# Patient Record
Sex: MALE | HISPANIC OR LATINO | Employment: FULL TIME | URBAN - METROPOLITAN AREA
[De-identification: names, ages, dates, MRNs, and addresses within clinical notes are randomized per-mention and may not be internally consistent; named-entity substitution may affect disease eponyms.]

---

## 2018-09-29 ENCOUNTER — APPOINTMENT (OUTPATIENT)
Dept: RADIOLOGY | Facility: MEDICAL CENTER | Age: 63
DRG: 854 | End: 2018-09-29
Attending: EMERGENCY MEDICINE
Payer: MEDICAID

## 2018-09-29 ENCOUNTER — HOSPITAL ENCOUNTER (INPATIENT)
Facility: MEDICAL CENTER | Age: 63
LOS: 17 days | DRG: 854 | End: 2018-10-16
Attending: EMERGENCY MEDICINE | Admitting: HOSPITALIST
Payer: MEDICAID

## 2018-09-29 DIAGNOSIS — L08.9 DIABETIC FOOT INFECTION (HCC): ICD-10-CM

## 2018-09-29 DIAGNOSIS — S98.132A AMPUTATED TOE OF LEFT FOOT (HCC): ICD-10-CM

## 2018-09-29 DIAGNOSIS — L03.90 CELLULITIS, UNSPECIFIED CELLULITIS SITE: ICD-10-CM

## 2018-09-29 DIAGNOSIS — E11.621 DIABETIC ULCER OF TOE OF LEFT FOOT ASSOCIATED WITH TYPE 2 DIABETES MELLITUS, WITH OTHER ULCER SEVERITY (HCC): ICD-10-CM

## 2018-09-29 DIAGNOSIS — E11.628 DIABETIC FOOT INFECTION (HCC): ICD-10-CM

## 2018-09-29 DIAGNOSIS — L97.528 DIABETIC ULCER OF TOE OF LEFT FOOT ASSOCIATED WITH TYPE 2 DIABETES MELLITUS, WITH OTHER ULCER SEVERITY (HCC): ICD-10-CM

## 2018-09-29 PROBLEM — E78.5 DYSLIPIDEMIA: Status: ACTIVE | Noted: 2018-09-29

## 2018-09-29 PROBLEM — I10 ESSENTIAL HYPERTENSION: Status: ACTIVE | Noted: 2018-09-29

## 2018-09-29 PROBLEM — A41.9 SEVERE SEPSIS (HCC): Status: RESOLVED | Noted: 2018-09-29 | Resolved: 2018-09-29

## 2018-09-29 PROBLEM — N17.9 AKI (ACUTE KIDNEY INJURY) (HCC): Status: ACTIVE | Noted: 2018-09-29

## 2018-09-29 PROBLEM — R65.20 SEVERE SEPSIS (HCC): Status: ACTIVE | Noted: 2018-09-29

## 2018-09-29 PROBLEM — R65.20 SEVERE SEPSIS (HCC): Status: RESOLVED | Noted: 2018-09-29 | Resolved: 2018-09-29

## 2018-09-29 PROBLEM — E11.8 TYPE 2 DIABETES MELLITUS WITH COMPLICATION, WITHOUT LONG-TERM CURRENT USE OF INSULIN (HCC): Status: ACTIVE | Noted: 2018-09-29

## 2018-09-29 PROBLEM — A41.9 SEVERE SEPSIS (HCC): Status: ACTIVE | Noted: 2018-09-29

## 2018-09-29 PROBLEM — A41.9 SEPSIS (HCC): Status: ACTIVE | Noted: 2018-09-29

## 2018-09-29 LAB
ALBUMIN SERPL BCP-MCNC: 3.6 G/DL (ref 3.2–4.9)
ALBUMIN/GLOB SERPL: 0.9 G/DL
ALP SERPL-CCNC: 74 U/L (ref 30–99)
ALT SERPL-CCNC: 10 U/L (ref 2–50)
ANION GAP SERPL CALC-SCNC: 12 MMOL/L (ref 0–11.9)
AST SERPL-CCNC: 11 U/L (ref 12–45)
BASOPHILS # BLD AUTO: 0.3 % (ref 0–1.8)
BASOPHILS # BLD: 0.07 K/UL (ref 0–0.12)
BILIRUB SERPL-MCNC: 1 MG/DL (ref 0.1–1.5)
BUN SERPL-MCNC: 42 MG/DL (ref 8–22)
CALCIUM SERPL-MCNC: 9.2 MG/DL (ref 8.5–10.5)
CHLORIDE SERPL-SCNC: 97 MMOL/L (ref 96–112)
CO2 SERPL-SCNC: 22 MMOL/L (ref 20–33)
CREAT SERPL-MCNC: 1.75 MG/DL (ref 0.5–1.4)
CRP SERPL HS-MCNC: 9.06 MG/DL (ref 0–0.75)
EOSINOPHIL # BLD AUTO: 0.11 K/UL (ref 0–0.51)
EOSINOPHIL NFR BLD: 0.5 % (ref 0–6.9)
ERYTHROCYTE [DISTWIDTH] IN BLOOD BY AUTOMATED COUNT: 39.8 FL (ref 35.9–50)
ERYTHROCYTE [SEDIMENTATION RATE] IN BLOOD BY WESTERGREN METHOD: 86 MM/HOUR (ref 0–20)
GLOBULIN SER CALC-MCNC: 4.2 G/DL (ref 1.9–3.5)
GLUCOSE BLD-MCNC: 229 MG/DL (ref 65–99)
GLUCOSE BLD-MCNC: 238 MG/DL (ref 65–99)
GLUCOSE SERPL-MCNC: 282 MG/DL (ref 65–99)
HCT VFR BLD AUTO: 43.1 % (ref 42–52)
HGB BLD-MCNC: 15.4 G/DL (ref 14–18)
IMM GRANULOCYTES # BLD AUTO: 0.13 K/UL (ref 0–0.11)
IMM GRANULOCYTES NFR BLD AUTO: 0.6 % (ref 0–0.9)
LACTATE BLD-SCNC: 1.6 MMOL/L (ref 0.5–2)
LYMPHOCYTES # BLD AUTO: 1.47 K/UL (ref 1–4.8)
LYMPHOCYTES NFR BLD: 6.8 % (ref 22–41)
MCH RBC QN AUTO: 31.3 PG (ref 27–33)
MCHC RBC AUTO-ENTMCNC: 35.7 G/DL (ref 33.7–35.3)
MCV RBC AUTO: 87.6 FL (ref 81.4–97.8)
MONOCYTES # BLD AUTO: 1.66 K/UL (ref 0–0.85)
MONOCYTES NFR BLD AUTO: 7.7 % (ref 0–13.4)
NEUTROPHILS # BLD AUTO: 18.25 K/UL (ref 1.82–7.42)
NEUTROPHILS NFR BLD: 84.1 % (ref 44–72)
NRBC # BLD AUTO: 0 K/UL
NRBC BLD-RTO: 0 /100 WBC
PLATELET # BLD AUTO: 248 K/UL (ref 164–446)
PMV BLD AUTO: 10.5 FL (ref 9–12.9)
POTASSIUM SERPL-SCNC: 3.7 MMOL/L (ref 3.6–5.5)
PROT SERPL-MCNC: 7.8 G/DL (ref 6–8.2)
RBC # BLD AUTO: 4.92 M/UL (ref 4.7–6.1)
SODIUM SERPL-SCNC: 131 MMOL/L (ref 135–145)
WBC # BLD AUTO: 21.7 K/UL (ref 4.8–10.8)

## 2018-09-29 PROCEDURE — 86140 C-REACTIVE PROTEIN: CPT

## 2018-09-29 PROCEDURE — 96367 TX/PROPH/DG ADDL SEQ IV INF: CPT

## 2018-09-29 PROCEDURE — 160035 HCHG PACU - 1ST 60 MINS PHASE I: Performed by: ORTHOPAEDIC SURGERY

## 2018-09-29 PROCEDURE — 501838 HCHG SUTURE GENERAL: Performed by: ORTHOPAEDIC SURGERY

## 2018-09-29 PROCEDURE — A6222 GAUZE <=16 IN NO W/SAL W/O B: HCPCS | Performed by: ORTHOPAEDIC SURGERY

## 2018-09-29 PROCEDURE — 700111 HCHG RX REV CODE 636 W/ 250 OVERRIDE (IP): Performed by: HOSPITALIST

## 2018-09-29 PROCEDURE — 88311 DECALCIFY TISSUE: CPT

## 2018-09-29 PROCEDURE — 87015 SPECIMEN INFECT AGNT CONCNTJ: CPT

## 2018-09-29 PROCEDURE — 500891 HCHG PACK, ORTHO MAJOR: Performed by: ORTHOPAEDIC SURGERY

## 2018-09-29 PROCEDURE — 87040 BLOOD CULTURE FOR BACTERIA: CPT

## 2018-09-29 PROCEDURE — 88304 TISSUE EXAM BY PATHOLOGIST: CPT

## 2018-09-29 PROCEDURE — 80053 COMPREHEN METABOLIC PANEL: CPT

## 2018-09-29 PROCEDURE — 96365 THER/PROPH/DIAG IV INF INIT: CPT

## 2018-09-29 PROCEDURE — 83605 ASSAY OF LACTIC ACID: CPT

## 2018-09-29 PROCEDURE — 160002 HCHG RECOVERY MINUTES (STAT): Performed by: ORTHOPAEDIC SURGERY

## 2018-09-29 PROCEDURE — 36415 COLL VENOUS BLD VENIPUNCTURE: CPT

## 2018-09-29 PROCEDURE — 700102 HCHG RX REV CODE 250 W/ 637 OVERRIDE(OP): Performed by: HOSPITALIST

## 2018-09-29 PROCEDURE — 85025 COMPLETE CBC W/AUTO DIFF WBC: CPT

## 2018-09-29 PROCEDURE — 87205 SMEAR GRAM STAIN: CPT

## 2018-09-29 PROCEDURE — 85652 RBC SED RATE AUTOMATED: CPT

## 2018-09-29 PROCEDURE — 73630 X-RAY EXAM OF FOOT: CPT | Mod: LT

## 2018-09-29 PROCEDURE — 87186 SC STD MICRODIL/AGAR DIL: CPT

## 2018-09-29 PROCEDURE — 87077 CULTURE AEROBIC IDENTIFY: CPT | Mod: 91

## 2018-09-29 PROCEDURE — 87206 SMEAR FLUORESCENT/ACID STAI: CPT

## 2018-09-29 PROCEDURE — 700105 HCHG RX REV CODE 258: Performed by: PHYSICIAN ASSISTANT

## 2018-09-29 PROCEDURE — 88305 TISSUE EXAM BY PATHOLOGIST: CPT

## 2018-09-29 PROCEDURE — 87076 CULTURE ANAEROBE IDENT EACH: CPT

## 2018-09-29 PROCEDURE — 87075 CULTR BACTERIA EXCEPT BLOOD: CPT

## 2018-09-29 PROCEDURE — 700105 HCHG RX REV CODE 258: Performed by: EMERGENCY MEDICINE

## 2018-09-29 PROCEDURE — 87102 FUNGUS ISOLATION CULTURE: CPT

## 2018-09-29 PROCEDURE — 0KBW0ZZ EXCISION OF LEFT FOOT MUSCLE, OPEN APPROACH: ICD-10-PCS | Performed by: ORTHOPAEDIC SURGERY

## 2018-09-29 PROCEDURE — 700111 HCHG RX REV CODE 636 W/ 250 OVERRIDE (IP): Performed by: EMERGENCY MEDICINE

## 2018-09-29 PROCEDURE — 82962 GLUCOSE BLOOD TEST: CPT

## 2018-09-29 PROCEDURE — 160027 HCHG SURGERY MINUTES - 1ST 30 MINS LEVEL 2: Performed by: ORTHOPAEDIC SURGERY

## 2018-09-29 PROCEDURE — 770006 HCHG ROOM/CARE - MED/SURG/GYN SEMI*

## 2018-09-29 PROCEDURE — 0Y6W0Z0 DETACHMENT AT LEFT 4TH TOE, COMPLETE, OPEN APPROACH: ICD-10-PCS | Performed by: ORTHOPAEDIC SURGERY

## 2018-09-29 PROCEDURE — A9270 NON-COVERED ITEM OR SERVICE: HCPCS | Performed by: HOSPITALIST

## 2018-09-29 PROCEDURE — 99285 EMERGENCY DEPT VISIT HI MDM: CPT

## 2018-09-29 PROCEDURE — 160009 HCHG ANES TIME/MIN: Performed by: ORTHOPAEDIC SURGERY

## 2018-09-29 PROCEDURE — 87070 CULTURE OTHR SPECIMN AEROBIC: CPT

## 2018-09-29 PROCEDURE — 700111 HCHG RX REV CODE 636 W/ 250 OVERRIDE (IP)

## 2018-09-29 PROCEDURE — 71045 X-RAY EXAM CHEST 1 VIEW: CPT

## 2018-09-29 PROCEDURE — 700101 HCHG RX REV CODE 250: Performed by: HOSPITALIST

## 2018-09-29 PROCEDURE — 160048 HCHG OR STATISTICAL LEVEL 1-5: Performed by: ORTHOPAEDIC SURGERY

## 2018-09-29 PROCEDURE — 87116 MYCOBACTERIA CULTURE: CPT

## 2018-09-29 PROCEDURE — 700111 HCHG RX REV CODE 636 W/ 250 OVERRIDE (IP): Performed by: PHYSICIAN ASSISTANT

## 2018-09-29 PROCEDURE — 99223 1ST HOSP IP/OBS HIGH 75: CPT | Performed by: HOSPITALIST

## 2018-09-29 RX ORDER — ACETAMINOPHEN 325 MG/1
650 TABLET ORAL EVERY 6 HOURS PRN
Status: DISCONTINUED | OUTPATIENT
Start: 2018-09-29 | End: 2018-10-16 | Stop reason: HOSPADM

## 2018-09-29 RX ORDER — MORPHINE SULFATE 4 MG/ML
1-4 INJECTION, SOLUTION INTRAMUSCULAR; INTRAVENOUS
Status: DISCONTINUED | OUTPATIENT
Start: 2018-09-29 | End: 2018-10-02

## 2018-09-29 RX ORDER — OXYCODONE HCL 5 MG/5 ML
5 SOLUTION, ORAL ORAL
Status: DISCONTINUED | OUTPATIENT
Start: 2018-09-29 | End: 2018-09-29 | Stop reason: HOSPADM

## 2018-09-29 RX ORDER — OXYCODONE HYDROCHLORIDE 5 MG/1
5-10 TABLET ORAL EVERY 4 HOURS PRN
Status: DISCONTINUED | OUTPATIENT
Start: 2018-09-29 | End: 2018-10-10

## 2018-09-29 RX ORDER — LOVASTATIN 40 MG/1
40 TABLET ORAL EVERY MORNING
COMMUNITY

## 2018-09-29 RX ORDER — LISINOPRIL AND HYDROCHLOROTHIAZIDE 20; 12.5 MG/1; MG/1
1 TABLET ORAL EVERY MORNING
COMMUNITY

## 2018-09-29 RX ORDER — PROMETHAZINE HYDROCHLORIDE 25 MG/1
12.5-25 SUPPOSITORY RECTAL EVERY 4 HOURS PRN
Status: DISCONTINUED | OUTPATIENT
Start: 2018-09-29 | End: 2018-10-16

## 2018-09-29 RX ORDER — DEXTROSE MONOHYDRATE 25 G/50ML
25 INJECTION, SOLUTION INTRAVENOUS
Status: DISCONTINUED | OUTPATIENT
Start: 2018-09-29 | End: 2018-10-01

## 2018-09-29 RX ORDER — PROMETHAZINE HYDROCHLORIDE 25 MG/1
12.5-25 TABLET ORAL EVERY 4 HOURS PRN
Status: DISCONTINUED | OUTPATIENT
Start: 2018-09-29 | End: 2018-10-16

## 2018-09-29 RX ORDER — ONDANSETRON 2 MG/ML
4 INJECTION INTRAMUSCULAR; INTRAVENOUS EVERY 4 HOURS PRN
Status: DISCONTINUED | OUTPATIENT
Start: 2018-09-29 | End: 2018-10-10

## 2018-09-29 RX ORDER — HALOPERIDOL 5 MG/ML
1 INJECTION INTRAMUSCULAR
Status: DISCONTINUED | OUTPATIENT
Start: 2018-09-29 | End: 2018-09-29 | Stop reason: HOSPADM

## 2018-09-29 RX ORDER — OXYCODONE HCL 5 MG/5 ML
10 SOLUTION, ORAL ORAL
Status: DISCONTINUED | OUTPATIENT
Start: 2018-09-29 | End: 2018-09-29 | Stop reason: HOSPADM

## 2018-09-29 RX ORDER — BISACODYL 10 MG
10 SUPPOSITORY, RECTAL RECTAL
Status: DISCONTINUED | OUTPATIENT
Start: 2018-09-29 | End: 2018-10-16

## 2018-09-29 RX ORDER — ONDANSETRON 4 MG/1
4 TABLET, ORALLY DISINTEGRATING ORAL EVERY 4 HOURS PRN
Status: DISCONTINUED | OUTPATIENT
Start: 2018-09-29 | End: 2018-10-16 | Stop reason: HOSPADM

## 2018-09-29 RX ORDER — MEPERIDINE HYDROCHLORIDE 25 MG/ML
12.5 INJECTION INTRAMUSCULAR; INTRAVENOUS; SUBCUTANEOUS
Status: DISCONTINUED | OUTPATIENT
Start: 2018-09-29 | End: 2018-09-29 | Stop reason: HOSPADM

## 2018-09-29 RX ORDER — POLYETHYLENE GLYCOL 3350 17 G/17G
1 POWDER, FOR SOLUTION ORAL
Status: DISCONTINUED | OUTPATIENT
Start: 2018-09-29 | End: 2018-10-16

## 2018-09-29 RX ORDER — DIPHENHYDRAMINE HYDROCHLORIDE 50 MG/ML
12.5 INJECTION INTRAMUSCULAR; INTRAVENOUS
Status: DISCONTINUED | OUTPATIENT
Start: 2018-09-29 | End: 2018-09-29 | Stop reason: HOSPADM

## 2018-09-29 RX ORDER — SODIUM CHLORIDE 9 MG/ML
1000 INJECTION, SOLUTION INTRAVENOUS
Status: COMPLETED | OUTPATIENT
Start: 2018-09-29 | End: 2018-09-29

## 2018-09-29 RX ORDER — SODIUM CHLORIDE, SODIUM LACTATE, POTASSIUM CHLORIDE, CALCIUM CHLORIDE 600; 310; 30; 20 MG/100ML; MG/100ML; MG/100ML; MG/100ML
INJECTION, SOLUTION INTRAVENOUS CONTINUOUS
Status: DISCONTINUED | OUTPATIENT
Start: 2018-09-29 | End: 2018-09-29 | Stop reason: HOSPADM

## 2018-09-29 RX ORDER — SODIUM CHLORIDE AND POTASSIUM CHLORIDE 150; 900 MG/100ML; MG/100ML
INJECTION, SOLUTION INTRAVENOUS CONTINUOUS
Status: DISCONTINUED | OUTPATIENT
Start: 2018-09-29 | End: 2018-10-01

## 2018-09-29 RX ORDER — HEPARIN SODIUM 5000 [USP'U]/ML
5000 INJECTION, SOLUTION INTRAVENOUS; SUBCUTANEOUS EVERY 8 HOURS
Status: DISCONTINUED | OUTPATIENT
Start: 2018-09-29 | End: 2018-10-16 | Stop reason: HOSPADM

## 2018-09-29 RX ORDER — AMOXICILLIN 250 MG
2 CAPSULE ORAL 2 TIMES DAILY
Status: DISCONTINUED | OUTPATIENT
Start: 2018-09-29 | End: 2018-10-16

## 2018-09-29 RX ORDER — ONDANSETRON 2 MG/ML
4 INJECTION INTRAMUSCULAR; INTRAVENOUS
Status: DISCONTINUED | OUTPATIENT
Start: 2018-09-29 | End: 2018-09-29 | Stop reason: HOSPADM

## 2018-09-29 RX ORDER — GLIMEPIRIDE 4 MG/1
4 TABLET ORAL EVERY MORNING
COMMUNITY

## 2018-09-29 RX ADMIN — VANCOMYCIN HYDROCHLORIDE 2000 MG: 100 INJECTION, POWDER, LYOPHILIZED, FOR SOLUTION INTRAVENOUS at 13:34

## 2018-09-29 RX ADMIN — SODIUM CHLORIDE 1000 ML: 9 INJECTION, SOLUTION INTRAVENOUS at 12:49

## 2018-09-29 RX ADMIN — POTASSIUM CHLORIDE AND SODIUM CHLORIDE: 900; 150 INJECTION, SOLUTION INTRAVENOUS at 18:58

## 2018-09-29 RX ADMIN — AMPICILLIN AND SULBACTAM 3 G: 2; 1 INJECTION, POWDER, FOR SOLUTION INTRAVENOUS at 23:19

## 2018-09-29 RX ADMIN — HEPARIN SODIUM 5000 UNITS: 5000 INJECTION, SOLUTION INTRAVENOUS; SUBCUTANEOUS at 23:19

## 2018-09-29 RX ADMIN — AMPICILLIN AND SULBACTAM 3 G: 2; 1 INJECTION, POWDER, FOR SOLUTION INTRAVENOUS at 18:55

## 2018-09-29 RX ADMIN — INSULIN HUMAN 4 UNITS: 100 INJECTION, SOLUTION PARENTERAL at 23:14

## 2018-09-29 RX ADMIN — AMPICILLIN AND SULBACTAM 3 G: 2; 1 INJECTION, POWDER, FOR SOLUTION INTRAVENOUS at 12:49

## 2018-09-29 ASSESSMENT — COPD QUESTIONNAIRES
DO YOU EVER COUGH UP ANY MUCUS OR PHLEGM?: NO/ONLY WITH OCCASIONAL COLDS OR INFECTIONS
COPD SCREENING SCORE: 2
IN THE PAST 12 MONTHS DO YOU DO LESS THAN YOU USED TO BECAUSE OF YOUR BREATHING PROBLEMS: DISAGREE/UNSURE
HAVE YOU SMOKED AT LEAST 100 CIGARETTES IN YOUR ENTIRE LIFE: NO/DON'T KNOW
DURING THE PAST 4 WEEKS HOW MUCH DID YOU FEEL SHORT OF BREATH: NONE/LITTLE OF THE TIME

## 2018-09-29 ASSESSMENT — PATIENT HEALTH QUESTIONNAIRE - PHQ9
2. FEELING DOWN, DEPRESSED, IRRITABLE, OR HOPELESS: NOT AT ALL
1. LITTLE INTEREST OR PLEASURE IN DOING THINGS: NOT AT ALL
SUM OF ALL RESPONSES TO PHQ9 QUESTIONS 1 AND 2: 0
1. LITTLE INTEREST OR PLEASURE IN DOING THINGS: NOT AT ALL
2. FEELING DOWN, DEPRESSED, IRRITABLE, OR HOPELESS: NOT AT ALL
SUM OF ALL RESPONSES TO PHQ9 QUESTIONS 1 AND 2: 0

## 2018-09-29 ASSESSMENT — LIFESTYLE VARIABLES
AVERAGE NUMBER OF DAYS PER WEEK YOU HAVE A DRINK CONTAINING ALCOHOL: 1
TOTAL SCORE: 0
ON A TYPICAL DAY WHEN YOU DRINK ALCOHOL HOW MANY DRINKS DO YOU HAVE: 2
HOW MANY TIMES IN THE PAST YEAR HAVE YOU HAD 5 OR MORE DRINKS IN A DAY: 0
EVER HAD A DRINK FIRST THING IN THE MORNING TO STEADY YOUR NERVES TO GET RID OF A HANGOVER: NO
TOTAL SCORE: 0
EVER FELT BAD OR GUILTY ABOUT YOUR DRINKING: NO
CONSUMPTION TOTAL: NEGATIVE
ALCOHOL_USE: YES
HAVE PEOPLE ANNOYED YOU BY CRITICIZING YOUR DRINKING: NO
HAVE YOU EVER FELT YOU SHOULD CUT DOWN ON YOUR DRINKING: NO
TOTAL SCORE: 0
EVER_SMOKED: NEVER

## 2018-09-29 ASSESSMENT — COGNITIVE AND FUNCTIONAL STATUS - GENERAL
MOBILITY SCORE: 24
SUGGESTED CMS G CODE MODIFIER DAILY ACTIVITY: CH
SUGGESTED CMS G CODE MODIFIER MOBILITY: CH
DAILY ACTIVITIY SCORE: 24

## 2018-09-29 ASSESSMENT — ENCOUNTER SYMPTOMS
SHORTNESS OF BREATH: 0
CHILLS: 0
FEVER: 0

## 2018-09-29 ASSESSMENT — PAIN SCALES - GENERAL
PAINLEVEL_OUTOF10: 0
PAINLEVEL_OUTOF10: 0
PAINLEVEL_OUTOF10: 3
PAINLEVEL_OUTOF10: 0
PAINLEVEL_OUTOF10: 0
PAINLEVEL_OUTOF10: 2
PAINLEVEL_OUTOF10: 0

## 2018-09-29 NOTE — CONSULTS
"9/29/2018    Sumit Estrada is a 62 y.o. male who presents after with a necrotic foot infection and is here for operative management. Patient denies numbness, parasthesias, loss of concousness or other trauma    Past Medical History:   Diagnosis Date   • Diabetes mellitus (HCC)    • HTN (hypertension)        History reviewed. No pertinent surgical history.    Medications  No current facility-administered medications on file prior to encounter.      No current outpatient prescriptions on file prior to encounter.       Allergies  Patient has no known allergies.    ROS  Left 4th toe nocrosis. All other systems were reviewed and found to be negative    History reviewed. No pertinent family history.    Social History     Social History   • Marital status: Single     Spouse name: N/A   • Number of children: N/A   • Years of education: N/A     Social History Main Topics   • Smoking status: Never Smoker   • Smokeless tobacco: Never Used   • Alcohol use Yes      Comment: occ   • Drug use: No   • Sexual activity: Not on file     Other Topics Concern   • Not on file     Social History Narrative   • No narrative on file       Physical Exam  Vitals  Blood pressure (!) 163/79, pulse (!) 103, temperature (!) 38.2 °C (100.8 °F), resp. rate 18, height 1.74 m (5' 8.5\"), weight 79.4 kg (175 lb), SpO2 96 %.  General: Well Developed, Well Nourished, no acute distress  HEENT: Normocephalic, atraumatic  Eyes: Anicteric, PERRLA, EOMI  Neck: Supple, nontender, no masses  Lungs: CTA, no wheezes or crackles  Heart: RRR, no murmurs, rubs or gallops  Abdomen: Soft, NT, ND  Pelvis: Stable to AP and Lateral Compression  Skin: Intact, no open wounds  Extremities: necrotic left 4th toe  Neuro: NVI  Vascular: 2+DP/PT, Capillary refill <2 seconds    Radiographs:  DX-FOOT-COMPLETE 3+ LEFT   Final Result      1.  No radiographic evidence for osteomyelitis      2.  Gas associated with the lateral forefoot      DX-CHEST-PORTABLE (1 VIEW)   Final " Result      Negative single view of the chest.          Laboratory Values  Recent Labs      09/29/18   1228   WBC  21.7*   RBC  4.92   HEMOGLOBIN  15.4   HEMATOCRIT  43.1   MCV  87.6   MCH  31.3   MCHC  35.7*   RDW  39.8   PLATELETCT  248   MPV  10.5     Recent Labs      09/29/18   1228   SODIUM  131*   POTASSIUM  3.7   CHLORIDE  97   CO2  22   GLUCOSE  282*   BUN  42*             Impression:Necrotic leftt 4th toe and foot absces    Plan:Operative intervention. Risks and benefits of surgery were discussed which include but are not limited to bleeding, infection, neurovascular damage, malunion, nonunion, instability, limb length discrepancy, DVT, PE, MI, Stroke and death. They understand these risks and wish to proceed.

## 2018-09-29 NOTE — ED NOTES
Med rec complete per pt and SO at bedside   Allergies reviewed - NKDA  No ABX in last month  Pt reports he has been out of lisinopril/hctz and glimepiride for around 2 weeks, but has still been taking his metformin and lovastatin

## 2018-09-29 NOTE — PROGRESS NOTES
Report received fromED RN. Pt arrived on unit by transport on RA.     Assessed patient left fourth toe. There are no signs of cap refill on toe. Toe has eschar on anterior side of toe. Will complete skin assessment.

## 2018-09-29 NOTE — ED TRIAGE NOTES
61 y/o male bib wheelchair for evaluation of a wound on the left foot. Pt was sent in by his pcp who believes the wound on the foot may be infected. Pt states that over one week ago he dropped a piece of furniture on the foot causing the wound.

## 2018-09-29 NOTE — ED PROVIDER NOTES
ED Provider Note    Scribed for Lavinia Cobb M.D. by Quinton Jessica. 9/29/2018, 12:18 PM.    Primary care provider: Pcp Pt States None  Means of arrival: Wheel chair  History obtained from: Patient  History limited by: None    CHIEF COMPLAINT  Chief Complaint   Patient presents with   • Wound Check       HPI  Sumit Estrada is a 62 y.o. male who presents to the Emergency Department for a wound check on the fourth toe.  This occurred after he dropped an object on the toe a week ago.  The patient [facility] today by Dr. Pierre.  The patient is positive for diabetes type 2, is not on insulin, and has diabetes this for 7 years. The patient denies any fever but had chills last night.    REVIEW OF SYSTEMS  Pertinent positives include left fourth toe pain, chills. Pertinent negatives include no fevers. All other systems reviewed and negative.     PAST MEDICAL HISTORY   has a past medical history of Diabetes mellitus (HCC) and HTN (hypertension).    SURGICAL HISTORY  patient denies any surgical history    SOCIAL HISTORY  Social History   Substance Use Topics   • Smoking status: Never Smoker   • Smokeless tobacco: Never Used   • Alcohol use Yes      Comment: occ      History   Drug Use No       FAMILY HISTORY  History reviewed. No pertinent family history.    CURRENT MEDICATIONS    Current Facility-Administered Medications:   •  ampicillin/sulbactam (UNASYN) 3 g in  mL IVPB, 3 g, Intravenous, Once, Lavinia Cobb M.D., Last Rate: 200 mL/hr at 09/29/18 1249, 3 g at 09/29/18 1249  •  vancomycin 2,000 mg in  mL IVPB, 25 mg/kg, Intravenous, Once, Lavinia Cobb M.D.    Current Outpatient Prescriptions:   •  metFORMIN (GLUCOPHAGE) 850 MG Tab, Take 850 mg by mouth 3 times a day., Disp: , Rfl:   •  lisinopril-hydrochlorothiazide (PRINZIDE, ZESTORETIC) 20-12.5 MG per tablet, Take 1 Tab by mouth every morning., Disp: , Rfl:   •  glimepiride (AMARYL) 4 MG Tab, Take 4  mg by mouth every morning., Disp: , Rfl:   •  lovastatin (MEVACOR) 40 MG tablet, Take 40 mg by mouth every morning., Disp: , Rfl:     ALLERGIES  No Known Allergies    PHYSICAL EXAM  VITAL SIGNS: /68   Pulse 91   Temp 36 °C (96.8 °F)   Resp 14   Wt 79.4 kg (175 lb)   SpO2 98%   BMI 26.22 kg/m²     Constitutional:  Laying in bed with his feet fully visible , able to answer questions  HENT: Nose is normal in appearance without rhinorrhea, external ears are normal,  moist mucous membranes  Eyes: Anicteric,  pupils are equal round and reactive, there is no conjunctival drainage or pallor   Neck: The trachea is midline, there is no obvious mass or meningeal signs  Cardiovascular: Equal radial pulsation, regular rate and rhythm without murmurs gallops or rubs  Thorax & Lungs: Respiratory rate and effort are normal. There is normal chest excursion with respiration.  No wheezes rhonchi or rales noted.  Abdomen: Abdomen is normal in appearance,  no pain with cough, nonpulsatile  :  No CVA tenderness to palpation  Musculoskeletal: Necrosis of the left fourth with ulceration and black circumferentially  on the surface and erythema and swelling of the entire left foot up to the ankle.  Skin: Visualized skin is warm, no erythema, no rash.  Neurologic:  Cranial nerves II through XII are intact there is no focal abnormality noted.  Psychiatric: Normal mood and mentation    DIAGNOSTIC STUDIES / PROCEDURES    LABS  DX-CHEST-PORTABLE (1 VIEW)    (Results Pending)     Results for orders placed or performed during the hospital encounter of 09/29/18   LACTIC ACID   Result Value Ref Range    Lactic Acid 1.6 0.5 - 2.0 mmol/L   CBC WITH DIFFERENTIAL   Result Value Ref Range    WBC 21.7 (H) 4.8 - 10.8 K/uL    RBC 4.92 4.70 - 6.10 M/uL    Hemoglobin 15.4 14.0 - 18.0 g/dL    Hematocrit 43.1 42.0 - 52.0 %    MCV 87.6 81.4 - 97.8 fL    MCH 31.3 27.0 - 33.0 pg    MCHC 35.7 (H) 33.7 - 35.3 g/dL    RDW 39.8 35.9 - 50.0 fL    Platelet  Count 248 164 - 446 K/uL    MPV 10.5 9.0 - 12.9 fL    Neutrophils-Polys 84.10 (H) 44.00 - 72.00 %    Lymphocytes 6.80 (L) 22.00 - 41.00 %    Monocytes 7.70 0.00 - 13.40 %    Eosinophils 0.50 0.00 - 6.90 %    Basophils 0.30 0.00 - 1.80 %    Immature Granulocytes 0.60 0.00 - 0.90 %    Nucleated RBC 0.00 /100 WBC    Neutrophils (Absolute) 18.25 (H) 1.82 - 7.42 K/uL    Lymphs (Absolute) 1.47 1.00 - 4.80 K/uL    Monos (Absolute) 1.66 (H) 0.00 - 0.85 K/uL    Eos (Absolute) 0.11 0.00 - 0.51 K/uL    Baso (Absolute) 0.07 0.00 - 0.12 K/uL    Immature Granulocytes (abs) 0.13 (H) 0.00 - 0.11 K/uL    NRBC (Absolute) 0.00 K/uL       All labs reviewed by me.    EKG    RADIOLOGY  DX-CHEST-PORTABLE (1 VIEW)    (Results Pending)     The radiologist's interpretation of all radiological studies have been reviewed by me.    COURSE & MEDICAL DECISION MAKING  Nursing notes and vital signs were reviewed. (See chart for details)  The patient's records were reviewed, history was obtained from the patient and from Dr. Pierre's student outreach clinic note that was carried with the patient;     The patient presents with left fourth toe infection, and the differential diagnosis includes but is not limited to cellulitis, diabetic foot ulceration, necrotic toe.       12:18 PM Initial orders in the Emergency Department included Chest Xray, Lactic acid, Westergren SED rate, CRP, CBC, CMP, UA, Urine culture, foot culture. Blood culture x2 and initial treatment in the Emergency Department included ampicillin/sulbactam 3g, Vancomycin and the patient received IV  Fluids for IV antibiotic use.      ED testing reveals elevated WBC, imaging pending      Additional work-up planned includes admission for IV antibitoics  For cellultiisand to rule out osteomyelitis, toe necrosis..  This was discussed with Dr. Mercado     FINAL IMPRESSION  1. Diabetic ulcer of toe of left foot associated with type 2 diabetes mellitus, with other ulcer severity (HCC)    2.  Cellulitis, unspecified cellulitis site          I, Quinton Jessica (Scribe), am scribing for, and in the presence of, Lavinia Cobb M.D..    Electronically signed by: Quinton Jessica (Scribe), 9/29/2018    ILavinia M.D. personally performed the services described in this documentation, as scribed by Quinton Jessica in my presence, and it is both accurate and complete. C.     The note accurately reflects work and decisions made by me.  Lavinia Cobb  9/29/2018  1:08 PM

## 2018-09-29 NOTE — PROGRESS NOTES
Pharmacy Kinetics 62 y.o. male on vancomycin day # 1  2018    Received Vancomycin 2,000 mg IV loading dose at 13:34 PM    Indication for Treatment: cellulitis of left toe / diabetic foot.    Pertinent history per medical record: Admitted on 2018 for sepsis / cellulitis of left toe / diabetic foot.    Sumit Estrada is a 62 y.o. diabetic male who presented to the ER for a wound check of his left fourth toe. The patient reportedly dropped an object on his toe a week ago and reports having chills last night. He presents with a leukocytosis, and an elevated c-reactive protein. Surgery has been consulted.     Other antibiotics: ampicllin-sulbactam 3g IV Q6h     Allergies: Patient has no known allergies.     List concerns for renal function: age, BUN/SCr ratio > 20:1    Pertinent cultures to date:   2018: Blood culture - in process  2018: Blood culture - in process  2018: Wound culture w/ gram stain - in process      Recent Labs      18   1228   WBC  21.7*   NEUTSPOLYS  84.10*     Recent Labs      18   1228   BUN  42*   CREATININE  1.75*   ALBUMIN  3.6      2018 12:28   Stat C-Reactive Protein 9.06 (H)     Blood pressure 118/68, pulse 94, temperature 36 °C (96.8 °F), resp. rate 14, weight 79.4 kg (175 lb), SpO2 94 %. Temp (24hrs), Av °C (96.8 °F), Min:36 °C (96.8 °F), Max:36 °C (96.8 °F)    A/P   1. Vancomycin dose change: No further doses ordered at this time due to aforementioned concerns for renal accumulation of vancomycin.   2. Next vancomycin level: Vancomycin random level ordered for 07:30 AM tomorrow (~18 hours post LD)  3. Goal trough: 12-16 mcg/mL  4. Comments: Concerns for accumulation of vancomycin listed above. No previous renal fcn labs to compare current labs to. Will obtain vancomycin random level ~18h post loading dose administration. Pharmacy will continue to follow and recommend de-escalation of antibiotics as appropriate.     Erin Diaz,  PharmD, BCPS

## 2018-09-30 PROBLEM — I10 ESSENTIAL HYPERTENSION: Chronic | Status: ACTIVE | Noted: 2018-09-29

## 2018-09-30 PROBLEM — E11.8 TYPE 2 DIABETES MELLITUS WITH COMPLICATION, WITHOUT LONG-TERM CURRENT USE OF INSULIN (HCC): Chronic | Status: ACTIVE | Noted: 2018-09-29

## 2018-09-30 PROBLEM — E78.5 DYSLIPIDEMIA: Chronic | Status: ACTIVE | Noted: 2018-09-29

## 2018-09-30 PROBLEM — S98.132A AMPUTATED TOE OF LEFT FOOT (HCC): Status: ACTIVE | Noted: 2018-09-30

## 2018-09-30 LAB
AMORPH CRY #/AREA URNS HPF: PRESENT /HPF
ANION GAP SERPL CALC-SCNC: 8 MMOL/L (ref 0–11.9)
APPEARANCE UR: CLEAR
BACTERIA #/AREA URNS HPF: NEGATIVE /HPF
BASOPHILS # BLD AUTO: 0.3 % (ref 0–1.8)
BASOPHILS # BLD AUTO: 0.4 % (ref 0–1.8)
BASOPHILS # BLD: 0.06 K/UL (ref 0–0.12)
BASOPHILS # BLD: 0.08 K/UL (ref 0–0.12)
BILIRUB UR QL STRIP.AUTO: NEGATIVE
BUN SERPL-MCNC: 28 MG/DL (ref 8–22)
CALCIUM SERPL-MCNC: 8.2 MG/DL (ref 8.5–10.5)
CHLORIDE SERPL-SCNC: 103 MMOL/L (ref 96–112)
CO2 SERPL-SCNC: 22 MMOL/L (ref 20–33)
COLOR UR: ABNORMAL
CREAT SERPL-MCNC: 1.02 MG/DL (ref 0.5–1.4)
EOSINOPHIL # BLD AUTO: 0.02 K/UL (ref 0–0.51)
EOSINOPHIL # BLD AUTO: 0.02 K/UL (ref 0–0.51)
EOSINOPHIL NFR BLD: 0.1 % (ref 0–6.9)
EOSINOPHIL NFR BLD: 0.1 % (ref 0–6.9)
EPI CELLS #/AREA URNS HPF: NEGATIVE /HPF
ERYTHROCYTE [DISTWIDTH] IN BLOOD BY AUTOMATED COUNT: 39.8 FL (ref 35.9–50)
ERYTHROCYTE [DISTWIDTH] IN BLOOD BY AUTOMATED COUNT: 40.4 FL (ref 35.9–50)
EST. AVERAGE GLUCOSE BLD GHB EST-MCNC: 192 MG/DL
GLUCOSE BLD-MCNC: 170 MG/DL (ref 65–99)
GLUCOSE BLD-MCNC: 170 MG/DL (ref 65–99)
GLUCOSE BLD-MCNC: 232 MG/DL (ref 65–99)
GLUCOSE BLD-MCNC: 82 MG/DL (ref 65–99)
GLUCOSE SERPL-MCNC: 186 MG/DL (ref 65–99)
GLUCOSE UR STRIP.AUTO-MCNC: >=1000 MG/DL
GRAM STN SPEC: NORMAL
GRAN CASTS #/AREA URNS LPF: ABNORMAL /LPF
HBA1C MFR BLD: 8.3 % (ref 0–5.6)
HCT VFR BLD AUTO: 36.3 % (ref 42–52)
HCT VFR BLD AUTO: 36.4 % (ref 42–52)
HGB BLD-MCNC: 12.8 G/DL (ref 14–18)
HGB BLD-MCNC: 12.9 G/DL (ref 14–18)
HYALINE CASTS #/AREA URNS LPF: ABNORMAL /LPF
IMM GRANULOCYTES # BLD AUTO: 0.1 K/UL (ref 0–0.11)
IMM GRANULOCYTES # BLD AUTO: 0.12 K/UL (ref 0–0.11)
IMM GRANULOCYTES NFR BLD AUTO: 0.5 % (ref 0–0.9)
IMM GRANULOCYTES NFR BLD AUTO: 0.7 % (ref 0–0.9)
KETONES UR STRIP.AUTO-MCNC: ABNORMAL MG/DL
LEUKOCYTE ESTERASE UR QL STRIP.AUTO: NEGATIVE
LYMPHOCYTES # BLD AUTO: 1.22 K/UL (ref 1–4.8)
LYMPHOCYTES # BLD AUTO: 1.24 K/UL (ref 1–4.8)
LYMPHOCYTES NFR BLD: 6.7 % (ref 22–41)
LYMPHOCYTES NFR BLD: 6.7 % (ref 22–41)
MAGNESIUM SERPL-MCNC: 1.8 MG/DL (ref 1.5–2.5)
MCH RBC QN AUTO: 31.1 PG (ref 27–33)
MCH RBC QN AUTO: 32 PG (ref 27–33)
MCHC RBC AUTO-ENTMCNC: 35.2 G/DL (ref 33.7–35.3)
MCHC RBC AUTO-ENTMCNC: 35.5 G/DL (ref 33.7–35.3)
MCV RBC AUTO: 88.3 FL (ref 81.4–97.8)
MCV RBC AUTO: 90.1 FL (ref 81.4–97.8)
MICRO URNS: ABNORMAL
MONOCYTES # BLD AUTO: 1.43 K/UL (ref 0–0.85)
MONOCYTES # BLD AUTO: 1.48 K/UL (ref 0–0.85)
MONOCYTES NFR BLD AUTO: 7.9 % (ref 0–13.4)
MONOCYTES NFR BLD AUTO: 8 % (ref 0–13.4)
NEUTROPHILS # BLD AUTO: 15.31 K/UL (ref 1.82–7.42)
NEUTROPHILS # BLD AUTO: 15.54 K/UL (ref 1.82–7.42)
NEUTROPHILS NFR BLD: 84.2 % (ref 44–72)
NEUTROPHILS NFR BLD: 84.4 % (ref 44–72)
NITRITE UR QL STRIP.AUTO: NEGATIVE
NRBC # BLD AUTO: 0 K/UL
NRBC # BLD AUTO: 0 K/UL
NRBC BLD-RTO: 0 /100 WBC
NRBC BLD-RTO: 0 /100 WBC
PH UR STRIP.AUTO: 5 [PH]
PLATELET # BLD AUTO: 226 K/UL (ref 164–446)
PLATELET # BLD AUTO: 233 K/UL (ref 164–446)
PMV BLD AUTO: 10.5 FL (ref 9–12.9)
PMV BLD AUTO: 11.5 FL (ref 9–12.9)
POTASSIUM SERPL-SCNC: 3.7 MMOL/L (ref 3.6–5.5)
PROT UR QL STRIP: 100 MG/DL
RBC # BLD AUTO: 4.03 M/UL (ref 4.7–6.1)
RBC # BLD AUTO: 4.12 M/UL (ref 4.7–6.1)
RBC # URNS HPF: ABNORMAL /HPF
RBC UR QL AUTO: NEGATIVE
RHODAMINE-AURAMINE STN SPEC: NORMAL
SIGNIFICANT IND 70042: NORMAL
SIGNIFICANT IND 70042: NORMAL
SITE SITE: NORMAL
SITE SITE: NORMAL
SODIUM SERPL-SCNC: 133 MMOL/L (ref 135–145)
SOURCE SOURCE: NORMAL
SOURCE SOURCE: NORMAL
SP GR UR STRIP.AUTO: 1.03
TRANS CELLS #/AREA URNS HPF: ABNORMAL /HPF
UROBILINOGEN UR STRIP.AUTO-MCNC: 1 MG/DL
VANCOMYCIN SERPL-MCNC: 15.7 UG/ML
WBC # BLD AUTO: 18.2 K/UL (ref 4.8–10.8)
WBC # BLD AUTO: 18.4 K/UL (ref 4.8–10.8)
WBC #/AREA URNS HPF: ABNORMAL /HPF

## 2018-09-30 PROCEDURE — 81001 URINALYSIS AUTO W/SCOPE: CPT

## 2018-09-30 PROCEDURE — 700105 HCHG RX REV CODE 258: Performed by: HOSPITALIST

## 2018-09-30 PROCEDURE — 700101 HCHG RX REV CODE 250: Performed by: HOSPITALIST

## 2018-09-30 PROCEDURE — 770006 HCHG ROOM/CARE - MED/SURG/GYN SEMI*

## 2018-09-30 PROCEDURE — 700105 HCHG RX REV CODE 258: Performed by: PHYSICIAN ASSISTANT

## 2018-09-30 PROCEDURE — 302242 IV POLE: Performed by: HOSPITALIST

## 2018-09-30 PROCEDURE — A9270 NON-COVERED ITEM OR SERVICE: HCPCS | Performed by: HOSPITALIST

## 2018-09-30 PROCEDURE — 80202 ASSAY OF VANCOMYCIN: CPT

## 2018-09-30 PROCEDURE — 700111 HCHG RX REV CODE 636 W/ 250 OVERRIDE (IP): Performed by: PHYSICIAN ASSISTANT

## 2018-09-30 PROCEDURE — 36415 COLL VENOUS BLD VENIPUNCTURE: CPT

## 2018-09-30 PROCEDURE — 83036 HEMOGLOBIN GLYCOSYLATED A1C: CPT

## 2018-09-30 PROCEDURE — 87086 URINE CULTURE/COLONY COUNT: CPT

## 2018-09-30 PROCEDURE — 80048 BASIC METABOLIC PNL TOTAL CA: CPT

## 2018-09-30 PROCEDURE — 700102 HCHG RX REV CODE 250 W/ 637 OVERRIDE(OP): Performed by: HOSPITALIST

## 2018-09-30 PROCEDURE — A9270 NON-COVERED ITEM OR SERVICE: HCPCS | Performed by: NURSE PRACTITIONER

## 2018-09-30 PROCEDURE — 99232 SBSQ HOSP IP/OBS MODERATE 35: CPT | Performed by: HOSPITALIST

## 2018-09-30 PROCEDURE — 85025 COMPLETE CBC W/AUTO DIFF WBC: CPT | Mod: 91

## 2018-09-30 PROCEDURE — 700111 HCHG RX REV CODE 636 W/ 250 OVERRIDE (IP): Performed by: HOSPITALIST

## 2018-09-30 PROCEDURE — 83735 ASSAY OF MAGNESIUM: CPT

## 2018-09-30 PROCEDURE — 82962 GLUCOSE BLOOD TEST: CPT | Mod: 91

## 2018-09-30 PROCEDURE — 700102 HCHG RX REV CODE 250 W/ 637 OVERRIDE(OP): Performed by: NURSE PRACTITIONER

## 2018-09-30 RX ORDER — LISINOPRIL AND HYDROCHLOROTHIAZIDE 20; 12.5 MG/1; MG/1
1 TABLET ORAL EVERY MORNING
Status: DISCONTINUED | OUTPATIENT
Start: 2018-09-30 | End: 2018-09-30

## 2018-09-30 RX ORDER — LISINOPRIL 20 MG/1
20 TABLET ORAL
Status: DISCONTINUED | OUTPATIENT
Start: 2018-09-30 | End: 2018-10-03

## 2018-09-30 RX ORDER — HYDROCHLOROTHIAZIDE 12.5 MG/1
12.5 TABLET ORAL
Status: DISCONTINUED | OUTPATIENT
Start: 2018-09-30 | End: 2018-10-10

## 2018-09-30 RX ORDER — LOVASTATIN 20 MG/1
40 TABLET ORAL EVERY MORNING
Status: DISCONTINUED | OUTPATIENT
Start: 2018-09-30 | End: 2018-10-16 | Stop reason: HOSPADM

## 2018-09-30 RX ADMIN — HEPARIN SODIUM 5000 UNITS: 5000 INJECTION, SOLUTION INTRAVENOUS; SUBCUTANEOUS at 06:04

## 2018-09-30 RX ADMIN — STANDARDIZED SENNA CONCENTRATE AND DOCUSATE SODIUM 2 TABLET: 8.6; 5 TABLET ORAL at 17:32

## 2018-09-30 RX ADMIN — POTASSIUM CHLORIDE AND SODIUM CHLORIDE: 900; 150 INJECTION, SOLUTION INTRAVENOUS at 21:05

## 2018-09-30 RX ADMIN — AMPICILLIN AND SULBACTAM 3 G: 2; 1 INJECTION, POWDER, FOR SOLUTION INTRAVENOUS at 06:04

## 2018-09-30 RX ADMIN — INSULIN HUMAN 10 UNITS: 100 INJECTION, SOLUTION PARENTERAL at 21:12

## 2018-09-30 RX ADMIN — INSULIN HUMAN 3 UNITS: 100 INJECTION, SOLUTION PARENTERAL at 11:52

## 2018-09-30 RX ADMIN — ACETAMINOPHEN 650 MG: 325 TABLET, FILM COATED ORAL at 14:38

## 2018-09-30 RX ADMIN — HEPARIN SODIUM 5000 UNITS: 5000 INJECTION, SOLUTION INTRAVENOUS; SUBCUTANEOUS at 13:57

## 2018-09-30 RX ADMIN — HYDROCHLOROTHIAZIDE 12.5 MG: 12.5 TABLET ORAL at 12:29

## 2018-09-30 RX ADMIN — AMPICILLIN AND SULBACTAM 3 G: 2; 1 INJECTION, POWDER, FOR SOLUTION INTRAVENOUS at 17:32

## 2018-09-30 RX ADMIN — VANCOMYCIN HYDROCHLORIDE 1200 MG: 100 INJECTION, POWDER, LYOPHILIZED, FOR SOLUTION INTRAVENOUS at 21:05

## 2018-09-30 RX ADMIN — HEPARIN SODIUM 5000 UNITS: 5000 INJECTION, SOLUTION INTRAVENOUS; SUBCUTANEOUS at 22:00

## 2018-09-30 RX ADMIN — INSULIN HUMAN 4 UNITS: 100 INJECTION, SOLUTION PARENTERAL at 16:12

## 2018-09-30 RX ADMIN — INSULIN HUMAN 3 UNITS: 100 INJECTION, SOLUTION PARENTERAL at 06:01

## 2018-09-30 RX ADMIN — LOVASTATIN 40 MG: 20 TABLET ORAL at 12:29

## 2018-09-30 RX ADMIN — VANCOMYCIN HYDROCHLORIDE 1200 MG: 100 INJECTION, POWDER, LYOPHILIZED, FOR SOLUTION INTRAVENOUS at 12:32

## 2018-09-30 RX ADMIN — POTASSIUM CHLORIDE AND SODIUM CHLORIDE: 900; 150 INJECTION, SOLUTION INTRAVENOUS at 10:02

## 2018-09-30 RX ADMIN — LISINOPRIL 20 MG: 20 TABLET ORAL at 12:28

## 2018-09-30 ASSESSMENT — PAIN SCALES - GENERAL
PAINLEVEL_OUTOF10: 7
PAINLEVEL_OUTOF10: 4
PAINLEVEL_OUTOF10: 0

## 2018-09-30 ASSESSMENT — ENCOUNTER SYMPTOMS
VOMITING: 0
SHORTNESS OF BREATH: 0
DIZZINESS: 0
CONSTIPATION: 0
HEADACHES: 0
ABDOMINAL PAIN: 0
WEAKNESS: 1
EYES NEGATIVE: 1
COUGH: 0
DIARRHEA: 0
PALPITATIONS: 0
PSYCHIATRIC NEGATIVE: 1
FALLS: 0

## 2018-09-30 NOTE — CARE PLAN
Problem: Safety  Goal: Will remain free from injury  Outcome: PROGRESSING AS EXPECTED      Problem: Infection  Goal: Will remain free from infection  Outcome: PROGRESSING AS EXPECTED      Problem: Pain Management  Goal: Pain level will decrease to patient's comfort goal  Outcome: PROGRESSING AS EXPECTED

## 2018-09-30 NOTE — CARE PLAN
Problem: Safety  Goal: Will remain free from injury  Outcome: PROGRESSING AS EXPECTED  Pt calls appropriately. Call light within reach, rounding in place, proactive offering mobility. Non skid socks, path free of clutter, assistive device in restroom.    Problem: Venous Thromboembolism (VTW)/Deep Vein Thrombosis (DVT) Prevention:  Goal: Patient will participate in Venous Thrombosis (VTE)/Deep Vein Thrombosis (DVT)Prevention Measures  Outcome: PROGRESSING AS EXPECTED  SCDs, heparin, ambulation    Problem: Knowledge Deficit  Goal: Knowledge of the prescribed therapeutic regimen will improve  Outcome: PROGRESSING AS EXPECTED  Discussed ambulation, PT/OT, SCDs, abx. Diabetic education ordered. LPS provided teaching to pt as well.    Problem: Pain Management  Goal: Pain level will decrease to patient's comfort goal  Outcome: PROGRESSING AS EXPECTED  Pt reports no pain until after ambulation, then medicated per MAR (Tylenol only per pt request). Rest, elevation, distraction, quiet environment.

## 2018-09-30 NOTE — OP REPORT
DATE OF SERVICE:  09/29/2018    PREOPERATIVE DIAGNOSES:  1.  Necrotic left fourth toe.  2.  Left dorsal foot abscess.    POSTOPERATIVE DIAGNOSES:  1.  Necrotic left fourth toe.  2.  Left dorsal foot abscess.    PROCEDURES:  1.  Left fourth toe amputation through MP joint.  2.  Irrigation and debridement of multiloculated dorsal foot abscess.    SURGEON:  Trip Ventura MD    ASSISTANT:  Lee Miramontes PA-C    ESTIMATED BLOOD LOSS:  Minimal.    INDICATIONS:  This is a 62-year-old male who presented with a necrotic fourth   toe and a large erythematous cellulitic foot overlying a dorsiflex dorsal foot   abscess.  Risks and benefits of irrigation, debridement, and toe amputation   were discussed, which include but not limited to bleeding, infection,   neurovascular damage, pain, stiffness, and need for the surgery.  He   understands all these risks and wished to proceed.    DESCRIPTION OF PROCEDURE:  Patient was sedated with LMA anesthesia and   administered preoperative antibiotics.  Left fourth toe was prepped and draped   in usual sterile fashion.  His fourth toe was amputated through the MP joint   with a fish-mouth incision.  A large amount of purulent fluid was encountered   in the dorsum of the foot as expected.  As a result, the incision was extended   6 cm proximal and the entire dorsal foot abscess was debrided of skin,   subcutaneous tissue, and underlying muscle in an excisional fashion with a   knife and rongeur.  Wounds were then irrigated, closed with nylon suture.    Sterile dressings were applied.  Patient tolerated the procedure well.    POSTOPERATIVE PLAN:  Patient to be returned to the care of the internal   medicine service on IV antibiotics while awaiting definitive culture results   for infectious disease consultation.       ____________________________________     TRIP VENTURA MD    AL[facility] / N[facility]    DD:  09/29/2018 17:09:19  DT:  09/29/2018 17:23:27    D#:  [id number]  Job#:  [id number]

## 2018-09-30 NOTE — PROGRESS NOTES
POD# 1  Necrotizing foot infection  S/P toe amputation  Awaiting operative culture results  WBAT through heel  ABX per ID

## 2018-09-30 NOTE — PROGRESS NOTES
LIMB PRESERVATION SERVICE INITIAL CONSULT    REASON FOR LPS CONSULTATION: S/P Left 4th Toe Amp through MP joint / I and D with Dr Lopez 9/29/18    History of Present Illness:     Sumit Estrada is a 62 y.o. male, with a past medical history that includes uncontrolled type 2 diabetes and HTN, admitted 9/29/2018 for Sepsis (HCC).   LPS has been consulted for post op care of his amp site of the Left 4th toe.This wound started in early September of 2018 when some furniture was dropped on his left foot. Then a few days prior to admission the pt noticed severe swelling and the left fourth toe turning black.    The pt was only using ice on the wound.  IV antibiotics were started on this admission.  Infectious diseases has been consulted.    Xray has been done  and did show gas consistent with active infection  MRI has not been done  Ortho was consulted and Dr Lopez preformed the amputation  Pt was diagnosed with type 2 diabetes 20 years ago, and is currently managing with oral meds.  Pt checks their blood sugars every other day and reports that these typically run around the high 100s.  They have not had previous diabetes education.  They do have numbness in his feet.  They usually wears work shoes. They do not check their feet routinely. They have not had previous foot ulcers or foot surgery.  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     Tobacco Abuse Hx:   reports that he has never smoked. He has never used smokeless tobacco.    Alcohol Abuse Hx:   reports that he drinks alcohol.    Drug Abuse Hx:   reports that he does not use drugs.    PAST MEDICAL HISTORY:   Past Medical History:   Diagnosis Date   • Diabetes mellitus (HCC)    • HTN (hypertension)        MEDICATIONS:   Current Facility-Administered Medications   Medication Dose   • vancomycin 1,200 mg in  mL IVPB  15 mg/kg   • lovastatin (MEVACOR) tablet 40 mg  40 mg   • lisinopril (PRINIVIL) tablet 20 mg  20 mg   • hydroCHLOROthiazide  (HYDRODIURIL) tablet 12.5 mg  12.5 mg   • senna-docusate (PERICOLACE or SENOKOT S) 8.6-50 MG per tablet 2 Tab  2 Tab    And   • polyethylene glycol/lytes (MIRALAX) PACKET 1 Packet  1 Packet    And   • magnesium hydroxide (MILK OF MAGNESIA) suspension 30 mL  30 mL    And   • bisacodyl (DULCOLAX) suppository 10 mg  10 mg   • 0.9 % NaCl with KCl 20 mEq infusion     • heparin injection 5,000 Units  5,000 Units   • ondansetron (ZOFRAN) syringe/vial injection 4 mg  4 mg   • ondansetron (ZOFRAN ODT) dispertab 4 mg  4 mg   • promethazine (PHENERGAN) tablet 12.5-25 mg  12.5-25 mg   • promethazine (PHENERGAN) suppository 12.5-25 mg  12.5-25 mg   • prochlorperazine (COMPAZINE) injection 5-10 mg  5-10 mg   • acetaminophen (TYLENOL) tablet 650 mg  650 mg   • insulin regular (HUMULIN R) injection 3-14 Units  3-14 Units    And   • glucose 4 g chewable tablet 16 g  16 g    And   • dextrose 50% (D50W) injection 25 mL  25 mL   • oxyCODONE immediate-release (ROXICODONE) tablet 5-10 mg  5-10 mg   • morphine (pf) 4 mg/ml injection 1-4 mg  1-4 mg   • ampicillin/sulbactam (UNASYN) 3 g in  mL IVPB  3 g   • MD Alert...Vancomycin per Pharmacy         ALLERGIES:  No Known Allergies     PAST SURGICAL HISTORY: History reviewed. No pertinent surgical history.    SOCIAL HISTORY:   Social History     Social History   • Marital status: Single     Spouse name: N/A   • Number of children: N/A   • Years of education: N/A     Social History Main Topics   • Smoking status: Never Smoker   • Smokeless tobacco: Never Used   • Alcohol use Yes      Comment: occ   • Drug use: No   • Sexual activity: Not on file     Other Topics Concern   • Not on file     Social History Narrative   • No narrative on file        FAMILY HISTORY: History reviewed. No pertinent family history.    REVIEW OF SYSTEMS:   Constitutional: Negative for chills, fever   Respiratory: Negative for cough and shortness of breath.    Cardiovascular:Negative for chest pain, swelling in  "legs, and claudication.   Gastrointestinal: Negative for constipation, diarrhea, nausea and vomiting.   Genitourinary: Negative for dysuria.   Neurological: numbness in feet and lower legs    DIAGNOSTIC DATA:     Lab Results   Component Value Date/Time    GLUCOSE 186 (H) 09/30/2018 07:46 AM        Lab Results   Component Value Date/Time    HBA1C 8.3 (H) 09/30/2018 07:46 AM          WBC   Date/Time Value Ref Range Status   09/30/2018 10:16 AM 18.4 (H) 4.8 - 10.8 K/uL Final     Recent Labs      09/29/18   1228  09/30/18   0747  09/30/18   1016   WBC  21.7*  18.2*  18.4*   RBC  4.92  4.12*  4.03*   HEMOGLOBIN  15.4  12.8*  12.9*   HEMATOCRIT  43.1  36.4*  36.3*   MCV  87.6  88.3  90.1   MCH  31.3  31.1  32.0   MCHC  35.7*  35.2  35.5*   RDW  39.8  39.8  40.4   PLATELETCT  248  226  233   MPV  10.5  10.5  11.5     Recent Labs      09/29/18   1228  09/30/18   0746   SODIUM  131*  133*   POTASSIUM  3.7  3.7   CHLORIDE  97  103   CO2  22  22   GLUCOSE  282*  186*   BUN  42*  28*         ESR:   86    CRP:      9.06    X-ray:   9/29/18  DX-FOOT-COMPLETE 3+ LEFT   Final Result      1.  No radiographic evidence for osteomyelitis      2.  Gas associated with the lateral forefoot      DX-CHEST-PORTABLE (1 VIEW)   Final Result      Negative single view of the chest.        MRI: NA    Arterial studies:  Pedal Pulses Present    Microbiology: PEND      PHYSICAL EXAMINATION:     Vitals  /76   Pulse 81   Temp 36.9 °C (98.5 °F)   Resp 16   Ht 1.74 m (5' 8.5\")   Wt 79.4 kg (175 lb)   SpO2 96%   BMI 26.22 kg/m²      Pain:        Patient resting comfortably    General Appearance:  Well developed, well nourished, in no acute distress    Lower Extremity Assessment:  Edema    Pulses: RLE- DP pulse +2 palpable; PT pulse +2 palpable               LLE- DP pulse +2 palpable; PT pulse +2 palpable    ROM dorsi/plantar  Structural /mechanical      Sensory Assessment  Feet Insensate to light touch      Wound Assessment:    Wound " Characteristics     PEND POD#2                                                                       Location:  Initial Evaluation  Date:9/30/2018   Tissue Type and %: 100%    Periwound: ..., Callus, Edematous   Drainage: Minimal Purulant   Exposed structures None   Wound Edges:   Attached   Odor:    S&S of Infection:   Edema/Erythema   Edema: Localized at Site   Sensation: Insensate               Measurements: Initial Evaluation  Date:9/30/2018   Length (cm)    Width (cm)    Depth (cm)     Tract/undermine          Procedures:       Debridement :     Cleansed with:                                                                          Periwound protected with:   Primary dressing:   Secondary Dressing:   Other:       Patient Education:    Implications of loss of protective sensation (LOPS) discussed with patient- including increased risk for amputation.  Advised to check foot at least daily, moisturize foot, and to always wear protective foot wear.      Plan:        1. Wound:      Offloading:  Offloading shoe PEND POD#2    Dressing Orders Updated. Skin Care Updated.               Nursing to change every 48 HOURS and PRN for Saturation or Dislodgement    2. Infection: ID consulted    3. Diabetes   Diabetes Educator ordered: A1C is 8.3% Pt educated on keeping BS less than 150 to control infection and promote wound healing         4. Antibiotics: Per ID recommendation    5. Consults: ID      DISCHARGE PLAN:    Disposition: TBD    Follow-up: Dr Lopez At [facility]    Other:       Vasiliy Chow R.N.

## 2018-09-30 NOTE — H&P
Hospital Medicine History & Physical Note    Date of Service  9/29/2018    Primary Care Physician  Pcp Pt States None    Consultants  I discussed with Dr. Lopez, orthopedics, for consultation.     Code Status  full    Chief Complaint  Left foot infection    History of Presenting Illness  62 y.o. male who presented 9/29/2018 with redness and swelling of the left foot. Mr. Estrada is a past medical history of diabetes, hypertension, and dyslipidemia that had the misfortune of dropping some furniture on his left foot about 3 weeks ago.  About 1 week ago his left foot again swelling in the left fourth toe turning black.  Today he presented to the [facility] student outreach clinic and saw Dr. Pierre who recommended urgent transfer to the ER. Here, patient was found to have a marked leukocytosis setting of infected diabetic foot.  There is gas seen on the x-ray consistent with active infection.  He will be kept n.p.o. plans to go to the operating room tonight.  Patient his long-term girlfriend stand that he very well may lose the toe during surgery though with this is quite imperative to undergo surgery as soon as possible.    Review of Systems  Review of Systems   Constitutional: Negative for chills and fever.   Respiratory: Negative for shortness of breath.    Cardiovascular: Negative for chest pain.   Musculoskeletal:        Left foot pain  Left 4th toe has turned black   All other systems reviewed and are negative.      Past Medical History   has a past medical history of Diabetes mellitus (HCC) and HTN (hypertension).    Surgical History   has no past surgical history on file. none    Family History  family history is not on file. no family history of coronary disease.     Social History   reports that he has never smoked. He has never used smokeless tobacco. He reports that he drinks alcohol. He reports that he does not use drugs.    Allergies  No Known Allergies    Medications  Prior to Admission  Medications   Prescriptions Last Dose Informant Patient Reported? Taking?   glimepiride (AMARYL) 4 MG Tab 2 weeks at OUT Patient Yes Yes   Sig: Take 4 mg by mouth every morning.   lisinopril-hydrochlorothiazide (PRINZIDE, ZESTORETIC) 20-12.5 MG per tablet 2 weeks at OUT Patient Yes Yes   Sig: Take 1 Tab by mouth every morning.   lovastatin (MEVACOR) 40 MG tablet 9/29/2018 at 0500 Patient Yes Yes   Sig: Take 40 mg by mouth every morning.   metFORMIN (GLUCOPHAGE) 850 MG Tab 9/29/2018 at 0500 Patient Yes Yes   Sig: Take 850 mg by mouth 3 times a day.      Facility-Administered Medications: None       Physical Exam  Temp:  [36 °C (96.8 °F)-38.8 °C (101.8 °F)] 37.2 °C (99 °F)  Pulse:  [] 92  Resp:  [13-18] 16  BP: (118-166)/(68-91) 157/89    Physical Exam   Constitutional: He is oriented to person, place, and time. No distress.   HENT:   Head: Normocephalic and atraumatic.   Neck: Neck supple.   Cardiovascular: Normal rate and regular rhythm.    No murmur heard.  Pulmonary/Chest: Effort normal. No respiratory distress. He has no wheezes.   Abdominal: Soft. He exhibits no distension. There is no tenderness.   Musculoskeletal:   Left fourth toe is black and necrotic appearing  Foot is red on the lateral aspect in the dorsum and swollen though not particularly tender to the touch   Neurological: He is alert and oriented to person, place, and time.   Skin: Skin is warm and dry. He is not diaphoretic.   Psychiatric: He has a normal mood and affect. His behavior is normal.   Nursing note and vitals reviewed.      Laboratory:  Recent Labs      09/29/18   1228   WBC  21.7*   RBC  4.92   HEMOGLOBIN  15.4   HEMATOCRIT  43.1   MCV  87.6   MCH  31.3   MCHC  35.7*   RDW  39.8   PLATELETCT  248   MPV  10.5     Recent Labs      09/29/18   1228   SODIUM  131*   POTASSIUM  3.7   CHLORIDE  97   CO2  22   GLUCOSE  282*   BUN  42*   CREATININE  1.75*   CALCIUM  9.2     Recent Labs      09/29/18   1228   ALTSGPT  10   ASTSGOT  11*    ALKPHOSPHAT  74   TBILIRUBIN  1.0   GLUCOSE  282*                 No results for input(s): TROPONINI in the last 72 hours.    Urinalysis:    No results found     Imaging:  DX-FOOT-COMPLETE 3+ LEFT   Final Result      1.  No radiographic evidence for osteomyelitis      2.  Gas associated with the lateral forefoot      DX-CHEST-PORTABLE (1 VIEW)   Final Result      Negative single view of the chest.            Assessment/Plan:  I anticipate this patient will require at least two midnights for appropriate medical management, necessitating inpatient admission.    * Sepsis (HCC)- (present on admission)   Assessment & Plan    Secondary to left foot infection  WBC 21  Cultures have been drawn.  IV unasyn and vancomycin ordered.  NPO for surgery  IV fluids ordered.        Diabetic foot infection (HCC)- (present on admission)   Assessment & Plan    With necrotic left 4th toe and gas on xray  IV antibiotics  NPO status  Orthopedic consult by Dr. Lopez  IV narcotic pain medications ordered.   Limb preservation service consulted.         LEONEL (acute kidney injury) ([facility])- (present on admission)   Assessment & Plan    Cr is 1.75  IV fluids will be given and repeat labs ordered for the morning.         Essential hypertension- (present on admission)   Assessment & Plan    Hold home lisinopril-hctz due to acute kidney injury          Dyslipidemia- (present on admission)   Assessment & Plan    History of  He has been on Lovastatin        Type 2 diabetes mellitus with complication, without long-term current use of insulin ([facility])- (present on admission)   Assessment & Plan    Glycohemoglobin ordered  Hold home Glimepiride and metformin  Sliding scale insulin ordered.             VTE prophylaxis: SCDs

## 2018-09-30 NOTE — ASSESSMENT & PLAN NOTE
-better BP control. Continue increased dose of lisinopril 40 mg daily, new norvasc, and home dose hydrochlorothiazide, and metoprolol.  Continue to monitor blood pressure closely as may need further dose adjustment of his antihypertensives.

## 2018-09-30 NOTE — ASSESSMENT & PLAN NOTE
-s/p fourth toe amputation left foot on 9/29/2018. S/p repeat I&D on 10/10. Blood cultures negative.  Wound culture Streptococcus agalactiae (Group B)  -needs 6 weeks of IV antibiotics. Continue IV Unasyn until 11/10. No payor source for SNF for IV antibiotics, complete course of IV antibiotics in the hospital vs [facility] for [facility].   -Continue blood glucose control, goal<150.  -LPS following.

## 2018-09-30 NOTE — ASSESSMENT & PLAN NOTE
-resolved  -s/p Left fourth toe amputation through MP joint with irrigation and debridement of multiloculated dorsal foot abscess.  -leukocytosis resolved   -afebrile  -Blood cultures negative to date. Wound culture Streptococcus agalactiae (Group B) Heavy growth   -ID   -IV ABX

## 2018-09-30 NOTE — PROGRESS NOTES
"Pharmacy Kinetics 62 y.o. male on vancomycin day # 2 2018    Currently received Vancomycin Loading Dose (2000mg  at 1334)    Indication for Treatment: Left toe cellulitis, diabetic foot    Pertinent history per medical record:  Admitted on 2018 for sepsis / cellulitis of left toe / diabetic foot.  Sumit Estrada is a 62 y.o. diabetic male who presented to the ER for a wound check of his left fourth toe. The patient reportedly dropped an object on his toe a week ago and reports having chills last night. He presents with a leukocytosis, and an elevated c-reactive protein. Surgery consulted and patient is s/p left toe amputation on .  Foot x-ray negative for osteomyelitis but noted gas. .    Other antibiotics: Unasyn 3g q6hr    Allergies: Patient has no known allergies.     List concerns for renal function: sepsis (LEONEL; now resolved); DM    Pertinent cultures to date:   2018: Blood culture 2/2 - NGTD  2018: Wound culture w/ gram stain - NGTD; preliminary results    Recent Labs      18   1228  18   0747   WBC  21.7*  18.2*   NEUTSPOLYS  84.10*  84.20*     Recent Labs      18   1228  18   0746   BUN  42*  28*   CREATININE  1.75*  1.02   ALBUMIN  3.6   --      Recent Labs      18   0746   VANCORANDOM  15.7     Intake/Output Summary (Last 24 hours) at 18 0946  Last data filed at 18 0442   Gross per 24 hour   Intake              640 ml   Output             1350 ml   Net             -710 ml      Blood pressure 120/74, pulse 89, temperature 37.3 °C (99.2 °F), resp. rate 16, height 1.74 m (5' 8.5\"), weight 79.4 kg (175 lb), SpO2 95 %. Temp (24hrs), Av.4 °C (99.4 °F), Min:36 °C (96.8 °F), Max:38.8 °C (101.8 °F)      A/P   1. Vancomycin dose change: begin 1200mg q12hr (15mg/kg) (1030, 2230)  2. Next vancomycin level: prior to 3rd dose (30 on 10/1; ordered)  3. Goal trough: 12-16 mcg/mL  4. Comments: Patient is s/p toe amputation on .  Wound " cultures still pending.  Renal function significantly improved, unclear renal function baseline, will continue to monitor.  WBC improving, while still elevated, and afebrile overnight.  Anticipate increased clearance of vancomycin with improved renal function, will dose q12hr.  Monitor culture results, clinical improvement and de-escalate antibiotic therapy as appropriate.    Brian Perry Pharm.D.

## 2018-09-30 NOTE — ASSESSMENT & PLAN NOTE
-fair BG control. Continue increased dose of glimepiride, and metformin. HbA1c 8.3.   -Continue sliding scale insulin coverage. Accu-Check before meals and at bedtime.  Diabetic diet.

## 2018-09-30 NOTE — PROGRESS NOTES
[facility] Medicine Daily Progress Note    Date of Service  9/30/2018    Chief Complaint  62 y.o. male with PMH significant for DM, HTN, and dyslipidemia admitted 9/29/2018 with redness swelling of left foot.  He dropped a piece of furniture on his left foot about 3 weeks ago.  About 1 week ago he noticed left fourth toe necrosis with associated foot swelling.    Hospital Course   He underwent left fourth toe amputation through MP joint and irrigation and debridement of multiloculated dorsal foot abscesses by Dr. Lopez on 9/29/2018.     Interval Problem Update  -POD #1  -Leukocytosis improving.  Continue IV Unasyn  -LEONEL improving.  Continue IV fluids  -He reports no pain this morning  -PT eval pending    Consultants/Specialty  -Orthopedics - Dr. Lopez    Code Status  FULL    Disposition  Undetermined at this time pending PT eval.    Review of Systems  Review of Systems   Constitutional: Positive for malaise/fatigue.   HENT: Negative.    Eyes: Negative.    Respiratory: Negative for cough and shortness of breath.    Cardiovascular: Positive for leg swelling (LLE). Negative for chest pain and palpitations.   Gastrointestinal: Negative for abdominal pain, constipation, diarrhea and vomiting.   Genitourinary: Negative.    Musculoskeletal: Positive for joint pain (left foot). Negative for falls.   Skin: Negative.    Neurological: Positive for weakness. Negative for dizziness and headaches.   Psychiatric/Behavioral: Negative.    All other systems reviewed and are negative.       Physical Exam  Temp:  [36 °C (96.8 °F)-38.8 °C (101.8 °F)] 37.3 °C (99.2 °F)  Pulse:  [] 89  Resp:  [13-18] 16  BP: (118-166)/(66-91) 120/74    Physical Exam   Constitutional: He is oriented to person, place, and time. Vital signs are normal. He appears well-developed. He is cooperative.  Non-toxic appearance. He appears ill. No distress.   Sitting up in bed in no acute distress.   HENT:   Head: Normocephalic.   Right Ear: Hearing  normal.   Left Ear: Hearing normal.   Nose: Nose normal.   Mouth/Throat: Oropharynx is clear and moist and mucous membranes are normal.   Eyes: Conjunctivae and lids are normal. No scleral icterus.   Neck: Phonation normal. No JVD present.   Cardiovascular: Normal rate and normal heart sounds.  Exam reveals decreased pulses.    1+ pedal/ankle edema LLE   Pulmonary/Chest: Effort normal and breath sounds normal.   Abdominal: Soft. Normal appearance. He exhibits no distension. There is no tenderness. There is no rebound.   Musculoskeletal:   Decreased ROM left ankle/foot   Neurological: He is alert and oriented to person, place, and time. No cranial nerve deficit. GCS eye subscore is 4. GCS verbal subscore is 5. GCS motor subscore is 6.   Skin: Skin is warm and dry.   Left foot surgical dressing clean dry intact without any drainage   Psychiatric: He has a normal mood and affect. His speech is normal. Judgment normal. Cognition and memory are normal.   Nursing note and vitals reviewed.      Fluids    Intake/Output Summary (Last 24 hours) at 09/30/18 1035  Last data filed at 09/30/18 0442   Gross per 24 hour   Intake              640 ml   Output             1350 ml   Net             -710 ml       Laboratory  Recent Labs      09/29/18   1228  09/30/18   0747   WBC  21.7*  18.2*   RBC  4.92  4.12*   HEMOGLOBIN  15.4  12.8*   HEMATOCRIT  43.1  36.4*   MCV  87.6  88.3   MCH  31.3  31.1   MCHC  35.7*  35.2   RDW  39.8  39.8   PLATELETCT  248  226   MPV  10.5  10.5     Recent Labs      09/29/18   1228  09/30/18   0746   SODIUM  131*  133*   POTASSIUM  3.7  3.7   CHLORIDE  97  103   CO2  22  22   GLUCOSE  282*  186*   BUN  42*  28*   CREATININE  1.75*  1.02   CALCIUM  9.2  8.2*                   Imaging  DX-FOOT-COMPLETE 3+ LEFT   Final Result      1.  No radiographic evidence for osteomyelitis      2.  Gas associated with the lateral forefoot      DX-CHEST-PORTABLE (1 VIEW)   Final Result      Negative single view of the  chest.           Assessment/Plan  * Amputated toe of left foot (HCC)   Assessment & Plan    -4th toe  -By Dr. Lopez 9/29/2018        Diabetic foot infection (HCC)- (present on admission)   Assessment & Plan    -Status post fourth toe amputation left foot on 9/29/2018 by orthopedic surgery  -Blood cultures negative  -Limb preservation services  -Analgesic pain control  -Diabetic education  -Hemoglobin A1c pending        Sepsis ([facility])- (present on admission)   Assessment & Plan    -Improving after surgical intervention  -Hemodynamically stable  -Leukocytosis improving  -Blood cultures negative to date  -IV ABX          LEONEL (acute kidney injury) ([facility])- (present on admission)   Assessment & Plan    -Improving with IVF hydration  -Trend labs        Essential hypertension- (present on admission)   Assessment & Plan    -LEONEL now resolved.  Will restart home lisinopril/hydrochlorothiazide today            Dyslipidemia- (present on admission)   Assessment & Plan    -Home lovastatin        Type 2 diabetes mellitus with complication, without long-term current use of insulin ([facility])- (present on admission)   Assessment & Plan    -A1c pending  -Holding his home oral agents for now  -SSI  -Diabetic diet             VTE prophylaxis: Heparin    GUS Atkins

## 2018-10-01 LAB
ALBUMIN SERPL BCP-MCNC: 2.5 G/DL (ref 3.2–4.9)
ALBUMIN/GLOB SERPL: 0.8 G/DL
ALP SERPL-CCNC: 65 U/L (ref 30–99)
ALT SERPL-CCNC: 11 U/L (ref 2–50)
ANION GAP SERPL CALC-SCNC: 10 MMOL/L (ref 0–11.9)
AST SERPL-CCNC: 13 U/L (ref 12–45)
BASOPHILS # BLD AUTO: 0.3 % (ref 0–1.8)
BASOPHILS # BLD: 0.05 K/UL (ref 0–0.12)
BILIRUB SERPL-MCNC: 0.7 MG/DL (ref 0.1–1.5)
BUN SERPL-MCNC: 30 MG/DL (ref 8–22)
CALCIUM SERPL-MCNC: 8 MG/DL (ref 8.5–10.5)
CHLORIDE SERPL-SCNC: 104 MMOL/L (ref 96–112)
CO2 SERPL-SCNC: 20 MMOL/L (ref 20–33)
CREAT SERPL-MCNC: 1.09 MG/DL (ref 0.5–1.4)
EOSINOPHIL # BLD AUTO: 0.15 K/UL (ref 0–0.51)
EOSINOPHIL NFR BLD: 1 % (ref 0–6.9)
ERYTHROCYTE [DISTWIDTH] IN BLOOD BY AUTOMATED COUNT: 39.5 FL (ref 35.9–50)
GLOBULIN SER CALC-MCNC: 3.2 G/DL (ref 1.9–3.5)
GLUCOSE BLD-MCNC: 158 MG/DL (ref 65–99)
GLUCOSE BLD-MCNC: 169 MG/DL (ref 65–99)
GLUCOSE BLD-MCNC: 206 MG/DL (ref 65–99)
GLUCOSE BLD-MCNC: 272 MG/DL (ref 65–99)
GLUCOSE BLD-MCNC: 332 MG/DL (ref 65–99)
GLUCOSE SERPL-MCNC: 176 MG/DL (ref 65–99)
HCT VFR BLD AUTO: 37.2 % (ref 42–52)
HGB BLD-MCNC: 12.9 G/DL (ref 14–18)
IMM GRANULOCYTES # BLD AUTO: 0.09 K/UL (ref 0–0.11)
IMM GRANULOCYTES NFR BLD AUTO: 0.6 % (ref 0–0.9)
LYMPHOCYTES # BLD AUTO: 1.33 K/UL (ref 1–4.8)
LYMPHOCYTES NFR BLD: 8.5 % (ref 22–41)
MCH RBC QN AUTO: 30.4 PG (ref 27–33)
MCHC RBC AUTO-ENTMCNC: 34.7 G/DL (ref 33.7–35.3)
MCV RBC AUTO: 87.7 FL (ref 81.4–97.8)
MONOCYTES # BLD AUTO: 1.14 K/UL (ref 0–0.85)
MONOCYTES NFR BLD AUTO: 7.3 % (ref 0–13.4)
NEUTROPHILS # BLD AUTO: 12.87 K/UL (ref 1.82–7.42)
NEUTROPHILS NFR BLD: 82.3 % (ref 44–72)
NRBC # BLD AUTO: 0 K/UL
NRBC BLD-RTO: 0 /100 WBC
PLATELET # BLD AUTO: 248 K/UL (ref 164–446)
PMV BLD AUTO: 10.7 FL (ref 9–12.9)
POTASSIUM SERPL-SCNC: 3.9 MMOL/L (ref 3.6–5.5)
PROT SERPL-MCNC: 5.7 G/DL (ref 6–8.2)
RBC # BLD AUTO: 4.24 M/UL (ref 4.7–6.1)
SODIUM SERPL-SCNC: 134 MMOL/L (ref 135–145)
VANCOMYCIN TROUGH SERPL-MCNC: 26.9 UG/ML (ref 10–20)
WBC # BLD AUTO: 15.6 K/UL (ref 4.8–10.8)

## 2018-10-01 PROCEDURE — A9270 NON-COVERED ITEM OR SERVICE: HCPCS | Performed by: NURSE PRACTITIONER

## 2018-10-01 PROCEDURE — 97161 PT EVAL LOW COMPLEX 20 MIN: CPT

## 2018-10-01 PROCEDURE — 770006 HCHG ROOM/CARE - MED/SURG/GYN SEMI*

## 2018-10-01 PROCEDURE — G8978 MOBILITY CURRENT STATUS: HCPCS | Mod: CJ

## 2018-10-01 PROCEDURE — 99255 IP/OBS CONSLTJ NEW/EST HI 80: CPT | Performed by: INTERNAL MEDICINE

## 2018-10-01 PROCEDURE — 85025 COMPLETE CBC W/AUTO DIFF WBC: CPT

## 2018-10-01 PROCEDURE — 700111 HCHG RX REV CODE 636 W/ 250 OVERRIDE (IP): Performed by: PHYSICIAN ASSISTANT

## 2018-10-01 PROCEDURE — 700105 HCHG RX REV CODE 258: Performed by: HOSPITALIST

## 2018-10-01 PROCEDURE — 700111 HCHG RX REV CODE 636 W/ 250 OVERRIDE (IP): Performed by: HOSPITALIST

## 2018-10-01 PROCEDURE — 99232 SBSQ HOSP IP/OBS MODERATE 35: CPT | Performed by: HOSPITALIST

## 2018-10-01 PROCEDURE — G8979 MOBILITY GOAL STATUS: HCPCS | Mod: CI

## 2018-10-01 PROCEDURE — 80053 COMPREHEN METABOLIC PANEL: CPT

## 2018-10-01 PROCEDURE — 700105 HCHG RX REV CODE 258: Performed by: PHYSICIAN ASSISTANT

## 2018-10-01 PROCEDURE — 700102 HCHG RX REV CODE 250 W/ 637 OVERRIDE(OP): Performed by: NURSE PRACTITIONER

## 2018-10-01 PROCEDURE — 82962 GLUCOSE BLOOD TEST: CPT

## 2018-10-01 PROCEDURE — 80202 ASSAY OF VANCOMYCIN: CPT

## 2018-10-01 PROCEDURE — 700101 HCHG RX REV CODE 250: Performed by: HOSPITALIST

## 2018-10-01 PROCEDURE — 36415 COLL VENOUS BLD VENIPUNCTURE: CPT

## 2018-10-01 RX ORDER — DEXTROSE MONOHYDRATE 25 G/50ML
25 INJECTION, SOLUTION INTRAVENOUS
Status: DISCONTINUED | OUTPATIENT
Start: 2018-10-01 | End: 2018-10-03

## 2018-10-01 RX ORDER — GLIMEPIRIDE 4 MG/1
4 TABLET ORAL
Status: DISCONTINUED | OUTPATIENT
Start: 2018-10-01 | End: 2018-10-11

## 2018-10-01 RX ADMIN — HEPARIN SODIUM 5000 UNITS: 5000 INJECTION, SOLUTION INTRAVENOUS; SUBCUTANEOUS at 06:02

## 2018-10-01 RX ADMIN — LOVASTATIN 40 MG: 20 TABLET ORAL at 06:01

## 2018-10-01 RX ADMIN — METFORMIN HYDROCHLORIDE 850 MG: 850 TABLET ORAL at 18:14

## 2018-10-01 RX ADMIN — AMPICILLIN AND SULBACTAM 3 G: 2; 1 INJECTION, POWDER, FOR SOLUTION INTRAVENOUS at 14:11

## 2018-10-01 RX ADMIN — AMPICILLIN AND SULBACTAM 3 G: 2; 1 INJECTION, POWDER, FOR SOLUTION INTRAVENOUS at 23:58

## 2018-10-01 RX ADMIN — HEPARIN SODIUM 5000 UNITS: 5000 INJECTION, SOLUTION INTRAVENOUS; SUBCUTANEOUS at 13:26

## 2018-10-01 RX ADMIN — AMPICILLIN AND SULBACTAM 3 G: 2; 1 INJECTION, POWDER, FOR SOLUTION INTRAVENOUS at 00:01

## 2018-10-01 RX ADMIN — VANCOMYCIN HYDROCHLORIDE 1200 MG: 100 INJECTION, POWDER, LYOPHILIZED, FOR SOLUTION INTRAVENOUS at 10:52

## 2018-10-01 RX ADMIN — AMPICILLIN AND SULBACTAM 3 G: 2; 1 INJECTION, POWDER, FOR SOLUTION INTRAVENOUS at 18:14

## 2018-10-01 RX ADMIN — AMPICILLIN AND SULBACTAM 3 G: 2; 1 INJECTION, POWDER, FOR SOLUTION INTRAVENOUS at 05:59

## 2018-10-01 RX ADMIN — METOPROLOL TARTRATE 25 MG: 25 TABLET, FILM COATED ORAL at 18:14

## 2018-10-01 RX ADMIN — INSULIN HUMAN 3 UNITS: 100 INJECTION, SOLUTION PARENTERAL at 06:03

## 2018-10-01 RX ADMIN — GLIMEPIRIDE 4 MG: 4 TABLET ORAL at 13:24

## 2018-10-01 RX ADMIN — METFORMIN HYDROCHLORIDE 850 MG: 850 TABLET ORAL at 11:37

## 2018-10-01 RX ADMIN — LISINOPRIL 20 MG: 20 TABLET ORAL at 06:01

## 2018-10-01 RX ADMIN — METOPROLOL TARTRATE 25 MG: 25 TABLET, FILM COATED ORAL at 11:37

## 2018-10-01 RX ADMIN — HYDROCHLOROTHIAZIDE 12.5 MG: 12.5 TABLET ORAL at 06:01

## 2018-10-01 RX ADMIN — POTASSIUM CHLORIDE AND SODIUM CHLORIDE: 900; 150 INJECTION, SOLUTION INTRAVENOUS at 05:58

## 2018-10-01 RX ADMIN — HEPARIN SODIUM 5000 UNITS: 5000 INJECTION, SOLUTION INTRAVENOUS; SUBCUTANEOUS at 20:33

## 2018-10-01 ASSESSMENT — GAIT ASSESSMENTS
ASSISTIVE DEVICE: FRONT WHEEL WALKER
DEVIATION: ANTALGIC
GAIT LEVEL OF ASSIST: STAND BY ASSIST
DISTANCE (FEET): 20

## 2018-10-01 ASSESSMENT — ENCOUNTER SYMPTOMS
VOMITING: 0
CHILLS: 0
DIARRHEA: 0
FALLS: 0
SHORTNESS OF BREATH: 0
FOCAL WEAKNESS: 1
EYES NEGATIVE: 1
ABDOMINAL PAIN: 0
BACK PAIN: 0
PALPITATIONS: 0
WHEEZING: 0
FEVER: 0
COUGH: 0
NAUSEA: 0
PSYCHIATRIC NEGATIVE: 1

## 2018-10-01 ASSESSMENT — COGNITIVE AND FUNCTIONAL STATUS - GENERAL
CLIMB 3 TO 5 STEPS WITH RAILING: A LITTLE
SUGGESTED CMS G CODE MODIFIER MOBILITY: CJ
MOBILITY SCORE: 22
WALKING IN HOSPITAL ROOM: A LITTLE

## 2018-10-01 ASSESSMENT — PAIN SCALES - GENERAL
PAINLEVEL_OUTOF10: 1
PAINLEVEL_OUTOF10: 3
PAINLEVEL_OUTOF10: 0
PAINLEVEL_OUTOF10: 0
PAINLEVEL_OUTOF10: 1
PAINLEVEL_OUTOF10: 2

## 2018-10-01 NOTE — PROGRESS NOTES
MD Paredes at [facility]. Request to SL pt, order received. Notified MD that per PT, pt will need order for fww.

## 2018-10-01 NOTE — CARE PLAN
Problem: Knowledge Deficit  Goal: Knowledge of disease process/condition, treatment plan, diagnostic tests, and medications will improve  Discussed day's POC including finding out about offloading shoe, Abx planning from cultures, mobility, pain control.    Problem: Mobility  Goal: Risk for activity intolerance will decrease  Outcome: PROGRESSING AS EXPECTED  C/o increased pain after ambulation and is therefore reluctant to mobilize frequently. Refusing prn meds stronger then Tylenol. Discussed non-phrmacological pain management strategies and importance of mobilization minimum 3x day. Pt OOB once already this a with PT.

## 2018-10-01 NOTE — THERAPY
"63 y/o male adm for left foot pain dx: sepsis s/p left 4th toe amp, heel WB LLE. Disc with RN regarding disc with ortho regarding  proper footwear to promote heel WB LLE. Acute PT to address transfers, gait with FWW, balance to achieve higherst level of function and facilitate a safe DC.    Physical Therapy Evaluation completed.   Bed Mobility:  Supine to Sit: Supervised  Transfers: Sit to Stand: Stand by Assist  Gait: Level Of Assist: Stand by Assist with Front-Wheel Walker       Plan of Care: Will benefit from Physical Therapy 3 times per week  Discharge Recommendations: Equipment: Front-Wheel Walker. Post-acute therapy Discharge to home with outpatient or home health for additional skilled therapy services.    See \"Rehab Therapy-Acute\" Patient Summary Report for complete documentation.     "

## 2018-10-01 NOTE — PROGRESS NOTES
" LIMB PRESERVATION SERVICE  HPI: Sumit Estrada is a 62 y.o. male, with a past medical history that includes uncontrolled type 2 diabetes and HTN, admitted 9/29/2018 for Sepsis (HCC).   LPS has been consulted for post op care of his amp site of the Left 4th toe.This wound started in early September of 2018 when some furniture was dropped on his left foot. Then a few days prior to admission the pt noticed severe swelling and the left fourth toe turning black.    The pt was only using ice on the wound.  IV antibiotics were started on this admission.  Infectious diseases has been consulted.    Xray has been done  and did show gas consistent with active infection  MRI has not been done  Ortho was consulted and Dr Lopez preformed the amputation  Pt was diagnosed with type 2 diabetes 20 years ago, and is currently managing with oral meds.  Pt checks their blood sugars every other day and reports that these typically run around the high 100s.  They have not had previous diabetes education.  They do have numbness in his feet.  They usually wears work shoes. They do not check their feet routinely. They have not had previous foot ulcers or foot surgery.  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     ASSESSMENT: Procedure(s):  IRRIGATION & DEBRIDEMENT ORTHO - Wound Class: Dirty or Infected with Dr. Lopez on    9/29/2018    Patient denies fevers, chills, nausea, vomiting.  Pain well controlled.   /73   Pulse 90   Temp 37.2 °C (99 °F)   Resp 18   Ht 1.74 m (5' 8.5\")   Wt 79.4 kg (175 lb)   SpO2 95%   BMI 26.22 kg/m²     Wound Characteristics           Location: Left Foot - 4th Toe Amp Initial Evaluation  Date:10/1/2018   Tissue Type and %: 100% Pink   Periwound: Large area dorsal Echymossis, Edematous, Macerated   Drainage: Small Serosanginous   Exposed structures None   Wound Edges:   Attached 100% approximated   Odor: None   S&S of Infection:   None   Edema: Localized at Site   Sensation: Insensate "                   DIABETES MANAGEMENT:  Lab Results   Component Value Date/Time    GLUCOSE 176 (H) 10/01/2018 04:28 AM      Lab Results   Component Value Date/Time    HBA1C 8.3 (H) 09/30/2018 07:46 AM      Diabetes education ordered    INFECTION MANAGEMENT:  WBC   Date/Time Value Ref Range Status   10/01/2018 04:28 AM 15.6 (H) 4.8 - 10.8 K/uL Final       Microbiology:   CULTURE WOUND W/ GRAM STAIN [[id number]]  (Abnormal) Collected:  09/29/18 1700   Order Status:  Completed Specimen:  Wound Updated:  10/01/18 1115    Significant Indicator POS (POS)    Source WND    Site Left Foot Abscess    Culture Result Wound -- (A)    Gram Stain Result Moderate WBCs.  Moderate Gram positive cocci.  Rare Gram negative rods.      Culture Result Wound Streptococcus agalactiae (Group B)  Heavy growth   (A)     Viridans Streptococcus  Moderate growth   (A)       PLAN:    Wound care:    NURSING TO CHANGE LEFT FOOT SURGICAL SITE DRESSING EVERY 48 HOURS AND PRN FOR SATURATION OR DISLODGEMENT  Nursing to cleanse wound/periwound with Normal Saline (NS).  Pat periwound dry.  Apply skin prep/No Sting to periwound.  Let air dry for 1-2 minutes. Apply SINGLE  Apply a piece of AqAg (Hydrofiber Silver), cut to size, to suture sites. Cover with Non Adhesive Foam and secure with Roll Gauze.  Then apply elastic bandage to secure dressings in place.   Please take Weekly Wound Photos. Notify wound team if wound deteriorates or fails to progress.  Nursing to change Left Foot every 48 hours and PRN for Saturation or Dislodgement  Wound Care by Nursing, LPS to Follow.    Offloading: Offloading shoe Rx for Diabetic shows / orthotics post suture removal provided with ability    Antibiotics: Per ID recommendation    Surgery: NA    DISCHARGE PLAN:    Disposition:    Follow-up: Follow up with  Dr Lopez at [facility] in 3 weeks for suture removal    Other:       Vasiliy Chow R.N.

## 2018-10-01 NOTE — CONSULTS
ADULT INFECTIOUS DISEASE CONSULT     Date of Service: 10/1/2018    Consult Requested By: David Paredes M.D.    Reason for Consultation: Diabetic foot infection    History of Present Illness:   Sumit Estrada is a 62 y.o. white male with history of diabetes mellitus, hypertension presented to the emergency room on 9/29/2018 with a wound on his left fourth toe.  He dropped an object on the toe a week ago.  He was taken to the OR by Dr. Lopez and underwent left fourth toe amputation along with I&D of multiloculated dorsal foot abscess.  The OR cultures from the foot abscess are showing group B strep and Viridans.  Blood cultures have remained negative.  In the ER he had a WBC count of 18,000.  He also had significantly uncontrolled blood sugars with hemoglobin A1c of 8.3.  He also had renal insufficiency on admission with creatinine of 1.75.  Infectious disease was called for antibiotics.    Review Of Systems:  Gen.-denies  fevers.  Had some chills before coming into the hospital  HEENT- denies any sore throat, headache or vision changes  Pulmonary- denies any cough, shortness of breath  Cardiovascular- denies any chest pain, leg swelling.    GI- denies any nausea vomiting diarrhea or abdominal pain  Musculoskeletal-planes of some pain in the left foot  Neuro- denies any weakness or sensory change  Psych- denies any depression or suicidal ideation  Genitourinary- denies any frequency or dysuria        PMH:   Past Medical History:   Diagnosis Date   • Diabetes mellitus (HCC)    • HTN (hypertension)          PSH:  Past Surgical History:   Procedure Laterality Date   • IRRIGATION & DEBRIDEMENT ORTHO Left 9/29/2018    Procedure: IRRIGATION & DEBRIDEMENT ORTHO;  Surgeon: Trip Lopez M.D.;  Location: SURGERY [facility];  Service: Orthopedics       FAMILY HX:  History reviewed. No pertinent family history.    SOCIAL HX:  Social History     Social History   • Marital status: Single     Spouse name: N/A    • Number of children: N/A   • Years of education: N/A     Occupational History   • Not on file.     Social History Main Topics   • Smoking status: Never Smoker   • Smokeless tobacco: Never Used   • Alcohol use Yes      Comment: occ   • Drug use: No   • Sexual activity: Not on file     Other Topics Concern   • Not on file     Social History Narrative   • No narrative on file     History   Smoking Status   • Never Smoker   Smokeless Tobacco   • Never Used     History   Alcohol Use   • Yes     Comment: occ       Allergies/Intolerances:  No Known Allergies    History reviewed with the patient    Other Current Medications:    Current Facility-Administered Medications:   •  glimepiride (AMARYL) tablet 4 mg, 4 mg, Oral, QAM AC, Eleonora Perry, A.P.R.N., 4 mg at 10/01/18 1324  •  metFORMIN (GLUCOPHAGE) tablet 850 mg, 850 mg, Oral, TID WITH MEALS, Eleonora Perry, A.P.R.N., 850 mg at 10/01/18 1137  •  metoprolol (LOPRESSOR) tablet 25 mg, 25 mg, Oral, TWICE DAILY, Eleonora Perry, A.P.R.N., 25 mg at 10/01/18 1137  •  insulin regular (HUMULIN R) injection 3-16 Units, 3-16 Units, Subcutaneous, 4X/DAY ACHS, 8 Units at 10/01/18 1130 **AND** Accu-Chek ACHS, , , Q AC AND BEDTIME(S) **AND** NOTIFY MD and PharmD, , , Once **AND** glucose 4 g chewable tablet 16 g, 16 g, Oral, Q15 MIN PRN **AND** dextrose 50% (D50W) injection 25 mL, 25 mL, Intravenous, Q15 MIN PRN, Eleonora Perry, A.P.R.N.  •  lovastatin (MEVACOR) tablet 40 mg, 40 mg, Oral, QAM, Eleonora Perry, A.P.R.N., 40 mg at 10/01/18 0601  •  lisinopril (PRINIVIL) tablet 20 mg, 20 mg, Oral, Q DAY, Eleonora Perry, A.P.R.N., 20 mg at 10/01/18 0601  •  hydroCHLOROthiazide (HYDRODIURIL) tablet 12.5 mg, 12.5 mg, Oral, Q DAY, Eleonora Perry A.P.R.N., 12.5 mg at 10/01/18 0601  •  senna-docusate (PERICOLACE or SENOKOT S) 8.6-50 MG per tablet 2 Tab, 2 Tab, Oral, BID, 2 Tab at 09/30/18 3482 **AND** polyethylene glycol/lytes (MIRALAX) PACKET 1 Packet, 1 Packet, Oral, QDAY PRN **AND**  "magnesium hydroxide (MILK OF MAGNESIA) suspension 30 mL, 30 mL, Oral, QDAY PRN **AND** bisacodyl (DULCOLAX) suppository 10 mg, 10 mg, Rectal, QDAY PRN, Yadiel Mercado M.D.  •  heparin injection 5,000 Units, 5,000 Units, Subcutaneous, Q8HRS, Yadiel Mercado M.D., 5,000 Units at 10/01/18 1326  •  ondansetron (ZOFRAN) syringe/vial injection 4 mg, 4 mg, Intravenous, Q4HRS PRN, Yadiel eMrcado M.D.  •  ondansetron (ZOFRAN ODT) dispertab 4 mg, 4 mg, Oral, Q4HRS PRN, Yadiel Mercado M.D.  •  promethazine (PHENERGAN) tablet 12.5-25 mg, 12.5-25 mg, Oral, Q4HRS PRN, Yadiel Mercado M.D.  •  promethazine (PHENERGAN) suppository 12.5-25 mg, 12.5-25 mg, Rectal, Q4HRS PRN, Yadiel Mercado M.D.  •  prochlorperazine (COMPAZINE) injection 5-10 mg, 5-10 mg, Intravenous, Q4HRS PRN, Yadiel Mercado M.D.  •  acetaminophen (TYLENOL) tablet 650 mg, 650 mg, Oral, Q6HRS PRN, Yadiel Mercado M.D., 650 mg at 18 1438  •  oxyCODONE immediate-release (ROXICODONE) tablet 5-10 mg, 5-10 mg, Oral, Q4HRS PRN, Yadiel Mercado M.D.  •  morphine (pf) 4 mg/ml injection 1-4 mg, 1-4 mg, Intravenous, Q2HRS PRN, Yadiel Mercado M.D.  •  ampicillin/sulbactam (UNASYN) 3 g in  mL IVPB, 3 g, Intravenous, Q6HRS, Eduardo Reid P.A.-C., Last Rate: 200 mL/hr at 10/01/18 1411, 3 g at 10/01/18 1411  [unfilled]    Most Recent Vital Signs:  /73   Pulse 90   Temp 37.2 °C (99 °F)   Resp 18   Ht 1.74 m (5' 8.5\")   Wt 79.4 kg (175 lb)   SpO2 95%   BMI 26.22 kg/m²   Temp  Av.3 °C (99.1 °F)  Min: 36 °C (96.8 °F)  Max: 38.8 °C (101.8 °F)    Physical Exam:  General: Nontoxic, no acute distress  HEENT: sclera anicteric, PERRL, EOMI, MMM, no oral lesions  Neck: supple, no lymphadenopathy  Chest: CTAB, no r/r/w, normal work of breathing.  Cardiac: Regular, no murmurs no gallops heard  Abdomen: + bowel sounds, soft, non-tender, non-distended, no HSM  Extremities: Left foot has surgical bandage  Skin: no rashes or erythema  Neuro: Alert and " oriented times 3, non-focal exam    Pertinent Lab Results:  Recent Labs      09/30/18   0747  09/30/18   1016  10/01/18   0428   WBC  18.2*  18.4*  15.6*      Recent Labs      09/30/18   0747  09/30/18   1016  10/01/18   0428   HEMOGLOBIN  12.8*  12.9*  12.9*   HEMATOCRIT  36.4*  36.3*  37.2*   MCV  88.3  90.1  87.7   MCH  31.1  32.0  30.4   PLATELETCT  226  233  248         Recent Labs      09/29/18   1228  09/30/18   0746  10/01/18   0428   SODIUM  131*  133*  134*   POTASSIUM  3.7  3.7  3.9   CHLORIDE  97  103  104   CO2  22  22  20   CREATININE  1.75*  1.02  1.09        Recent Labs      09/29/18   1228  10/01/18   0428   ALBUMIN  3.6  2.5*        Pertinent Micro:  Results     Procedure Component Value Units Date/Time    URINE CULTURE(NEW) [[id number]] Collected:  09/30/18 1440    Order Status:  Completed Specimen:  Urine from Urine, Clean Catch Updated:  10/01/18 1132     Significant Indicator NEG     Source UR     Site URINE, CLEAN CATCH     Urine Culture No growth at 24 hours.    Narrative:       Collected By:[id number] QUYEN CUNNINGHAM  Indication for culture:->Emergency Room Patient    CULTURE WOUND W/ GRAM STAIN [[id number]]  (Abnormal) Collected:  09/29/18 1700    Order Status:  Completed Specimen:  Wound Updated:  10/01/18 1115     Significant Indicator POS (POS)     Source WND     Site Left Foot Abscess     Culture Result Wound -- (A)     Gram Stain Result Moderate WBCs.  Moderate Gram positive cocci.  Rare Gram negative rods.       Culture Result Wound Streptococcus agalactiae (Group B)  Heavy growth   (A)      Viridans Streptococcus  Moderate growth   (A)    ANAEROBIC CULTURE [[id number]] Collected:  09/29/18 1700    Order Status:  Completed Specimen:  Wound Updated:  10/01/18 1115     Significant Indicator NEG     Source WND     Site Left Foot Abscess     Anaerobic Culture, Culture Res Culture in progress.    FUNGAL CULTURE [[id number]] Collected:  09/29/18 1700    Order Status:  Completed Specimen:  Wound  Updated:  10/01/18 1115     Significant Indicator NEG     Source WND     Site Left Foot Abscess     Fungal Culture Culture in progress.    AFB CULTURE [[id number]] Collected:  09/29/18 1700    Order Status:  Completed Specimen:  Wound Updated:  10/01/18 1115     Significant Indicator NEG     Source WND     Site Left Foot Abscess     AFB Culture Culture in progress.  NOTE:  Swabs are not recommended for the isolation of  Mycobacteria, since they provide limited material.  They  are acceptable only if a specimen cannot be collected by  any other means.  Negative results obtained from these  specimens submitted on swabs are not reliable.       AFB Smear Results No acid fast bacilli seen.    GRAM STAIN [[id number]] Collected:  09/29/18 1700    Order Status:  Completed Specimen:  Wound Updated:  09/30/18 1751     Significant Indicator .     Source WND     Site Left Foot Abscess     Gram Stain Result Moderate WBCs.  Moderate Gram positive cocci.  Rare Gram negative rods.      ACID FAST STAIN [[id number]] Collected:  09/29/18 1700    Order Status:  Completed Specimen:  Wound Updated:  09/30/18 1751     Significant Indicator NEG     Source WND     Site Left Foot Abscess     AFB Smear Results No acid fast bacilli seen.    URINALYSIS [[id number]]  (Abnormal) Collected:  09/30/18 1440    Order Status:  Completed Specimen:  Urine from Urine, Clean Catch Updated:  09/30/18 1454     Color DK Yellow     Character Clear     Specific Gravity 1.026     Ph 5.0     Glucose >=1000 (A) mg/dL      Ketones Trace (A) mg/dL      Protein 100 (A) mg/dL      Bilirubin Negative     Urobilinogen, Urine 1.0     Nitrite Negative     Leukocyte Esterase Negative     Occult Blood Negative     Micro Urine Req Microscopic    Narrative:       Collected By:[id number] QUYEN CUNNINGHAM  Indication for culture:->Emergency Room Patient    BLOOD CULTURE [[id number]] Collected:  09/29/18 1228    Order Status:  Completed Specimen:  Blood from Peripheral Updated:  09/30/18  "0902     Significant Indicator NEG     Source BLD     Site PERIPHERAL     Blood Culture No Growth    Note: Blood cultures are incubated for 5 days and  are monitored continuously.Positive blood cultures  are called to the RN and reported as soon as  they are identified.      Narrative:       Per Hospital Policy: Only change Specimen Src: to \"Line\" if  specified by physician order.    BLOOD CULTURE [[id number]] Collected:  09/29/18 1243    Order Status:  Completed Specimen:  Blood from Peripheral Updated:  09/30/18 0902     Significant Indicator NEG     Source BLD     Site PERIPHERAL     Blood Culture No Growth    Note: Blood cultures are incubated for 5 days and  are monitored continuously.Positive blood cultures  are called to the RN and reported as soon as  they are identified.      Narrative:       Per Hospital Policy: Only change Specimen Src: to \"Line\" if  specified by physician order.    URINALYSIS [[id number]]     Order Status:  Canceled Specimen:  Urine     URINE CULTURE(NEW) [[id number]]     Order Status:  Canceled Specimen:  Urine         Blood Culture   Date Value Ref Range Status   09/29/2018   Preliminary    No Growth    Note: Blood cultures are incubated for 5 days and  are monitored continuously.Positive blood cultures  are called to the RN and reported as soon as  they are identified.          Studies:  Dx-chest-portable (1 View)    Result Date: 9/29/2018 9/29/2018 12:55 PM HISTORY/REASON FOR EXAM:  Sepsis. Left foot infection TECHNIQUE/EXAM DESCRIPTION AND NUMBER OF VIEWS: Single portable view of the chest. COMPARISON: None FINDINGS: The lungs are clear. Cardiac silhouette: normal in size. Pleura: There are no pleural effusion or pneumothoraces. Osseous structures: No significant bony abnormality is present.     Negative single view of the chest.    Dx-foot-complete 3+ Left    Result Date: 9/29/2018 9/29/2018 1:17 PM HISTORY/REASON FOR EXAM:  Atraumatic Pain/Swelling/Deformity Left foot open wound " TECHNIQUE/EXAM DESCRIPTION AND NUMBER OF VIEWS: 3 views of the LEFT foot. COMPARISON:  None. FINDINGS: There appears to be gas associated with the forefoot. There is no fracture. There is no malalignment. Midfoot and forefoot degenerative changes are present. There is small vessel atherosclerotic plaque. There is a plantar calcaneal spur. There is dystrophic calcification the expected position of the distal tibiofibular syndesmosis consistent with prior injury.     1.  No radiographic evidence for osteomyelitis 2.  Gas associated with the lateral forefoot  IMPRESSION:     Diabetic foot infection  Left foot abscess  Uncontrolled blood sugars  Renal insufficiency      PLAN:   Sumit Estrada is a 62 y.o. white male with uncontrolled blood sugars and left diabetic foot infection.  The toe has been amputated because it was necrotic and abscess of the forefoot has been I&D.  Cultures are group B strep and strep viridans.  Continue with the Unasyn.  Control the blood sugars.  Aim for at least 2-4 weeks of intravenous antibiotics.  He will need ongoing wound care.  I have reviewed all the records.        Discussed with IM. Will continue to follow    Maria Alejandra Whitley M.D.

## 2018-10-01 NOTE — PROGRESS NOTES
Called Tila in SW to notify pt request of documentation of hospitalization for work.  Tila to bedside.

## 2018-10-01 NOTE — PROGRESS NOTES
Off loading shoe was delivered and fitted to patient LLE.  If any further assistance needed, please call extension [phone] or place order for Ortho Technician assistance as a communication order in [employer] [facility].

## 2018-10-01 NOTE — PROGRESS NOTES
POD# 2  Necrotizing foot infection  S/P toe amputation  Awaiting operative culture results  WBAT through heel  ABX per ID

## 2018-10-01 NOTE — DOCUMENTATION QUERY
DOCUMENTATION QUERY    PROVIDERS: Please select “Cosign w/ note”to reply to query.    To better represent the severity of illness of your patient, please review the following information and exercise your independent professional judgment in responding to this query.     Sepsis and acute kidney injury are documented in the History and Physical. Based upon the clinical findings, risk factors, and treatment, can the relationship between these two conditions be further specified?    • Acute kidney injury is related to sepsis  • Acute kidney injury is not related to sepsis  • Other explanation of clinical findings  • Unable to determine    The medical record reflects the following:   Clinical Findings Per H&P:   LEONEL (acute kidney injury) (HCC)- (present on admission)  Cr is 1.75  IV fluids will be given and repeat labs ordered for the morning.    Treatment IV NS bolus & continuous, monitor labs, Unasyn, Vanco, & amputation left fourth toe with I&D of foot abscess   Risk Factors Sepsis, LEONEL, necrotic left fourth toe, foot abscess, DM2, & HTN   Location within medical record History & Physical, Progress Notes, & Operative Report     Thank you,   Juana Haney RN  Clinical   Phone [phone]

## 2018-10-01 NOTE — CARE PLAN
Problem: Safety  Goal: Will remain free from injury  Outcome: PROGRESSING AS EXPECTED      Problem: Infection  Goal: Will remain free from infection  Outcome: PROGRESSING AS EXPECTED      Problem: Venous Thromboembolism (VTW)/Deep Vein Thrombosis (DVT) Prevention:  Goal: Patient will participate in Venous Thrombosis (VTE)/Deep Vein Thrombosis (DVT)Prevention Measures  Outcome: PROGRESSING AS EXPECTED

## 2018-10-01 NOTE — DIETARY
[employer] - Alert received for newly identified wound. Wound Team assessed pt - pt does not have any staged pressure ulcers per Wound Team note.  LPS has been consulted for post op care of his amp site of the left 4th toe.  RD visited pt for DM diet instruction.    RD available as needed.

## 2018-10-01 NOTE — PROGRESS NOTES
Diabetes education    Patient and Significant other seen today. Patient has long history of diabetes and takes metformin 850 mg TID and glimeperide daily. Patient does test his blood sugars at home but has not been as diligent as he should, his words,. 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 Patient recently had a toe amputation of left foot, likely related to infection. Patient and Significant other educated on proper diet for diabetes and blood sugar control. Pt was seen by dietary earlier today.    Plan    Return to patient prior to discharge to instruct patient on insulin injections and evaluate ability to perform FSBS at home if patient will be going home on insulin

## 2018-10-01 NOTE — PROGRESS NOTES
Hospital Medicine Daily Progress Note    Date of Service  10/1/2018    Chief Complaint  62 y.o. male with PMH significant for DM, HTN, and dyslipidemia admitted 9/29/2018 with redness swelling of left foot.  He dropped a piece of furniture on his left foot about 3 weeks ago.  About 1 week ago he noticed left fourth toe necrosis with associated foot swelling.    Hospital Course   He underwent left fourth toe amputation through MP joint and irrigation and debridement of multiloculated dorsal foot abscesses by Dr. Lopez on 9/29/2018.      Interval Problem Update  10/1 - POD #2  -he reports 10/10 pain left foot when up ambulating with walker  -wound culture growing Streptococcus agalactiae (Group B) Heavy growth - ID consulted  -blood sugars 170-332 - restarted home meds and adjusted sliding scale. Diabetic Educator consult pending  -added Metoprolol today for better BP control  9/30  -POD #1  -Leukocytosis improving.  Continue IV Unasyn  -LEONEL improving.  Continue IV fluids  -He reports no pain this morning  -PT eval pending    Consultants/Specialty  -Orthopedics - Dr. Lopez    Code Status  FULL    Disposition  Undetermined at this time. Awaiting ID recommendations for ABX.    Review of Systems  Review of Systems   Constitutional: Negative for chills and fever.   HENT: Negative.    Eyes: Negative.    Respiratory: Negative for cough, shortness of breath and wheezing.    Cardiovascular: Negative for chest pain and palpitations.   Gastrointestinal: Negative for abdominal pain, diarrhea, nausea and vomiting.   Genitourinary: Negative.    Musculoskeletal: Positive for joint pain. Negative for back pain and falls.   Neurological: Positive for focal weakness (left foot due to pain).   Psychiatric/Behavioral: Negative.    All other systems reviewed and are negative.       Physical Exam  Temp:  [36.6 °C (97.8 °F)-37.2 °C (99 °F)] 37.2 °C (99 °F)  Pulse:  [] 93  Resp:  [16-18] 18  BP: (117-137)/(69-83)  134/83    Physical Exam   Constitutional: He is oriented to person, place, and time. Vital signs are normal. He appears well-developed. He is cooperative.  Non-toxic appearance. He has a sickly appearance. No distress.   Pleasant gentleman sitting up in bed watching TV in no distress   HENT:   Head: Normocephalic.   Right Ear: No decreased hearing is noted.   Left Ear: No decreased hearing is noted.   Nose: Nose normal.   Mouth/Throat: Oropharynx is clear and moist and mucous membranes are normal.   Eyes: Conjunctivae, EOM and lids are normal.   Neck: Phonation normal. No JVD present.   Cardiovascular: Normal rate and normal heart sounds.  Exam reveals decreased pulses.    1+ pedal/ankle edema LLE   Pulmonary/Chest: Effort normal and breath sounds normal. He has no wheezes.   Abdominal: Soft. Normal appearance. He exhibits no distension. There is no tenderness. There is no rebound.   Musculoskeletal:   Decreased ROM left ankle/foot due to pain   Neurological: He is alert and oriented to person, place, and time. No cranial nerve deficit. GCS eye subscore is 4. GCS verbal subscore is 5. GCS motor subscore is 6.   Skin: Skin is warm and dry.   Left foot surgical dressing clean dry intact without any drainage   Psychiatric: He has a normal mood and affect. His speech is normal and behavior is normal. Judgment normal. Cognition and memory are normal.   Nursing note and vitals reviewed.      Fluids    Intake/Output Summary (Last 24 hours) at 10/01/18 1047  Last data filed at 10/01/18 0629   Gross per 24 hour   Intake                0 ml   Output             1150 ml   Net            -1150 ml       Laboratory  Recent Labs      09/30/18   0747  09/30/18   1016  10/01/18   0428   WBC  18.2*  18.4*  15.6*   RBC  4.12*  4.03*  4.24*   HEMOGLOBIN  12.8*  12.9*  12.9*   HEMATOCRIT  36.4*  36.3*  37.2*   MCV  88.3  90.1  87.7   MCH  31.1  32.0  30.4   MCHC  35.2  35.5*  34.7   RDW  39.8  40.4  39.5   PLATELETCT  226  309 030    MPV  10.5  11.5  10.7     Recent Labs      09/29/18   1228  09/30/18   0746  10/01/18   0428   SODIUM  131*  133*  134*   POTASSIUM  3.7  3.7  3.9   CHLORIDE  97  103  104   CO2  22  22  20   GLUCOSE  282*  186*  176*   BUN  42*  28*  30*   CREATININE  1.75*  1.02  1.09   CALCIUM  9.2  8.2*  8.0*                   Imaging  DX-FOOT-COMPLETE 3+ LEFT   Final Result      1.  No radiographic evidence for osteomyelitis      2.  Gas associated with the lateral forefoot      DX-CHEST-PORTABLE (1 VIEW)   Final Result      Negative single view of the chest.           Assessment/Plan  * Amputated toe of left foot (HCC)   Assessment & Plan    -4th toe  -By Dr. Lopez 9/29/2018  -10/10 pain with ambulation - 3-4/10 with rest  -analgesics  -PT/OT        Diabetic foot infection (HCC)- (present on admission)   Assessment & Plan    -Status post fourth toe amputation left foot on 9/29/2018 by orthopedic surgery  -Blood cultures negative  -Limb preservation services/Wound Care  -wound culture Streptococcus agalactiae (Group B) Heavy growth -ID consult  -Analgesic pain control  -Diabetic education  -Hemoglobin A1c 8.3        Sepsis (HCC)- (present on admission)   Assessment & Plan    -Improving after surgical intervention  -Hemodynamically stable  -Leukocytosis improving. Tmax 99  -Blood cultures negative to date. Wound culture Streptococcus agalactiae (Group B) Heavy growth   -ID consulted  -IV ABX          LEONEL (acute kidney injury) (HCC)- (present on admission)   Assessment & Plan    -resolved  -stop IVF  -follow labs        Essential hypertension- (present on admission)   Assessment & Plan    -improved on home lisinopril/hydrochlorothiazide   -added low dose beta blocker today            Dyslipidemia- (present on admission)   Assessment & Plan    -Home lovastatin        Type 2 diabetes mellitus with complication, without long-term current use of insulin (HCC)- (present on admission)   Assessment & Plan    -A1c 8.3  -LEONEL  resolved - reordered home PO meds  -SSI - adjusted today  -Diabetic diet             VTE prophylaxis: Heparin    GUS Atkins

## 2018-10-02 PROBLEM — N17.9 AKI (ACUTE KIDNEY INJURY) (HCC): Status: RESOLVED | Noted: 2018-09-29 | Resolved: 2018-10-02

## 2018-10-02 LAB
ANION GAP SERPL CALC-SCNC: 5 MMOL/L (ref 0–11.9)
BACTERIA UR CULT: NORMAL
BASOPHILS # BLD AUTO: 0.4 % (ref 0–1.8)
BASOPHILS # BLD: 0.05 K/UL (ref 0–0.12)
BUN SERPL-MCNC: 23 MG/DL (ref 8–22)
CALCIUM SERPL-MCNC: 8.4 MG/DL (ref 8.5–10.5)
CHLORIDE SERPL-SCNC: 105 MMOL/L (ref 96–112)
CO2 SERPL-SCNC: 27 MMOL/L (ref 20–33)
CREAT SERPL-MCNC: 0.88 MG/DL (ref 0.5–1.4)
EOSINOPHIL # BLD AUTO: 0.36 K/UL (ref 0–0.51)
EOSINOPHIL NFR BLD: 2.9 % (ref 0–6.9)
ERYTHROCYTE [DISTWIDTH] IN BLOOD BY AUTOMATED COUNT: 40.3 FL (ref 35.9–50)
GLUCOSE BLD-MCNC: 102 MG/DL (ref 65–99)
GLUCOSE BLD-MCNC: 124 MG/DL (ref 65–99)
GLUCOSE BLD-MCNC: 154 MG/DL (ref 65–99)
GLUCOSE SERPL-MCNC: 103 MG/DL (ref 65–99)
HCT VFR BLD AUTO: 36.7 % (ref 42–52)
HGB BLD-MCNC: 13.1 G/DL (ref 14–18)
IMM GRANULOCYTES # BLD AUTO: 0.07 K/UL (ref 0–0.11)
IMM GRANULOCYTES NFR BLD AUTO: 0.6 % (ref 0–0.9)
LYMPHOCYTES # BLD AUTO: 1.2 K/UL (ref 1–4.8)
LYMPHOCYTES NFR BLD: 9.7 % (ref 22–41)
MCH RBC QN AUTO: 32 PG (ref 27–33)
MCHC RBC AUTO-ENTMCNC: 35.7 G/DL (ref 33.7–35.3)
MCV RBC AUTO: 89.5 FL (ref 81.4–97.8)
MONOCYTES # BLD AUTO: 0.81 K/UL (ref 0–0.85)
MONOCYTES NFR BLD AUTO: 6.6 % (ref 0–13.4)
NEUTROPHILS # BLD AUTO: 9.85 K/UL (ref 1.82–7.42)
NEUTROPHILS NFR BLD: 79.8 % (ref 44–72)
NRBC # BLD AUTO: 0 K/UL
NRBC BLD-RTO: 0 /100 WBC
PLATELET # BLD AUTO: 241 K/UL (ref 164–446)
PMV BLD AUTO: 10.8 FL (ref 9–12.9)
POTASSIUM SERPL-SCNC: 3.8 MMOL/L (ref 3.6–5.5)
RBC # BLD AUTO: 4.1 M/UL (ref 4.7–6.1)
SIGNIFICANT IND 70042: NORMAL
SITE SITE: NORMAL
SODIUM SERPL-SCNC: 137 MMOL/L (ref 135–145)
SOURCE SOURCE: NORMAL
WBC # BLD AUTO: 12.3 K/UL (ref 4.8–10.8)

## 2018-10-02 PROCEDURE — 36415 COLL VENOUS BLD VENIPUNCTURE: CPT

## 2018-10-02 PROCEDURE — 99232 SBSQ HOSP IP/OBS MODERATE 35: CPT | Performed by: INTERNAL MEDICINE

## 2018-10-02 PROCEDURE — 700105 HCHG RX REV CODE 258

## 2018-10-02 PROCEDURE — A9270 NON-COVERED ITEM OR SERVICE: HCPCS | Performed by: NURSE PRACTITIONER

## 2018-10-02 PROCEDURE — 82962 GLUCOSE BLOOD TEST: CPT | Mod: 91

## 2018-10-02 PROCEDURE — 700105 HCHG RX REV CODE 258: Performed by: PHYSICIAN ASSISTANT

## 2018-10-02 PROCEDURE — 700102 HCHG RX REV CODE 250 W/ 637 OVERRIDE(OP): Performed by: NURSE PRACTITIONER

## 2018-10-02 PROCEDURE — A9270 NON-COVERED ITEM OR SERVICE: HCPCS | Performed by: HOSPITALIST

## 2018-10-02 PROCEDURE — 770006 HCHG ROOM/CARE - MED/SURG/GYN SEMI*

## 2018-10-02 PROCEDURE — 700111 HCHG RX REV CODE 636 W/ 250 OVERRIDE (IP): Performed by: PHYSICIAN ASSISTANT

## 2018-10-02 PROCEDURE — 80048 BASIC METABOLIC PNL TOTAL CA: CPT

## 2018-10-02 PROCEDURE — 85025 COMPLETE CBC W/AUTO DIFF WBC: CPT

## 2018-10-02 PROCEDURE — 700102 HCHG RX REV CODE 250 W/ 637 OVERRIDE(OP): Performed by: HOSPITALIST

## 2018-10-02 PROCEDURE — 99233 SBSQ HOSP IP/OBS HIGH 50: CPT | Performed by: INTERNAL MEDICINE

## 2018-10-02 PROCEDURE — 700111 HCHG RX REV CODE 636 W/ 250 OVERRIDE (IP): Performed by: HOSPITALIST

## 2018-10-02 RX ORDER — SULFAMETHOXAZOLE AND TRIMETHOPRIM 800; 160 MG/1; MG/1
1 TABLET ORAL EVERY 12 HOURS
Status: DISCONTINUED | OUTPATIENT
Start: 2018-10-02 | End: 2018-10-04

## 2018-10-02 RX ORDER — SODIUM CHLORIDE 9 MG/ML
INJECTION, SOLUTION INTRAVENOUS
Status: COMPLETED
Start: 2018-10-02 | End: 2018-10-02

## 2018-10-02 RX ADMIN — LISINOPRIL 20 MG: 20 TABLET ORAL at 05:13

## 2018-10-02 RX ADMIN — AMPICILLIN AND SULBACTAM 3 G: 2; 1 INJECTION, POWDER, FOR SOLUTION INTRAVENOUS at 23:37

## 2018-10-02 RX ADMIN — METFORMIN HYDROCHLORIDE 850 MG: 850 TABLET ORAL at 11:18

## 2018-10-02 RX ADMIN — AMPICILLIN AND SULBACTAM 3 G: 2; 1 INJECTION, POWDER, FOR SOLUTION INTRAVENOUS at 05:05

## 2018-10-02 RX ADMIN — HEPARIN SODIUM 5000 UNITS: 5000 INJECTION, SOLUTION INTRAVENOUS; SUBCUTANEOUS at 14:20

## 2018-10-02 RX ADMIN — STANDARDIZED SENNA CONCENTRATE AND DOCUSATE SODIUM 2 TABLET: 8.6; 5 TABLET ORAL at 05:11

## 2018-10-02 RX ADMIN — SODIUM CHLORIDE 500 ML: 9 INJECTION, SOLUTION INTRAVENOUS at 11:20

## 2018-10-02 RX ADMIN — LOVASTATIN 40 MG: 20 TABLET ORAL at 05:11

## 2018-10-02 RX ADMIN — METFORMIN HYDROCHLORIDE 850 MG: 850 TABLET ORAL at 05:42

## 2018-10-02 RX ADMIN — METOPROLOL TARTRATE 25 MG: 25 TABLET, FILM COATED ORAL at 05:17

## 2018-10-02 RX ADMIN — METFORMIN HYDROCHLORIDE 850 MG: 850 TABLET ORAL at 17:08

## 2018-10-02 RX ADMIN — HEPARIN SODIUM 5000 UNITS: 5000 INJECTION, SOLUTION INTRAVENOUS; SUBCUTANEOUS at 05:08

## 2018-10-02 RX ADMIN — AMPICILLIN AND SULBACTAM 3 G: 2; 1 INJECTION, POWDER, FOR SOLUTION INTRAVENOUS at 11:18

## 2018-10-02 RX ADMIN — HYDROCHLOROTHIAZIDE 12.5 MG: 12.5 TABLET ORAL at 05:13

## 2018-10-02 RX ADMIN — SULFAMETHOXAZOLE AND TRIMETHOPRIM 1 TABLET: 800; 160 TABLET ORAL at 17:08

## 2018-10-02 RX ADMIN — GLIMEPIRIDE 4 MG: 4 TABLET ORAL at 05:42

## 2018-10-02 RX ADMIN — HEPARIN SODIUM 5000 UNITS: 5000 INJECTION, SOLUTION INTRAVENOUS; SUBCUTANEOUS at 23:37

## 2018-10-02 RX ADMIN — AMPICILLIN AND SULBACTAM 3 G: 2; 1 INJECTION, POWDER, FOR SOLUTION INTRAVENOUS at 17:24

## 2018-10-02 RX ADMIN — METOPROLOL TARTRATE 25 MG: 25 TABLET, FILM COATED ORAL at 17:08

## 2018-10-02 ASSESSMENT — ENCOUNTER SYMPTOMS
SORE THROAT: 0
FALLS: 0
CHILLS: 0
CONSTIPATION: 0
WHEEZING: 0
NAUSEA: 0
PSYCHIATRIC NEGATIVE: 1
VOMITING: 0
EYES NEGATIVE: 1
PALPITATIONS: 0
FEVER: 0
BACK PAIN: 0
COUGH: 0
FOCAL WEAKNESS: 1
ABDOMINAL PAIN: 0
SHORTNESS OF BREATH: 0
HEADACHES: 0
DIARRHEA: 0

## 2018-10-02 ASSESSMENT — PATIENT HEALTH QUESTIONNAIRE - PHQ9
SUM OF ALL RESPONSES TO PHQ9 QUESTIONS 1 AND 2: 0
1. LITTLE INTEREST OR PLEASURE IN DOING THINGS: NOT AT ALL
2. FEELING DOWN, DEPRESSED, IRRITABLE, OR HOPELESS: NOT AT ALL

## 2018-10-02 ASSESSMENT — PAIN SCALES - GENERAL
PAINLEVEL_OUTOF10: 1
PAINLEVEL_OUTOF10: 2
PAINLEVEL_OUTOF10: 2
PAINLEVEL_OUTOF10: 0

## 2018-10-02 NOTE — PROGRESS NOTES
Called wound care ([phone]) for orders for dressing change. Left message.    Rec'd callback. Dressing orders placed.

## 2018-10-02 NOTE — CARE PLAN
Problem: Infection  Goal: Will remain free from infection  Outcome: PROGRESSING AS EXPECTED  Admitted with infection. Currently, labs monitored show decreasing WBCs, pt on Abx, standard precautions in place, followed by ID.    Problem: Bowel/Gastric:  Goal: Normal bowel function is maintained or improved  Outcome: PROGRESSING AS EXPECTED  BM yesterday. Bowel protocol in place although pt refuses stool softeners (also refuses opioids). Encourage PO intake food and fluids, mobility.    Problem: Discharge Barriers/Planning  Goal: Patient's continuum of care needs will be met  Outcome: PROGRESSING AS EXPECTED  Seen by wound, ID, hospitalist.

## 2018-10-02 NOTE — CARE PLAN
Problem: Safety  Goal: Will remain free from injury    Intervention: Educate patient and significant other/support system about adaptive mobility strategies and safe transfers  Pt was educated to please use call button for assistance.       Problem: Pain Management  Goal: Pain level will decrease to patient's comfort goal    Intervention: Follow pain managment plan developed in collaboration with patient and Interdisciplinary Team  Pt is using tylenol 650 mg and it is adequately controlling his pain.

## 2018-10-02 NOTE — PROGRESS NOTES
" LIMB PRESERVATION SERVICE  HPI: Sumit Nogueira is a 62 y.o. male, with a past medical history that includes uncontrolled type 2 diabetes and HTN, admitted 9/29/2018 for a left foot infection.  Dr. Lopez was consulted, and took pt to surgery on the day of admission-for a left foot I&D and L 4th toe amputation. LPS was  consulted for post op care of his amp site of the Left 4th toe. Per pt, his foot wound started in early September of 2018 when some furniture was dropped on his foot. Afew days prior to admission, he noticed severe swelling and the left fourth toe turning black.      IV antibiotics were started on this admission.  Infectious diseases has been consulted.    Xray has been done  and did show gas consistent with active infection  MRI was not been done    SURGERY DATE: 10/2/2018  PROCEDURE: Procedure(s):  Left foot I&D and L 4th toe amputation by Dr. Lopez    Patient denies fevers, chills, nausea, vomiting.  Pain well controlled.   /86   Pulse 91   Temp 36.7 °C (98.1 °F)   Resp 18   Ht 1.74 m (5' 8.5\")   Wt 79.4 kg (175 lb)   SpO2 95%   BMI 26.22 kg/m²     SURGICAL SITE ASSESSMENT:  Incision well approximated, sutures intact.  Erythema- minimal Edema- local,  Drainage-scant ss.  Pedal Pulses-palpable,  Foot warm    DIABETES MANAGEMENT:  Blood glucose: 102  A1c: 8.3 (date 9/30/2018)  Diabetes education - completed    INFECTION MANAGEMENT:  WBC: Results for SUMIT NOGUEIRA (MRN [id number]) as of 10/2/2018 19:07   Ref. Range 10/2/2018 16:20   WBC Latest Ref Range: 4.8 - 10.8 K/uL 12.3 (H)     Wound culture results:   Culture Result Wound  (Abnormal)      Streptococcus agalactiae (Group B)   Heavy growth     Culture Result Wound  (Abnormal)      Viridans Streptococcus   Moderate growth     Culture Result Wound  (Abnormal)      Coagulase-negative Staphylococcus species   Light growth        Antibiotics: Per ID recommendation    MOBILITY  Weight Bearing Status: Heel weight " bearing  PT Consult: Yes  Offloading: Offloading boot  when ambulating    PLAN:    Wound care: Incisional dressing - Change POD #2 (Directions: Adaptic over incision, Gauze, Roll Gauze, Ace Wrap)    Offloading: Offloading boot  Offloading shoe    Antibiotics: Per ID recommendation    Plan to return to [facility].: No    DISCHARGE PLAN:    Disposition: Anticipate dc to home    Follow-up: LPS rounds in [facility]          SYLVIA GriffithN.

## 2018-10-02 NOTE — PROGRESS NOTES
POD# 3  Necrotizing foot infection  S/P toe amputation  WBC trending down  Group B strep and strep agalactiae  WBAT through heel  ABX per ID

## 2018-10-02 NOTE — PROGRESS NOTES
Hospital Medicine Daily Progress Note    Date of Service  10/2/2018    Chief Complaint  62 y.o. male with PMH significant for DM, HTN, and dyslipidemia admitted 9/29/2018 with redness swelling of left foot.  He dropped a piece of furniture on his left foot about 3 weeks ago.  About 1 week ago he noticed left fourth toe necrosis with associated foot swelling.     Hospital Course   He underwent left fourth toe amputation through MP joint and irrigation and debridement of multiloculated dorsal foot abscesses by Dr. Lopez on 9/29/2018. Wound culture (+) for Strep agalactiae (Group B).    Interval Problem Update  10/2 - POD #3 - ongoing 10/10 pain with ambulation. 3-4/10 at rest  -hemodynamically stable  -BM yesterday  -dressing changed yesterday  -plan for 2-4 weeks IV ABX per ID recommendations  -LEONEL resolved. DC IVF  -better blood glucose control after restarting home meds     10/1 - POD #2  -he reports 10/10 pain left foot when up ambulating with walker  -wound culture growing Streptococcus agalactiae (Group B) Heavy growth - ID consulted  -blood sugars 170-332 - restarted home meds and adjusted sliding scale. Diabetic Educator consult pending  -added Metoprolol today for better BP control    Consultants/Specialty  -Orthopedics - Dr. Lopez    Code Status  FULL    Disposition  TBD    Review of Systems  Review of Systems   Constitutional: Negative for chills and fever.   HENT: Negative.    Eyes: Negative.    Respiratory: Negative for cough, shortness of breath and wheezing.    Cardiovascular: Negative for chest pain and palpitations.   Gastrointestinal: Negative for abdominal pain, constipation, diarrhea, nausea and vomiting.   Genitourinary: Negative.    Musculoskeletal: Positive for joint pain. Negative for back pain and falls.   Neurological: Positive for focal weakness (left foot due to pain).   Psychiatric/Behavioral: Negative.    All other systems reviewed and are negative.       Physical Exam  Temp:   [36.7 °C (98 °F)-37.6 °C (99.7 °F)] 36.7 °C (98.1 °F)  Pulse:  [85-91] 91  Resp:  [16-18] 18  BP: (120-154)/(66-86) 136/86    Physical Exam   Constitutional: He is oriented to person, place, and time. Vital signs are normal. He appears well-developed. He is cooperative.  Non-toxic appearance. He has a sickly appearance. No distress.   Sitting up in bed watching TV in no distress   HENT:   Head: Normocephalic.   Right Ear: Hearing normal.   Left Ear: Hearing normal.   Nose: Nose normal.   Mouth/Throat: Oropharynx is clear and moist and mucous membranes are normal.   Eyes: Conjunctivae, EOM and lids are normal. No scleral icterus.   Neck: Phonation normal. No JVD present.   Cardiovascular: Normal rate and normal heart sounds.  Exam reveals decreased pulses.    1+ pedal/ankle edema LLE   Pulmonary/Chest: Effort normal and breath sounds normal. He has no wheezes. He has no rhonchi.   Abdominal: Soft. Normal appearance. He exhibits no distension. There is no tenderness. There is no rebound.   Musculoskeletal:   Decreased ROM left ankle/foot due to pain   Neurological: He is alert and oriented to person, place, and time. No cranial nerve deficit. GCS eye subscore is 4. GCS verbal subscore is 5. GCS motor subscore is 6.   Moves all extremities   Skin: Skin is warm and dry.   Left foot surgical dressing clean dry intact without any drainage   Psychiatric: He has a normal mood and affect. His speech is normal and behavior is normal. Judgment normal. Cognition and memory are normal.   Nursing note and vitals reviewed.      Fluids    Intake/Output Summary (Last 24 hours) at 10/02/18 1255  Last data filed at 10/02/18 0503   Gross per 24 hour   Intake              750 ml   Output              700 ml   Net               50 ml       Laboratory  Recent Labs      09/30/18   0747  09/30/18   1016  10/01/18   0428   WBC  18.2*  18.4*  15.6*   RBC  4.12*  4.03*  4.24*   HEMOGLOBIN  12.8*  12.9*  12.9*   HEMATOCRIT  36.4*  36.3*  37.2*    MCV  88.3  90.1  87.7   MCH  31.1  32.0  30.4   MCHC  35.2  35.5*  34.7   RDW  39.8  40.4  39.5   PLATELETCT  226  233  248   MPV  10.5  11.5  10.7     Recent Labs      09/30/18   0746  10/01/18   0428   SODIUM  133*  134*   POTASSIUM  3.7  3.9   CHLORIDE  103  104   CO2  22  20   GLUCOSE  186*  176*   BUN  28*  30*   CREATININE  1.02  1.09   CALCIUM  8.2*  8.0*                   Imaging  DX-FOOT-COMPLETE 3+ LEFT   Final Result      1.  No radiographic evidence for osteomyelitis      2.  Gas associated with the lateral forefoot      DX-CHEST-PORTABLE (1 VIEW)   Final Result      Negative single view of the chest.           Assessment/Plan  * Amputated toe of left foot (HCC)   Assessment & Plan    -4th toe by Dr. Lopez 9/29/2018  -10/10 pain with ambulation - 3-4/10 with rest  -analgesics  -PT/OT        Diabetic foot infection (HCC)- (present on admission)   Assessment & Plan    -Status post fourth toe amputation left foot on 9/29/2018 by orthopedic surgery  -Blood cultures negative  -Limb preservation services/Wound Care  -wound culture Streptococcus agalactiae (Group B) Heavy growth -ID consult  -Analgesic pain control  -Diabetic education  -Hemoglobin A1c 8.3        Sepsis (HCC)- (present on admission)   Assessment & Plan    -without end order dysfunction  -Improving after surgical intervention  -Hemodynamically stable  -Leukocytosis improving.   -afebrile  -Blood cultures negative to date. Wound culture Streptococcus agalactiae (Group B) Heavy growth   -ID   -IV ABX          Essential hypertension- (present on admission)   Assessment & Plan    -stable on HCTZ, Lisinopril and Metoprolol                Dyslipidemia- (present on admission)   Assessment & Plan    -Home lovastatin        Type 2 diabetes mellitus with complication, without long-term current use of insulin (HCC)- (present on admission)   Assessment & Plan    -A1c 8.3  -LEONEL resolved - reordered home PO meds  -better control past 24 hours  -SSI    -Diabetic diet             VTE prophylaxis: Heparin    GUS Atkins

## 2018-10-02 NOTE — PROGRESS NOTES
Infectious Disease Progress Note    Author: GUS Castro Date & Time of service: 10/2/2018  2:47 PM    Chief Complaint:  Left DM foot infection  Left foot abscess    Interval History:  62 y.o. Male with Left foot DM foot infection with abscess s/p 4th toe amputation and I&D.  10/2- Tm 99.7, no WBC, 7/10 L foot pain- improved with rest and pain meds, no issue with abx.  Labs Reviewed, Medications Reviewed, Radiology Reviewed and Wound Reviewed.    Review of Systems:  Review of Systems   Constitutional: Negative for chills and fever.   HENT: Negative for sore throat.    Respiratory: Negative for shortness of breath.    Cardiovascular: Positive for leg swelling. Negative for chest pain.   Gastrointestinal: Negative for abdominal pain, diarrhea, nausea and vomiting.   Genitourinary: Negative for dysuria.   Musculoskeletal: Positive for joint pain.   Skin: Negative for rash.   Neurological: Negative for headaches.       Hemodynamics:  Temp (24hrs), Av.1 °C (98.7 °F), Min:36.7 °C (98 °F), Max:37.6 °C (99.7 °F)  Temperature: 36.7 °C (98.1 °F)  Pulse  Av.3  Min: 80  Max: 121   Blood Pressure: 136/86       Physical Exam:  Physical Exam   Constitutional: He is oriented to person, place, and time. He appears well-developed and well-nourished. No distress.   HENT:   Head: Normocephalic and atraumatic.   Eyes: Pupils are equal, round, and reactive to light. Conjunctivae and EOM are normal.   Neck: Normal range of motion. Neck supple. No tracheal deviation present.   Cardiovascular: Normal rate, regular rhythm and normal heart sounds.    No murmur heard.  Faint distal LLE pulse.   Pulmonary/Chest: Effort normal and breath sounds normal. No respiratory distress. He has no wheezes.   Abdominal: Soft. Bowel sounds are normal. He exhibits no distension. There is no tenderness.   Musculoskeletal: He exhibits edema and tenderness.   Left 4th toe amp site- sutures in place, mild ss drainage, no odor, no  maceration, minimal erythema along incision, mild erythema along lateral edge of foot that is warm to touch and slightly tender.    Neurological: He is alert and oriented to person, place, and time.   Skin: Skin is warm. There is erythema.   Psychiatric: He has a normal mood and affect.   Nursing note and vitals reviewed.      Meds:    Current Facility-Administered Medications:   •  glimepiride  •  metFORMIN  •  metoprolol  •  insulin regular **AND** Accu-Chek ACHS **AND** NOTIFY MD and PharmD **AND** glucose 4 g **AND** dextrose 50%  •  lovastatin  •  lisinopril  •  hydroCHLOROthiazide  •  senna-docusate **AND** polyethylene glycol/lytes **AND** magnesium hydroxide **AND** bisacodyl  •  heparin  •  ondansetron  •  ondansetron  •  promethazine  •  promethazine  •  prochlorperazine  •  acetaminophen  •  oxyCODONE immediate-release  •  ampicillin-sulbactam (UNASYN) IV    Labs:  Recent Labs      09/30/18   0747  09/30/18   1016  10/01/18   0428   WBC  18.2*  18.4*  15.6*   RBC  4.12*  4.03*  4.24*   HEMOGLOBIN  12.8*  12.9*  12.9*   HEMATOCRIT  36.4*  36.3*  37.2*   MCV  88.3  90.1  87.7   MCH  31.1  32.0  30.4   RDW  39.8  40.4  39.5   PLATELETCT  226  233  248   MPV  10.5  11.5  10.7   NEUTSPOLYS  84.20*  84.40*  82.30*   LYMPHOCYTES  6.70*  6.70*  8.50*   MONOCYTES  7.90  8.00  7.30   EOSINOPHILS  0.10  0.10  1.00   BASOPHILS  0.40  0.30  0.30     Recent Labs      09/30/18   0746  10/01/18   0428   SODIUM  133*  134*   POTASSIUM  3.7  3.9   CHLORIDE  103  104   CO2  22  20   GLUCOSE  186*  176*   BUN  28*  30*     Recent Labs      09/30/18   0746  10/01/18   0428   ALBUMIN   --   2.5*   TBILIRUBIN   --   0.7   ALKPHOSPHAT   --   65   TOTPROTEIN   --   5.7*   ALTSGPT   --   11   ASTSGOT   --   13   CREATININE  1.02  1.09       Imaging:  No recent imaging.      Micro:  Results     Procedure Component Value Units Date/Time    CULTURE WOUND W/ GRAM STAIN [[id number]]  (Abnormal) Collected:  09/29/18 1700    Order Status:   Completed Specimen:  Wound Updated:  10/02/18 1153     Significant Indicator POS (POS)     Source WND     Site Left Foot Abscess     Culture Result Wound -- (A)     Gram Stain Result Moderate WBCs.  Moderate Gram positive cocci.  Rare Gram negative rods.       Culture Result Wound Streptococcus agalactiae (Group B)  Heavy growth   (A)      Viridans Streptococcus  Moderate growth   (A)      Coagulase-negative Staphylococcus species  Light growth   (A)    ANAEROBIC CULTURE [[id number]] Collected:  09/29/18 1700    Order Status:  Completed Specimen:  Wound Updated:  10/02/18 1153     Significant Indicator NEG     Source WND     Site Left Foot Abscess     Anaerobic Culture, Culture Res Light growth possible anaerobes;  further incubation required.      FUNGAL CULTURE [[id number]] Collected:  09/29/18 1700    Order Status:  Completed Specimen:  Wound Updated:  10/02/18 1153     Significant Indicator NEG     Source WND     Site Left Foot Abscess     Fungal Culture Culture in progress.    AFB CULTURE [[id number]] Collected:  09/29/18 1700    Order Status:  Completed Specimen:  Wound Updated:  10/02/18 1153     Significant Indicator NEG     Source WND     Site Left Foot Abscess     AFB Culture Culture in progress.  NOTE:  Swabs are not recommended for the isolation of  Mycobacteria, since they provide limited material.  They  are acceptable only if a specimen cannot be collected by  any other means.  Negative results obtained from these  specimens submitted on swabs are not reliable.       AFB Smear Results No acid fast bacilli seen.    URINE CULTURE(NEW) [[id number]] Collected:  09/30/18 1440    Order Status:  Completed Specimen:  Urine from Urine, Clean Catch Updated:  10/02/18 0931     Significant Indicator NEG     Source UR     Site URINE, CLEAN CATCH     Urine Culture No growth at 48 hours.    Narrative:       Collected By:[id number] QUYEN CUNNINGHAM  Indication for culture:->Emergency Room Patient    GRAM STAIN [[id number]]  "Collected:  09/29/18 1700    Order Status:  Completed Specimen:  Wound Updated:  09/30/18 1751     Significant Indicator .     Source WND     Site Left Foot Abscess     Gram Stain Result Moderate WBCs.  Moderate Gram positive cocci.  Rare Gram negative rods.      ACID FAST STAIN [[id number]] Collected:  09/29/18 1700    Order Status:  Completed Specimen:  Wound Updated:  09/30/18 1751     Significant Indicator NEG     Source WND     Site Left Foot Abscess     AFB Smear Results No acid fast bacilli seen.    URINALYSIS [[id number]]  (Abnormal) Collected:  09/30/18 1440    Order Status:  Completed Specimen:  Urine from Urine, Clean Catch Updated:  09/30/18 1454     Color DK Yellow     Character Clear     Specific Gravity 1.026     Ph 5.0     Glucose >=1000 (A) mg/dL      Ketones Trace (A) mg/dL      Protein 100 (A) mg/dL      Bilirubin Negative     Urobilinogen, Urine 1.0     Nitrite Negative     Leukocyte Esterase Negative     Occult Blood Negative     Micro Urine Req Microscopic    Narrative:       Collected By:[id number] QUYEN CUNNINGHAM  Indication for culture:->Emergency Room Patient    BLOOD CULTURE [[id number]] Collected:  09/29/18 1228    Order Status:  Completed Specimen:  Blood from Peripheral Updated:  09/30/18 0902     Significant Indicator NEG     Source BLD     Site PERIPHERAL     Blood Culture No Growth    Note: Blood cultures are incubated for 5 days and  are monitored continuously.Positive blood cultures  are called to the RN and reported as soon as  they are identified.      Narrative:       Per Hospital Policy: Only change Specimen Src: to \"Line\" if  specified by physician order.    BLOOD CULTURE [[id number]] Collected:  09/29/18 1243    Order Status:  Completed Specimen:  Blood from Peripheral Updated:  09/30/18 0902     Significant Indicator NEG     Source BLD     Site PERIPHERAL     Blood Culture No Growth    Note: Blood cultures are incubated for 5 days and  are monitored continuously.Positive blood " "cultures  are called to the RN and reported as soon as  they are identified.      Narrative:       Per Hospital Policy: Only change Specimen Src: to \"Line\" if  specified by physician order.    URINALYSIS [[id number]]     Order Status:  Canceled Specimen:  Urine     URINE CULTURE(NEW) [[id number]]     Order Status:  Canceled Specimen:  Urine           Assessment:  Active Hospital Problems    Diagnosis   • *Amputated toe of left foot ([facility]) [Z89.422]   • Diabetic foot infection ([facility]) [E11.628, L08.9]   • Type 2 diabetes mellitus with complication, without long-term current use of insulin ([facility]) [E11.8]   • Sepsis ([facility]) [A41.9]   Left foot abscess    Plan:  Left DM foot infection with abscess  Tm 99.7  Leukocytosis  CBC ordered for tomorrow  Bcxs on 9/29 NGTD  S/p L 4th toe amputation and I&D of multiloculated abscess on 9/29 with Dr. Lopez  OR cx +GBS, Strep Viridans and CoNS  Being followed by LPS  Continue IV Unasyn  Start PO Bactrim  Anticipate 2-4 weeks abx    Uncontrolled diabetes  Hemoglobin A1c 8.3% on 9/30/18  Keep blood sugar <150 control infection and promote wound healing    Renal insufficiency-improving  Renally adjust antibiotics as needed  Avoid nephrotoxins  CMP ordered for tomorrow    Prognosis of limb salvage guarded      Discussed with ART Adorno.  Does not have insurance, working to get emergency assistance.  May have to complete treatment inpatient.  "

## 2018-10-03 LAB
ALBUMIN SERPL BCP-MCNC: 2.5 G/DL (ref 3.2–4.9)
ALBUMIN/GLOB SERPL: 0.7 G/DL
ALP SERPL-CCNC: 70 U/L (ref 30–99)
ALT SERPL-CCNC: 17 U/L (ref 2–50)
ANION GAP SERPL CALC-SCNC: 9 MMOL/L (ref 0–11.9)
AST SERPL-CCNC: 21 U/L (ref 12–45)
BASOPHILS # BLD AUTO: 0.4 % (ref 0–1.8)
BASOPHILS # BLD: 0.04 K/UL (ref 0–0.12)
BILIRUB SERPL-MCNC: 0.5 MG/DL (ref 0.1–1.5)
BUN SERPL-MCNC: 17 MG/DL (ref 8–22)
CALCIUM SERPL-MCNC: 8.5 MG/DL (ref 8.5–10.5)
CHLORIDE SERPL-SCNC: 105 MMOL/L (ref 96–112)
CO2 SERPL-SCNC: 23 MMOL/L (ref 20–33)
CREAT SERPL-MCNC: 0.85 MG/DL (ref 0.5–1.4)
EOSINOPHIL # BLD AUTO: 0.32 K/UL (ref 0–0.51)
EOSINOPHIL NFR BLD: 3 % (ref 0–6.9)
ERYTHROCYTE [DISTWIDTH] IN BLOOD BY AUTOMATED COUNT: 39.9 FL (ref 35.9–50)
GLOBULIN SER CALC-MCNC: 3.7 G/DL (ref 1.9–3.5)
GLUCOSE BLD-MCNC: 119 MG/DL (ref 65–99)
GLUCOSE BLD-MCNC: 192 MG/DL (ref 65–99)
GLUCOSE SERPL-MCNC: 115 MG/DL (ref 65–99)
HCT VFR BLD AUTO: 36.8 % (ref 42–52)
HGB BLD-MCNC: 12.7 G/DL (ref 14–18)
IMM GRANULOCYTES # BLD AUTO: 0.07 K/UL (ref 0–0.11)
IMM GRANULOCYTES NFR BLD AUTO: 0.7 % (ref 0–0.9)
LYMPHOCYTES # BLD AUTO: 1.32 K/UL (ref 1–4.8)
LYMPHOCYTES NFR BLD: 12.5 % (ref 22–41)
MCH RBC QN AUTO: 30.8 PG (ref 27–33)
MCHC RBC AUTO-ENTMCNC: 34.5 G/DL (ref 33.7–35.3)
MCV RBC AUTO: 89.3 FL (ref 81.4–97.8)
MONOCYTES # BLD AUTO: 0.71 K/UL (ref 0–0.85)
MONOCYTES NFR BLD AUTO: 6.7 % (ref 0–13.4)
NEUTROPHILS # BLD AUTO: 8.09 K/UL (ref 1.82–7.42)
NEUTROPHILS NFR BLD: 76.7 % (ref 44–72)
NRBC # BLD AUTO: 0 K/UL
NRBC BLD-RTO: 0 /100 WBC
PLATELET # BLD AUTO: 263 K/UL (ref 164–446)
PMV BLD AUTO: 10.5 FL (ref 9–12.9)
POTASSIUM SERPL-SCNC: 3.7 MMOL/L (ref 3.6–5.5)
PROT SERPL-MCNC: 6.2 G/DL (ref 6–8.2)
RBC # BLD AUTO: 4.12 M/UL (ref 4.7–6.1)
SODIUM SERPL-SCNC: 137 MMOL/L (ref 135–145)
WBC # BLD AUTO: 10.6 K/UL (ref 4.8–10.8)

## 2018-10-03 PROCEDURE — 770006 HCHG ROOM/CARE - MED/SURG/GYN SEMI*

## 2018-10-03 PROCEDURE — 700111 HCHG RX REV CODE 636 W/ 250 OVERRIDE (IP): Performed by: HOSPITALIST

## 2018-10-03 PROCEDURE — 99232 SBSQ HOSP IP/OBS MODERATE 35: CPT | Performed by: INTERNAL MEDICINE

## 2018-10-03 PROCEDURE — 700111 HCHG RX REV CODE 636 W/ 250 OVERRIDE (IP): Performed by: PHYSICIAN ASSISTANT

## 2018-10-03 PROCEDURE — 700102 HCHG RX REV CODE 250 W/ 637 OVERRIDE(OP): Performed by: NURSE PRACTITIONER

## 2018-10-03 PROCEDURE — 700105 HCHG RX REV CODE 258: Performed by: PHYSICIAN ASSISTANT

## 2018-10-03 PROCEDURE — A9270 NON-COVERED ITEM OR SERVICE: HCPCS | Performed by: NURSE PRACTITIONER

## 2018-10-03 PROCEDURE — 97535 SELF CARE MNGMENT TRAINING: CPT

## 2018-10-03 PROCEDURE — 80053 COMPREHEN METABOLIC PANEL: CPT

## 2018-10-03 PROCEDURE — 82962 GLUCOSE BLOOD TEST: CPT

## 2018-10-03 PROCEDURE — 97116 GAIT TRAINING THERAPY: CPT

## 2018-10-03 PROCEDURE — 36415 COLL VENOUS BLD VENIPUNCTURE: CPT

## 2018-10-03 PROCEDURE — 85025 COMPLETE CBC W/AUTO DIFF WBC: CPT

## 2018-10-03 RX ORDER — DEXTROSE MONOHYDRATE 25 G/50ML
25 INJECTION, SOLUTION INTRAVENOUS
Status: DISCONTINUED | OUTPATIENT
Start: 2018-10-03 | End: 2018-10-16 | Stop reason: HOSPADM

## 2018-10-03 RX ORDER — LISINOPRIL 10 MG/1
30 TABLET ORAL
Status: DISCONTINUED | OUTPATIENT
Start: 2018-10-04 | End: 2018-10-10

## 2018-10-03 RX ORDER — ASPIRIN 325 MG
325 TABLET ORAL
Status: DISCONTINUED | OUTPATIENT
Start: 2018-10-03 | End: 2018-10-16 | Stop reason: HOSPADM

## 2018-10-03 RX ADMIN — METFORMIN HYDROCHLORIDE 850 MG: 850 TABLET ORAL at 11:37

## 2018-10-03 RX ADMIN — METOPROLOL TARTRATE 25 MG: 25 TABLET, FILM COATED ORAL at 17:04

## 2018-10-03 RX ADMIN — AMPICILLIN AND SULBACTAM 3 G: 2; 1 INJECTION, POWDER, FOR SOLUTION INTRAVENOUS at 17:04

## 2018-10-03 RX ADMIN — SULFAMETHOXAZOLE AND TRIMETHOPRIM 1 TABLET: 800; 160 TABLET ORAL at 17:04

## 2018-10-03 RX ADMIN — HEPARIN SODIUM 5000 UNITS: 5000 INJECTION, SOLUTION INTRAVENOUS; SUBCUTANEOUS at 20:55

## 2018-10-03 RX ADMIN — SULFAMETHOXAZOLE AND TRIMETHOPRIM 1 TABLET: 800; 160 TABLET ORAL at 06:21

## 2018-10-03 RX ADMIN — METFORMIN HYDROCHLORIDE 850 MG: 850 TABLET ORAL at 17:04

## 2018-10-03 RX ADMIN — ASPIRIN 325 MG: 325 TABLET, COATED ORAL at 20:53

## 2018-10-03 RX ADMIN — LOVASTATIN 40 MG: 20 TABLET ORAL at 06:21

## 2018-10-03 RX ADMIN — HEPARIN SODIUM 5000 UNITS: 5000 INJECTION, SOLUTION INTRAVENOUS; SUBCUTANEOUS at 06:22

## 2018-10-03 RX ADMIN — GLIMEPIRIDE 4 MG: 4 TABLET ORAL at 06:22

## 2018-10-03 RX ADMIN — METOPROLOL TARTRATE 25 MG: 25 TABLET, FILM COATED ORAL at 06:21

## 2018-10-03 RX ADMIN — HYDROCHLOROTHIAZIDE 12.5 MG: 12.5 TABLET ORAL at 06:21

## 2018-10-03 RX ADMIN — AMPICILLIN AND SULBACTAM 3 G: 2; 1 INJECTION, POWDER, FOR SOLUTION INTRAVENOUS at 11:37

## 2018-10-03 RX ADMIN — AMPICILLIN AND SULBACTAM 3 G: 2; 1 INJECTION, POWDER, FOR SOLUTION INTRAVENOUS at 06:21

## 2018-10-03 RX ADMIN — METFORMIN HYDROCHLORIDE 850 MG: 850 TABLET ORAL at 06:21

## 2018-10-03 RX ADMIN — AMPICILLIN AND SULBACTAM 3 G: 2; 1 INJECTION, POWDER, FOR SOLUTION INTRAVENOUS at 23:36

## 2018-10-03 RX ADMIN — LISINOPRIL 20 MG: 20 TABLET ORAL at 06:21

## 2018-10-03 RX ADMIN — HEPARIN SODIUM 5000 UNITS: 5000 INJECTION, SOLUTION INTRAVENOUS; SUBCUTANEOUS at 14:27

## 2018-10-03 ASSESSMENT — COGNITIVE AND FUNCTIONAL STATUS - GENERAL
WALKING IN HOSPITAL ROOM: A LITTLE
SUGGESTED CMS G CODE MODIFIER MOBILITY: CJ
MOBILITY SCORE: 22
CLIMB 3 TO 5 STEPS WITH RAILING: A LITTLE

## 2018-10-03 ASSESSMENT — ENCOUNTER SYMPTOMS
FEVER: 0
DIARRHEA: 0
ABDOMINAL PAIN: 0
NAUSEA: 0
BACK PAIN: 0
EYES NEGATIVE: 1
PALPITATIONS: 0
DIAPHORESIS: 0
CHILLS: 0
FALLS: 0
FOCAL WEAKNESS: 1
WHEEZING: 0
PSYCHIATRIC NEGATIVE: 1
CONSTIPATION: 0
COUGH: 0
SENSORY CHANGE: 1
VOMITING: 0
SHORTNESS OF BREATH: 0
HEADACHES: 0

## 2018-10-03 ASSESSMENT — GAIT ASSESSMENTS
DISTANCE (FEET): 100
ASSISTIVE DEVICE: FRONT WHEEL WALKER
DEVIATION: ANTALGIC;DECREASED BASE OF SUPPORT;DECREASED TOE OFF
GAIT LEVEL OF ASSIST: STAND BY ASSIST

## 2018-10-03 NOTE — PROGRESS NOTES
Infectious Disease Progress Note    Author: GUS Castro Date & Time of service: 10/3/2018  4:13 PM    Chief Complaint:  Left DM foot infection  Left foot abscess    Interval History:  62 y.o. Male with Left foot DM foot infection with abscess s/p 4th toe amputation and I&D.  10/2- Tm 99.7, no WBC, 7/10 L foot pain- improved with rest and pain meds, no issue with abx.  10/3- AF, WBC 10.6, Left foot sore- better than yesterday, tolerating abx.  Labs Reviewed, Medications Reviewed, Radiology Reviewed and Wound Reviewed.    Review of Systems:  Review of Systems   Constitutional: Negative for chills, diaphoresis and fever.   HENT: Negative for congestion.    Respiratory: Negative for cough and shortness of breath.    Cardiovascular: Positive for leg swelling. Negative for chest pain and palpitations.   Gastrointestinal: Negative for abdominal pain, constipation, diarrhea, nausea and vomiting.   Genitourinary: Negative for dysuria and hematuria.   Musculoskeletal: Positive for joint pain.   Skin: Negative for itching.   Neurological: Positive for sensory change. Negative for headaches.       Hemodynamics:  Temp (24hrs), Av.8 °C (98.3 °F), Min:36.6 °C (97.8 °F), Max:37.2 °C (98.9 °F)  Temperature: 36.8 °C (98.2 °F)  Pulse  Av.9  Min: 77  Max: 121   Blood Pressure: 154/76       Physical Exam:  Physical Exam   Constitutional: He is oriented to person, place, and time. He appears well-developed. No distress.   HENT:   Head: Normocephalic and atraumatic.   Eyes: Pupils are equal, round, and reactive to light. Conjunctivae and EOM are normal. No scleral icterus.   Neck: Normal range of motion. Neck supple. No JVD present.   Cardiovascular: Normal rate, regular rhythm and normal heart sounds.  Exam reveals no friction rub.    No murmur heard.  Faint distal LLE pulse.   Pulmonary/Chest: Effort normal and breath sounds normal. No respiratory distress. He has no rales.   Abdominal: Soft. Bowel sounds are  normal. He exhibits no distension. There is no rebound and no guarding.   Musculoskeletal: He exhibits edema and tenderness.   Left 4th toe amp site- sutures in place, mild dehiscence along the incision, no active drainage, no odor, trace maceration to proximal portion of incision, minimal erythema along incision, mild erythema along lateral edge of foot that is warm to touch, tender.    Neurological: He is alert and oriented to person, place, and time.   Skin: Skin is warm. He is not diaphoretic. There is erythema.   Psychiatric: He has a normal mood and affect.   Nursing note and vitals reviewed.      Meds:    Current Facility-Administered Medications:   •  insulin regular **AND** Accu-Chek ACHS **AND** NOTIFY MD and PharmD **AND** glucose 4 g **AND** dextrose 50%  •  aspirin  •  [START ON 10/4/2018] lisinopril  •  sulfamethoxazole-trimethoprim  •  glimepiride  •  metFORMIN  •  metoprolol  •  lovastatin  •  hydroCHLOROthiazide  •  senna-docusate **AND** polyethylene glycol/lytes **AND** magnesium hydroxide **AND** bisacodyl  •  heparin  •  ondansetron  •  ondansetron  •  promethazine  •  promethazine  •  prochlorperazine  •  acetaminophen  •  oxyCODONE immediate-release  •  ampicillin-sulbactam (UNASYN) IV    Labs:  Recent Labs      10/01/18   0428  10/02/18   1620  10/03/18   0447   WBC  15.6*  12.3*  10.6   RBC  4.24*  4.10*  4.12*   HEMOGLOBIN  12.9*  13.1*  12.7*   HEMATOCRIT  37.2*  36.7*  36.8*   MCV  87.7  89.5  89.3   MCH  30.4  32.0  30.8   RDW  39.5  40.3  39.9   PLATELETCT  248  241  263   MPV  10.7  10.8  10.5   NEUTSPOLYS  82.30*  79.80*  76.70*   LYMPHOCYTES  8.50*  9.70*  12.50*   MONOCYTES  7.30  6.60  6.70   EOSINOPHILS  1.00  2.90  3.00   BASOPHILS  0.30  0.40  0.40     Recent Labs      10/01/18   0428  10/02/18   1620  10/03/18   0447   SODIUM  134*  137  137   POTASSIUM  3.9  3.8  3.7   CHLORIDE  104  105  105   CO2  20  27  23   GLUCOSE  176*  103*  115*   BUN  30*  23*  17     Recent Labs       10/01/18   0428  10/02/18   1620  10/03/18   0447   ALBUMIN  2.5*   --   2.5*   TBILIRUBIN  0.7   --   0.5   ALKPHOSPHAT  65   --   70   TOTPROTEIN  5.7*   --   6.2   ALTSGPT  11   --   17   ASTSGOT  13   --   21   CREATININE  1.09  0.88  0.85       Imaging:  No recent imaging.      Micro:  Results     Procedure Component Value Units Date/Time    CULTURE WOUND W/ GRAM STAIN [[id number]]  (Abnormal) Collected:  09/29/18 1700    Order Status:  Completed Specimen:  Wound Updated:  10/02/18 1153     Significant Indicator POS (POS)     Source WND     Site Left Foot Abscess     Culture Result Wound -- (A)     Gram Stain Result Moderate WBCs.  Moderate Gram positive cocci.  Rare Gram negative rods.       Culture Result Wound Streptococcus agalactiae (Group B)  Heavy growth   (A)      Viridans Streptococcus  Moderate growth   (A)      Coagulase-negative Staphylococcus species  Light growth   (A)    ANAEROBIC CULTURE [[id number]] Collected:  09/29/18 1700    Order Status:  Completed Specimen:  Wound Updated:  10/02/18 1153     Significant Indicator NEG     Source WND     Site Left Foot Abscess     Anaerobic Culture, Culture Res Light growth possible anaerobes;  further incubation required.      FUNGAL CULTURE [[id number]] Collected:  09/29/18 1700    Order Status:  Completed Specimen:  Wound Updated:  10/02/18 1153     Significant Indicator NEG     Source WND     Site Left Foot Abscess     Fungal Culture Culture in progress.    AFB CULTURE [[id number]] Collected:  09/29/18 1700    Order Status:  Completed Specimen:  Wound Updated:  10/02/18 1153     Significant Indicator NEG     Source WND     Site Left Foot Abscess     AFB Culture Culture in progress.  NOTE:  Swabs are not recommended for the isolation of  Mycobacteria, since they provide limited material.  They  are acceptable only if a specimen cannot be collected by  any other means.  Negative results obtained from these  specimens submitted on swabs are not reliable.        AFB Smear Results No acid fast bacilli seen.    URINE CULTURE(NEW) [[id number]] Collected:  09/30/18 1440    Order Status:  Completed Specimen:  Urine from Urine, Clean Catch Updated:  10/02/18 0931     Significant Indicator NEG     Source UR     Site URINE, CLEAN CATCH     Urine Culture No growth at 48 hours.    Narrative:       Collected By:Chinedu CUNNINGHAM  Indication for culture:->Emergency Room Patient    GRAM STAIN [[id number]] Collected:  09/29/18 1700    Order Status:  Completed Specimen:  Wound Updated:  09/30/18 1751     Significant Indicator .     Source WND     Site Left Foot Abscess     Gram Stain Result Moderate WBCs.  Moderate Gram positive cocci.  Rare Gram negative rods.      ACID FAST STAIN [[id number]] Collected:  09/29/18 1700    Order Status:  Completed Specimen:  Wound Updated:  09/30/18 1751     Significant Indicator NEG     Source WND     Site Left Foot Abscess     AFB Smear Results No acid fast bacilli seen.    URINALYSIS [[id number]]  (Abnormal) Collected:  09/30/18 1440    Order Status:  Completed Specimen:  Urine from Urine, Clean Catch Updated:  09/30/18 1454     Color DK Yellow     Character Clear     Specific Gravity 1.026     Ph 5.0     Glucose >=1000 (A) mg/dL      Ketones Trace (A) mg/dL      Protein 100 (A) mg/dL      Bilirubin Negative     Urobilinogen, Urine 1.0     Nitrite Negative     Leukocyte Esterase Negative     Occult Blood Negative     Micro Urine Req Microscopic    Narrative:       Collected By:Chinedu CUNNINGHAM  Indication for culture:->Emergency Room Patient    BLOOD CULTURE [[id number]] Collected:  09/29/18 1228    Order Status:  Completed Specimen:  Blood from Peripheral Updated:  09/30/18 0902     Significant Indicator NEG     Source BLD     Site PERIPHERAL     Blood Culture No Growth    Note: Blood cultures are incubated for 5 days and  are monitored continuously.Positive blood cultures  are called to the RN and reported as soon as  they are identified.       "Narrative:       Per Hospital Policy: Only change Specimen Src: to \"Line\" if  specified by physician order.    BLOOD CULTURE [[id number]] Collected:  09/29/18 1243    Order Status:  Completed Specimen:  Blood from Peripheral Updated:  09/30/18 0902     Significant Indicator NEG     Source BLD     Site PERIPHERAL     Blood Culture No Growth    Note: Blood cultures are incubated for 5 days and  are monitored continuously.Positive blood cultures  are called to the RN and reported as soon as  they are identified.      Narrative:       Per Hospital Policy: Only change Specimen Src: to \"Line\" if  specified by physician order.    URINALYSIS [[id number]]     Order Status:  Canceled Specimen:  Urine     URINE CULTURE(NEW) [[id number]]     Order Status:  Canceled Specimen:  Urine           Assessment:  Active Hospital Problems    Diagnosis   • *Amputated toe of left foot ([facility]) [Z89.422]   • Diabetic foot infection ([facility]) [E11.628, L08.9]   • Type 2 diabetes mellitus with complication, without long-term current use of insulin ([facility]) [E11.8]   • Sepsis ([facility]) [A41.9]   Left foot abscess    Plan:  Left DM foot infection with abscess  Afecbrile  Leukocytosis resolved  Bcxs on 9/29 NGTD  S/p L 4th toe amputation and I&D of multiloculated abscess on 9/29 with Dr. Lopez  OR cx +GBS, Strep Viridans and CoNS  Waiting on sensitivities for CoNS-spoke with Micro, still pending  Being followed by LPS  Continue IV Unasyn and PO Bactrim  Anticipate 2-4 weeks abx    Uncontrolled diabetes  Hemoglobin A1c 8.3% on 9/30/18  Keep blood sugar <150 control infection and promote wound healing    Renal insufficiency-improving  Renally adjust antibiotics as needed  Avoid nephrotoxins    Prognosis of limb salvage guarded      Discussed with ART Adorno.  No insurance, working to get emergency assistance.  May have to complete treatment inpatient.  "

## 2018-10-03 NOTE — PROGRESS NOTES
"Patient seen and examined  WBC trending down  Cultures Group B strep and Agalactiae    Blood pressure 158/92, pulse 80, temperature 36.6 °C (97.8 °F), resp. rate 18, height 1.74 m (5' 8.5\"), weight 79.4 kg (175 lb), SpO2 96 %.    Recent Labs      10/01/18   0428  10/02/18   1620  10/03/18   0447   WBC  15.6*  12.3*  10.6   RBC  4.24*  4.10*  4.12*   HEMOGLOBIN  12.9*  13.1*  12.7*   HEMATOCRIT  37.2*  36.7*  36.8*   MCV  87.7  89.5  89.3   MCH  30.4  32.0  30.8   MCHC  34.7  35.7*  34.5   RDW  39.5  40.3  39.9   PLATELETCT  248  241  263   MPV  10.7  10.8  10.5       No acute distress  Dressing clean dry and intact  Neurovascularly intact    POD#4    Plan:  DVT Prophylaxis- TEDS/SCDs  Weight Bearing Status-WBAT   PT/OT  Antibiotics: per ID  Case Coordination          "

## 2018-10-03 NOTE — PROGRESS NOTES
[facility] Medicine Daily Progress Note    Date of Service  10/3/2018    Chief Complaint  62 y.o. male with PMH significant for DM, HTN, and dyslipidemia admitted 9/29/2018 with redness swelling of left foot.  He dropped a piece of furniture on his left foot about 3 weeks ago.  About 1 week ago he noticed left fourth toe necrosis with associated foot swelling.     Hospital Course   He underwent left fourth toe amputation through MP joint and irrigation and debridement of multiloculated dorsal foot abscesses by Dr. Lopez on 9/29/2018. Wound culture (+) for Strep agalactiae (Group B).     Interval Problem Update  10/3 - POD #4 pain now 6/10 with ambulation and 0/10 at rest. He refuses any narcotics, only wants Tylenol or ASA  -left foot remains 1+ edema with ecchymosis and PPP. Wound draining brown thin fluid, non-odorous.    10/2 - POD #3 - ongoing 10/10 pain with ambulation. 3-4/10 at rest  -hemodynamically stable  -BM yesterday  -dressing changed yesterday  -plan for 2-4 weeks IV ABX per ID recommendations  -LEONEL resolved. DC IVF  -better blood glucose control after restarting home meds     10/1 - POD #2  -he reports 10/10 pain left foot when up ambulating with walker  -wound culture growing Streptococcus agalactiae (Group B) Heavy growth - ID consulted  -blood sugars 170-332 - restarted home meds and adjusted sliding scale. Diabetic Educator consult pending  -added Metoprolol today for better BP control    Consultants/Specialty  -Orthopedics - Dr. Lopez    Code Status  FULL    Disposition  TBD  Does not have insurance, working to get emergency assistance.  May have to complete treatment inpatient.    Review of Systems  Review of Systems   Constitutional: Negative for chills and fever.   HENT: Negative.    Eyes: Negative.    Respiratory: Negative for cough, shortness of breath and wheezing.    Cardiovascular: Negative for chest pain and palpitations.   Gastrointestinal: Negative for abdominal pain,  constipation, diarrhea, nausea and vomiting.   Genitourinary: Negative.    Musculoskeletal: Positive for joint pain (improving). Negative for back pain and falls.   Neurological: Positive for focal weakness (left foot due to pain).   Psychiatric/Behavioral: Negative.    All other systems reviewed and are negative.       Physical Exam  Temp:  [36.6 °C (97.8 °F)-37.2 °C (98.9 °F)] 36.8 °C (98.2 °F)  Pulse:  [77-83] 77  Resp:  [17-18] 17  BP: (136-158)/(73-92) 154/76    Physical Exam   Constitutional: He is oriented to person, place, and time. Vital signs are normal. He appears well-developed. He is cooperative.  Non-toxic appearance. He has a sickly appearance. No distress.   Sitting up in bed watching TV in no distress   HENT:   Head: Normocephalic.   Right Ear: Hearing normal.   Left Ear: Hearing normal.   Nose: Nose normal.   Mouth/Throat: Oropharynx is clear and moist and mucous membranes are normal.   Eyes: Conjunctivae, EOM and lids are normal. No scleral icterus.   Neck: Phonation normal. No JVD present.   Cardiovascular: Normal rate and normal heart sounds.  Exam reveals decreased pulses.    1+ pedal/ankle edema LLE   Pulmonary/Chest: Effort normal and breath sounds normal. He has no rhonchi.   Abdominal: Soft. Normal appearance. He exhibits no distension. There is no tenderness. There is no rebound.   Musculoskeletal:   Decreased ROM left ankle/foot due to pain   Neurological: He is alert and oriented to person, place, and time. No cranial nerve deficit. GCS eye subscore is 4. GCS verbal subscore is 5. GCS motor subscore is 6.   Moves all extremities   Skin: Skin is warm and dry.   Personally removed left foot dressing today.  No active drainage noted. Dried thin brown on drsg.  Foot with ecchymosis. PPP. 1+ edema     Psychiatric: He has a normal mood and affect. His speech is normal and behavior is normal. Judgment normal. Cognition and memory are normal.   Nursing note and vitals  reviewed.      Fluids    Intake/Output Summary (Last 24 hours) at 10/03/18 1535  Last data filed at 10/03/18 0900   Gross per 24 hour   Intake                0 ml   Output              900 ml   Net             -900 ml       Laboratory  Recent Labs      10/01/18   0428  10/02/18   1620  10/03/18   0447   WBC  15.6*  12.3*  10.6   RBC  4.24*  4.10*  4.12*   HEMOGLOBIN  12.9*  13.1*  12.7*   HEMATOCRIT  37.2*  36.7*  36.8*   MCV  87.7  89.5  89.3   MCH  30.4  32.0  30.8   MCHC  34.7  35.7*  34.5   RDW  39.5  40.3  39.9   PLATELETCT  248  241  263   MPV  10.7  10.8  10.5     Recent Labs      10/01/18   0428  10/02/18   1620  10/03/18   0447   SODIUM  134*  137  137   POTASSIUM  3.9  3.8  3.7   CHLORIDE  104  105  105   CO2  20  27  23   GLUCOSE  176*  103*  115*   BUN  30*  23*  17   CREATININE  1.09  0.88  0.85   CALCIUM  8.0*  8.4*  8.5                   Imaging  DX-FOOT-COMPLETE 3+ LEFT   Final Result      1.  No radiographic evidence for osteomyelitis      2.  Gas associated with the lateral forefoot      DX-CHEST-PORTABLE (1 VIEW)   Final Result      Negative single view of the chest.           Assessment/Plan  * Amputated toe of left foot (HCC)   Assessment & Plan    -4th toe by Dr. Lopez 9/29/2018  -6/10 pain with ambulation - 0-1/10 with rest  -he refuses narcotics.   -PT/OT        Diabetic foot infection (HCC)- (present on admission)   Assessment & Plan    -Status post fourth toe amputation left foot on 9/29/2018 by orthopedic surgery  -Blood cultures negative  -Limb preservation services/Wound Care  -wound culture Streptococcus agalactiae (Group B) Heavy growth -ID following  -Analgesic pain control  -better blood sugar control on his home meds        Sepsis ([facility])- (present on admission)   Assessment & Plan    -resolved  -s/p Left fourth toe amputation through MP joint with irrigation and debridement of multiloculated dorsal foot abscess.  -leukocytosis resolved   -afebrile  -Blood cultures negative to  date. Wound culture Streptococcus agalactiae (Group B) Heavy growth   -ID   -IV ABX          Essential hypertension- (present on admission)   Assessment & Plan    -stable on HCTZ, Lisinopril and Metoprolol                Dyslipidemia- (present on admission)   Assessment & Plan    -Home lovastatin        Type 2 diabetes mellitus with complication, without long-term current use of insulin (HCC)- (present on admission)   Assessment & Plan    -better control now that he is back on his home meds  -decreased SSI dosing  -continue ACHS accu-checks with SSI  -continue home meds  -Diabetic Diet               VTE prophylaxis: Heparin    GUS Atkins

## 2018-10-03 NOTE — DOCUMENTATION QUERY
DOCUMENTATION QUERY    PROVIDERS: Please select “Cosign w/ note”to reply to query.    To better represent the severity of illness of your patient, please review the following information and exercise your independent professional judgment in responding to this query.     Uncontrolled diabetes is documented in the Progress Notes. Based upon the clinical findings, risk factors, and treatment, can this diagnosis be further specified?    • Uncontrolled diabetes meaning with hypoglycemia  • Uncontrolled diabetes meaning with hyperglycemia  • Findings of no clinical significance  • Unable to determine    The medical record reflects the following:   Clinical Findings Per Progress Notes:   Uncontrolled diabetes  Hemoglobin A1c 8.3% on 9/30/18    Results Review:   Glucose range:    Treatment Accu-checks AC & HS, Humulin R sliding scale, metformin, glimepiride, & Diabetes Educator Consultation   Risk Factors Age, sepsis, acute kidney injury, HTN, & DM2 with foot ulcer s/p 4th toe amputation   Location within medical record History & Physical, Progress Notes, & Lab Results      Thank you,   Juana Haney RN  Clinical   Phone [phone]

## 2018-10-03 NOTE — CARE PLAN
Problem: Safety  Goal: Will remain free from falls  Outcome: PROGRESSING AS EXPECTED  Proper signs communicated in pts doorway; floor clear of clutter, debris, or cords; pts personal items and call light within reach; pt educated to use call light for assistance and prior to getting out of bed    Problem: Knowledge Deficit  Goal: Knowledge of disease process/condition, treatment plan, diagnostic tests, and medications will improve  Pt educated regarding plan of care and medications. All questions answered.

## 2018-10-03 NOTE — THERAPY
"Physical Therapy Treatment completed.   Bed Mobility:  Supine to Sit: Supervised  Transfers: Sit to Stand: Stand by Assist  Gait: Level Of Assist: Stand by Assist with Front-Wheel Walker       Plan of Care: Will benefit from Physical Therapy 3 times per week and Plan to complete next treatment by Friday 10/5  Discharge Recommendations: Equipment: Front-Wheel Walker. Post-acute therapy Discharge to home with outpatient or home health for additional skilled therapy services.    Pt is demonstrating slight improvements in functional mobility since initial evaluation. Pt was able to ambulated increased distance in hallway with FWW, SBA. Pt able to maintain Heel WB, however, required cues for gait pattern with FWW. Pt prefers to lift FWW to advance vs rolling FWW to advance. Pt's UE's fatiguing and quality of gait declining with fatigue. Pt will need FWW upon DC for ambulation. Pt educated on supine therapeutic exercises to help maintain LE strength while in the acute care setting and with limited WB status. Plan to follow 1-2 more sessions prior to DC for additional gait training and ascending/descending 1 step     See \"Rehab Therapy-Acute\" Patient Summary Report for complete documentation.       "

## 2018-10-04 PROBLEM — A41.9 SEPSIS (HCC): Status: RESOLVED | Noted: 2018-09-29 | Resolved: 2018-10-04

## 2018-10-04 LAB
BACTERIA BLD CULT: NORMAL
BACTERIA BLD CULT: NORMAL
BACTERIA WND AEROBE CULT: ABNORMAL
GLUCOSE BLD-MCNC: 100 MG/DL (ref 65–99)
GLUCOSE BLD-MCNC: 101 MG/DL (ref 65–99)
GLUCOSE BLD-MCNC: 111 MG/DL (ref 65–99)
GLUCOSE BLD-MCNC: 143 MG/DL (ref 65–99)
GRAM STN SPEC: ABNORMAL
SIGNIFICANT IND 70042: ABNORMAL
SIGNIFICANT IND 70042: NORMAL
SIGNIFICANT IND 70042: NORMAL
SITE SITE: ABNORMAL
SITE SITE: NORMAL
SITE SITE: NORMAL
SOURCE SOURCE: ABNORMAL
SOURCE SOURCE: NORMAL
SOURCE SOURCE: NORMAL

## 2018-10-04 PROCEDURE — 700111 HCHG RX REV CODE 636 W/ 250 OVERRIDE (IP): Performed by: HOSPITALIST

## 2018-10-04 PROCEDURE — 700111 HCHG RX REV CODE 636 W/ 250 OVERRIDE (IP): Performed by: PHYSICIAN ASSISTANT

## 2018-10-04 PROCEDURE — 770006 HCHG ROOM/CARE - MED/SURG/GYN SEMI*

## 2018-10-04 PROCEDURE — 82962 GLUCOSE BLOOD TEST: CPT | Mod: 91

## 2018-10-04 PROCEDURE — 700102 HCHG RX REV CODE 250 W/ 637 OVERRIDE(OP): Performed by: NURSE PRACTITIONER

## 2018-10-04 PROCEDURE — A9270 NON-COVERED ITEM OR SERVICE: HCPCS | Performed by: NURSE PRACTITIONER

## 2018-10-04 PROCEDURE — 99232 SBSQ HOSP IP/OBS MODERATE 35: CPT | Performed by: INTERNAL MEDICINE

## 2018-10-04 PROCEDURE — 700105 HCHG RX REV CODE 258

## 2018-10-04 PROCEDURE — 700105 HCHG RX REV CODE 258: Performed by: PHYSICIAN ASSISTANT

## 2018-10-04 RX ORDER — SODIUM CHLORIDE 9 MG/ML
INJECTION, SOLUTION INTRAVENOUS
Status: COMPLETED
Start: 2018-10-04 | End: 2018-10-04

## 2018-10-04 RX ADMIN — SODIUM CHLORIDE: 9 INJECTION, SOLUTION INTRAVENOUS at 12:00

## 2018-10-04 RX ADMIN — METFORMIN HYDROCHLORIDE 850 MG: 850 TABLET ORAL at 06:09

## 2018-10-04 RX ADMIN — AMPICILLIN AND SULBACTAM 3 G: 2; 1 INJECTION, POWDER, FOR SOLUTION INTRAVENOUS at 06:11

## 2018-10-04 RX ADMIN — AMPICILLIN AND SULBACTAM 3 G: 2; 1 INJECTION, POWDER, FOR SOLUTION INTRAVENOUS at 17:27

## 2018-10-04 RX ADMIN — SULFAMETHOXAZOLE AND TRIMETHOPRIM 1 TABLET: 800; 160 TABLET ORAL at 06:09

## 2018-10-04 RX ADMIN — METFORMIN HYDROCHLORIDE 850 MG: 850 TABLET ORAL at 17:10

## 2018-10-04 RX ADMIN — AMPICILLIN AND SULBACTAM 3 G: 2; 1 INJECTION, POWDER, FOR SOLUTION INTRAVENOUS at 11:38

## 2018-10-04 RX ADMIN — GLIMEPIRIDE 4 MG: 4 TABLET ORAL at 06:09

## 2018-10-04 RX ADMIN — HEPARIN SODIUM 5000 UNITS: 5000 INJECTION, SOLUTION INTRAVENOUS; SUBCUTANEOUS at 13:22

## 2018-10-04 RX ADMIN — METFORMIN HYDROCHLORIDE 850 MG: 850 TABLET ORAL at 11:38

## 2018-10-04 RX ADMIN — LISINOPRIL 30 MG: 10 TABLET ORAL at 06:09

## 2018-10-04 RX ADMIN — LOVASTATIN 40 MG: 20 TABLET ORAL at 06:09

## 2018-10-04 RX ADMIN — HYDROCHLOROTHIAZIDE 12.5 MG: 12.5 TABLET ORAL at 06:08

## 2018-10-04 RX ADMIN — METOPROLOL TARTRATE 25 MG: 25 TABLET, FILM COATED ORAL at 17:10

## 2018-10-04 RX ADMIN — METOPROLOL TARTRATE 25 MG: 25 TABLET, FILM COATED ORAL at 06:09

## 2018-10-04 RX ADMIN — HEPARIN SODIUM 5000 UNITS: 5000 INJECTION, SOLUTION INTRAVENOUS; SUBCUTANEOUS at 06:11

## 2018-10-04 ASSESSMENT — ENCOUNTER SYMPTOMS
EYES NEGATIVE: 1
VOMITING: 0
WHEEZING: 0
PALPITATIONS: 0
CONSTIPATION: 0
NAUSEA: 0
MYALGIAS: 0
HEADACHES: 1
SENSORY CHANGE: 1
COUGH: 0
PSYCHIATRIC NEGATIVE: 1
CHILLS: 0
FOCAL WEAKNESS: 0
SHORTNESS OF BREATH: 0
DIARRHEA: 0
FALLS: 0
FEVER: 0
SORE THROAT: 0
BACK PAIN: 0
ABDOMINAL PAIN: 0
DIZZINESS: 0

## 2018-10-04 ASSESSMENT — PAIN SCALES - GENERAL
PAINLEVEL_OUTOF10: 0
PAINLEVEL_OUTOF10: 3

## 2018-10-04 NOTE — PROGRESS NOTES
[facility] Medicine Daily Progress Note    Date of Service  10/4/2018    Chief Complaint  62 y.o. male with PMH significant for DM, HTN, and dyslipidemia admitted 9/29/2018 with redness swelling of left foot.  He dropped a piece of furniture on his left foot about 3 weeks ago.  About 1 week ago he noticed left fourth toe necrosis with associated foot swelling.     Hospital Course   He underwent left fourth toe amputation through MP joint and irrigation and debridement of multiloculated dorsal foot abscesses by Dr. Lopez on 9/29/2018. Wound culture (+) for Strep agalactiae (Group B).     Interval Problem Update  10/4 - POD #5 - 0/10 pain at rest, 4-6/10 with ambulation  -left foot dressing intact with dried brown drainage  10/3 - POD #4 pain now 6/10 with ambulation and 0/10 at rest. He refuses any narcotics, only wants Tylenol or ASA  -left foot remains 1+ edema with ecchymosis and PPP. Wound draining brown thin fluid, non-odorous.  10/2 - POD #3 - ongoing 10/10 pain with ambulation. 3-4/10 at rest  -hemodynamically stable  -BM yesterday  -dressing changed yesterday  -plan for 2-4 weeks IV ABX per ID recommendations  -LEONEL resolved. DC IVF  -better blood glucose control after restarting home meds   10/1 - POD #2  -he reports 10/10 pain left foot when up ambulating with walker  -wound culture growing Streptococcus agalactiae (Group B) Heavy growth - ID consulted  -blood sugars 170-332 - restarted home meds and adjusted sliding scale. Diabetic Educator consult pending  -added Metoprolol today for better BP control     Consultants/Specialty  -LPS  -Ortho    Code Status  FULL    Disposition  TBD    Review of Systems  Review of Systems   Constitutional: Negative for chills and fever.   HENT: Negative.    Eyes: Negative.    Respiratory: Negative for cough, shortness of breath and wheezing.    Cardiovascular: Negative for chest pain and palpitations.   Gastrointestinal: Negative for abdominal pain, constipation, diarrhea,  nausea and vomiting.   Genitourinary: Negative.    Musculoskeletal: Positive for joint pain (improving). Negative for back pain and falls.   Skin: Negative.    Neurological: Negative for dizziness and focal weakness.   Psychiatric/Behavioral: Negative.    All other systems reviewed and are negative.       Physical Exam  Temp:  [36.2 °C (97.1 °F)-37.1 °C (98.8 °F)] 36.7 °C (98 °F)  Pulse:  [65-90] 79  Resp:  [17-18] 18  BP: (132-166)/(61-88) 135/77    Physical Exam   Constitutional: He is oriented to person, place, and time. Vital signs are normal. He appears well-developed. He is sleeping. He is easily aroused.  Non-toxic appearance. He has a sickly appearance. No distress.   Sitting up in bed watching TV in no distress   HENT:   Head: Normocephalic.   Right Ear: No decreased hearing is noted.   Left Ear: No decreased hearing is noted.   Nose: Nose normal.   Mouth/Throat: Oropharynx is clear and moist and mucous membranes are normal.   Eyes: Conjunctivae, EOM and lids are normal. No scleral icterus.   Neck: Phonation normal. No JVD present.   Cardiovascular: Normal rate, normal heart sounds and normal pulses.    1+ pedal/ankle edema LLE   Pulmonary/Chest: Effort normal and breath sounds normal. He has no wheezes. He has no rales.   Good IS use   Abdominal: Soft. Normal appearance. He exhibits no distension. There is no tenderness. There is no rebound.   Musculoskeletal:        Feet:    Decreased ROM left ankle/foot due to pain   Neurological: He is oriented to person, place, and time and easily aroused. No cranial nerve deficit. GCS eye subscore is 4. GCS verbal subscore is 5. GCS motor subscore is 6.   Moves all extremities   Skin: Skin is warm and dry.   Personally removed left foot dressing today.  No active drainage noted. Dried thin brown on drsg.  Foot with ecchymosis. PPP. 1+ edema     Psychiatric: He has a normal mood and affect. His speech is normal and behavior is normal. Judgment normal. Cognition and  memory are normal.   Nursing note and vitals reviewed.      Fluids    Intake/Output Summary (Last 24 hours) at 10/04/18 1600  Last data filed at 10/04/18 0900   Gross per 24 hour   Intake              340 ml   Output              450 ml   Net             -110 ml       Laboratory  Recent Labs      10/02/18   1620  10/03/18   0447   WBC  12.3*  10.6   RBC  4.10*  4.12*   HEMOGLOBIN  13.1*  12.7*   HEMATOCRIT  36.7*  36.8*   MCV  89.5  89.3   MCH  32.0  30.8   MCHC  35.7*  34.5   RDW  40.3  39.9   PLATELETCT  241  263   MPV  10.8  10.5     Recent Labs      10/02/18   1620  10/03/18   0447   SODIUM  137  137   POTASSIUM  3.8  3.7   CHLORIDE  105  105   CO2  27  23   GLUCOSE  103*  115*   BUN  23*  17   CREATININE  0.88  0.85   CALCIUM  8.4*  8.5                   Imaging  DX-FOOT-COMPLETE 3+ LEFT   Final Result      1.  No radiographic evidence for osteomyelitis      2.  Gas associated with the lateral forefoot      DX-CHEST-PORTABLE (1 VIEW)   Final Result      Negative single view of the chest.           Assessment/Plan  * Amputated toe of left foot (HCC)   Assessment & Plan    -4th toe by Dr. Lopez 9/29/2018  -6/10 pain with ambulation - 0-1/10 with rest  -refuses narcotics.   -PT/OT        Diabetic foot infection (HCC)- (present on admission)   Assessment & Plan    -Status post fourth toe amputation left foot on 9/29/2018 by orthopedic surgery  -Blood cultures negative  -Limb preservation services/Wound Care  -wound culture Streptococcus agalactiae (Group B) Heavy growth -ID following  -Analgesic pain control  -better blood sugar control on his home meds        Essential hypertension- (present on admission)   Assessment & Plan    -stable on HCTZ, Lisinopril and Metoprolol                Dyslipidemia- (present on admission)   Assessment & Plan    -Home lovastatin        Type 2 diabetes mellitus with complication, without long-term current use of insulin (HCC)- (present on admission)   Assessment & Plan    -better  control now that he is back on his home meds  -continue ACHS accu-checks with SSI  -continue home meds  -Diabetic Diet               VTE prophylaxis: Heparin    CARLOS Atkins.

## 2018-10-04 NOTE — CARE PLAN
Problem: Safety  Goal: Will remain free from injury    Intervention: Provide assistance with mobility  Provided pt with necessary equipment for safe mobilization       Problem: Venous Thromboembolism (VTW)/Deep Vein Thrombosis (DVT) Prevention:  Goal: Patient will participate in Venous Thrombosis (VTE)/Deep Vein Thrombosis (DVT)Prevention Measures    Intervention: Ensure patient wears graduated elastic stockings (ISA hose) and/or SCDs, if ordered, when in bed or chair (Remove at least once per shift for skin check)  Ensured pt SCDs were replaced once pt finished ambulating.

## 2018-10-04 NOTE — PROGRESS NOTES
Infectious Disease Progress Note    Author: GUS Castro Date & Time of service: 10/4/2018  12:18 PM    Chief Complaint:  Left DM foot infection  Left foot abscess    Interval History:  62 y.o. Male with Left foot DM foot infection with abscess s/p 4th toe amputation and I&D.  10/2- Tm 99.7, no WBC, 7/10 L foot pain- improved with rest and pain meds, no issue with abx.  10/3- AF, WBC 10.6, Left foot sore- better than yesterday, tolerating abx.  10/4- AF, no WBC, sleeping- easily aroused, no issue with abx, generalized tenderness to L foot, had a HA this AM that is slowly resolving.  Labs Reviewed, Medications Reviewed, Radiology Reviewed and Wound Reviewed.    Review of Systems:  Review of Systems   Constitutional: Negative for chills, fever and malaise/fatigue.   HENT: Negative for sore throat.    Respiratory: Negative for shortness of breath.    Cardiovascular: Positive for leg swelling. Negative for chest pain.   Gastrointestinal: Negative for abdominal pain, constipation, diarrhea, nausea and vomiting.   Genitourinary: Negative for dysuria.   Musculoskeletal: Positive for joint pain. Negative for myalgias.   Skin: Negative for rash.   Neurological: Positive for sensory change and headaches.       Hemodynamics:  Temp (24hrs), Av.7 °C (98 °F), Min:36.2 °C (97.1 °F), Max:37.1 °C (98.8 °F)  Temperature: 36.7 °C (98 °F)  Pulse  Av  Min: 65  Max: 121   Blood Pressure: 135/77       Physical Exam:  Physical Exam   Constitutional: He is oriented to person, place, and time. He appears well-developed and well-nourished. No distress.   HENT:   Head: Normocephalic and atraumatic.   Eyes: Pupils are equal, round, and reactive to light. Conjunctivae and EOM are normal.   Neck: Normal range of motion. Neck supple. No tracheal deviation present.   Cardiovascular: Normal rate, regular rhythm and normal heart sounds.    No murmur heard.  Pulmonary/Chest: Effort normal and breath sounds normal. No  respiratory distress. He has no wheezes.   Abdominal: Soft. Bowel sounds are normal. He exhibits no distension. There is no tenderness.   Musculoskeletal: He exhibits edema and tenderness.   Left 4th toe amp site- dressing in place, serous drainage coming through dressing, no odor, toes warm and able to wiggle.   Neurological: He is alert and oriented to person, place, and time.   Skin: Skin is warm. No rash noted.   Psychiatric: He has a normal mood and affect. His behavior is normal.   Nursing note and vitals reviewed.      Meds:    Current Facility-Administered Medications:   •  NS  •  insulin regular **AND** Accu-Chek ACHS **AND** NOTIFY MD and PharmD **AND** glucose 4 g **AND** dextrose 50%  •  aspirin  •  lisinopril  •  sulfamethoxazole-trimethoprim  •  glimepiride  •  metFORMIN  •  metoprolol  •  lovastatin  •  hydroCHLOROthiazide  •  senna-docusate **AND** polyethylene glycol/lytes **AND** magnesium hydroxide **AND** bisacodyl  •  heparin  •  ondansetron  •  ondansetron  •  promethazine  •  promethazine  •  prochlorperazine  •  acetaminophen  •  oxyCODONE immediate-release  •  ampicillin-sulbactam (UNASYN) IV    Labs:  Recent Labs      10/02/18   1620  10/03/18   0447   WBC  12.3*  10.6   RBC  4.10*  4.12*   HEMOGLOBIN  13.1*  12.7*   HEMATOCRIT  36.7*  36.8*   MCV  89.5  89.3   MCH  32.0  30.8   RDW  40.3  39.9   PLATELETCT  241  263   MPV  10.8  10.5   NEUTSPOLYS  79.80*  76.70*   LYMPHOCYTES  9.70*  12.50*   MONOCYTES  6.60  6.70   EOSINOPHILS  2.90  3.00   BASOPHILS  0.40  0.40     Recent Labs      10/02/18   1620  10/03/18   0447   SODIUM  137  137   POTASSIUM  3.8  3.7   CHLORIDE  105  105   CO2  27  23   GLUCOSE  103*  115*   BUN  23*  17     Recent Labs      10/02/18   1620  10/03/18   0447   ALBUMIN   --   2.5*   TBILIRUBIN   --   0.5   ALKPHOSPHAT   --   70   TOTPROTEIN   --   6.2   ALTSGPT   --   17   ASTSGOT   --   21   CREATININE  0.88  0.85       Imaging:  No recent  imaging.      Micro:  Results     Procedure Component Value Units Date/Time    CULTURE WOUND W/ GRAM STAIN [[id number]]  (Abnormal)  (Susceptibility) Collected:  09/29/18 1700    Order Status:  Completed Specimen:  Wound Updated:  10/04/18 0854     Significant Indicator POS (POS)     Source WND     Site Left Foot Abscess     Culture Result Wound -- (A)     Gram Stain Result Moderate WBCs.  Moderate Gram positive cocci.  Rare Gram negative rods.       Culture Result Wound Streptococcus agalactiae (Group B)  Heavy growth   (A)      Viridans Streptococcus  Moderate growth   (A)      Staphylococcus epidermidis  Light growth   (A)    Culture & Susceptibility     STAPHYLOCOCCUS EPIDERMIDIS     Antibiotic Sensitivity Microscan Unit Status    Ampicillin/sulbactam Sensitive <=8/4 mcg/mL Final    Method: SENSITIVITY, SHAKA    Clindamycin Sensitive <=0.5 mcg/mL Final    Method: SENSITIVITY, SHAKA    Daptomycin Sensitive <=0.5 mcg/mL Final    Method: SENSITIVITY, SHAKA    Erythromycin Resistant >4 mcg/mL Final    Method: SENSITIVITY, SHAKA    Moxifloxacin Sensitive <=0.5 mcg/mL Final    Method: SENSITIVITY, SHAKA    Oxacillin Sensitive <=0.25 mcg/mL Final    Method: SENSITIVITY, SHAKA    Penicillin Resistant 8 mcg/mL Final    Method: SENSITIVITY, SHAKA    Tetracycline Intermediate 8 mcg/mL Final    Method: SENSITIVITY, SHAKA    Trimeth/Sulfa Sensitive <=0.5/9.5 mcg/mL Final    Method: SENSITIVITY, SHAKA    Vancomycin Sensitive 2 mcg/mL Final    Method: SENSITIVITY, SHAKA                       ANAEROBIC CULTURE [[id number]] Collected:  09/29/18 1700    Order Status:  Completed Specimen:  Wound Updated:  10/04/18 0854     Significant Indicator NEG     Source WND     Site Left Foot Abscess     Anaerobic Culture, Culture Res Light growth possible anaerobes;  further incubation required.      FUNGAL CULTURE [[id number]] Collected:  09/29/18 1700    Order Status:  Completed Specimen:  Wound Updated:  10/04/18 0854     Significant Indicator NEG     Source  WND     Site Left Foot Abscess     Fungal Culture Culture in progress.    AFB CULTURE [[id number]] Collected:  09/29/18 1700    Order Status:  Completed Specimen:  Wound Updated:  10/04/18 0854     Significant Indicator NEG     Source WND     Site Left Foot Abscess     AFB Culture Culture in progress.  NOTE:  Swabs are not recommended for the isolation of  Mycobacteria, since they provide limited material.  They  are acceptable only if a specimen cannot be collected by  any other means.  Negative results obtained from these  specimens submitted on swabs are not reliable.       AFB Smear Results No acid fast bacilli seen.    URINE CULTURE(NEW) [[id number]] Collected:  09/30/18 1440    Order Status:  Completed Specimen:  Urine from Urine, Clean Catch Updated:  10/02/18 0931     Significant Indicator NEG     Source UR     Site URINE, CLEAN CATCH     Urine Culture No growth at 48 hours.    Narrative:       Collected By:[id number] CUNNINGHAM  Indication for culture:->Emergency Room Patient    GRAM STAIN [[id number]] Collected:  09/29/18 1700    Order Status:  Completed Specimen:  Wound Updated:  09/30/18 1751     Significant Indicator .     Source WND     Site Left Foot Abscess     Gram Stain Result Moderate WBCs.  Moderate Gram positive cocci.  Rare Gram negative rods.      ACID FAST STAIN [[id number]] Collected:  09/29/18 1700    Order Status:  Completed Specimen:  Wound Updated:  09/30/18 1751     Significant Indicator NEG     Source WND     Site Left Foot Abscess     AFB Smear Results No acid fast bacilli seen.    URINALYSIS [[id number]]  (Abnormal) Collected:  09/30/18 1440    Order Status:  Completed Specimen:  Urine from Urine, Clean Catch Updated:  09/30/18 1454     Color DK Yellow     Character Clear     Specific Gravity 1.026     Ph 5.0     Glucose >=1000 (A) mg/dL      Ketones Trace (A) mg/dL      Protein 100 (A) mg/dL      Bilirubin Negative     Urobilinogen, Urine 1.0     Nitrite Negative     Leukocyte Esterase  "Negative     Occult Blood Negative     Micro Urine Req Microscopic    Narrative:       Collected By:[id number] QUYEN CUNNINGHAM  Indication for culture:->Emergency Room Patient    BLOOD CULTURE [[id number]] Collected:  09/29/18 1228    Order Status:  Completed Specimen:  Blood from Peripheral Updated:  09/30/18 0902     Significant Indicator NEG     Source BLD     Site PERIPHERAL     Blood Culture No Growth    Note: Blood cultures are incubated for 5 days and  are monitored continuously.Positive blood cultures  are called to the RN and reported as soon as  they are identified.      Narrative:       Per Hospital Policy: Only change Specimen Src: to \"Line\" if  specified by physician order.    BLOOD CULTURE [[id number]] Collected:  09/29/18 1243    Order Status:  Completed Specimen:  Blood from Peripheral Updated:  09/30/18 0902     Significant Indicator NEG     Source BLD     Site PERIPHERAL     Blood Culture No Growth    Note: Blood cultures are incubated for 5 days and  are monitored continuously.Positive blood cultures  are called to the RN and reported as soon as  they are identified.      Narrative:       Per Hospital Policy: Only change Specimen Src: to \"Line\" if  specified by physician order.    URINALYSIS [[id number]]     Order Status:  Canceled Specimen:  Urine     URINE CULTURE(NEW) [[id number]]     Order Status:  Canceled Specimen:  Urine           Assessment:  Active Hospital Problems    Diagnosis   • *Amputated toe of left foot ([facility]) [Z89.422]   • Diabetic foot infection ([facility]) [E11.628, L08.9]   • Type 2 diabetes mellitus with complication, without long-term current use of insulin ([facility]) [E11.8]   • Sepsis ([facility]) [A41.9]   Left foot abscess    Plan:  Left DM foot infection with abscess  Afebrile  Leukocytosis resolved on last check  CBC ordered for tomorrow  Bcxs on 9/29 NGTD  S/p L 4th toe amputation and I&D of multiloculated abscess on 9/29 with Dr. Lopez  OR cx +GBS, Strep Viridans and MSSE  Being followed by " LPS  Continue IV Unasyn   Stop PO Bactrim  Anticipate 2-4 weeks abx    Uncontrolled diabetes  Hemoglobin A1c 8.3% on 9/30/18  Keep blood sugar <150 control infection and promote wound healing    Renal insufficiency-improving  Renally adjust antibiotics as needed  Avoid nephrotoxins  BMP ordered for tomorrow    Prognosis of limb salvage guarded      No insurance, will likely have to complete treatment inpatient.

## 2018-10-05 ENCOUNTER — APPOINTMENT (OUTPATIENT)
Dept: RADIOLOGY | Facility: MEDICAL CENTER | Age: 63
DRG: 854 | End: 2018-10-05
Attending: NURSE PRACTITIONER
Payer: MEDICAID

## 2018-10-05 LAB
ANION GAP SERPL CALC-SCNC: 7 MMOL/L (ref 0–11.9)
BACTERIA SPEC ANAEROBE CULT: ABNORMAL
BACTERIA SPEC ANAEROBE CULT: ABNORMAL
BUN SERPL-MCNC: 19 MG/DL (ref 8–22)
CALCIUM SERPL-MCNC: 8.4 MG/DL (ref 8.5–10.5)
CHLORIDE SERPL-SCNC: 102 MMOL/L (ref 96–112)
CO2 SERPL-SCNC: 28 MMOL/L (ref 20–33)
CREAT SERPL-MCNC: 1.1 MG/DL (ref 0.5–1.4)
ERYTHROCYTE [DISTWIDTH] IN BLOOD BY AUTOMATED COUNT: 40.4 FL (ref 35.9–50)
GLUCOSE BLD-MCNC: 118 MG/DL (ref 65–99)
GLUCOSE BLD-MCNC: 126 MG/DL (ref 65–99)
GLUCOSE BLD-MCNC: 146 MG/DL (ref 65–99)
GLUCOSE BLD-MCNC: 158 MG/DL (ref 65–99)
GLUCOSE SERPL-MCNC: 129 MG/DL (ref 65–99)
HCT VFR BLD AUTO: 35.2 % (ref 42–52)
HGB BLD-MCNC: 12.5 G/DL (ref 14–18)
MCH RBC QN AUTO: 31.7 PG (ref 27–33)
MCHC RBC AUTO-ENTMCNC: 35.5 G/DL (ref 33.7–35.3)
MCV RBC AUTO: 89.3 FL (ref 81.4–97.8)
PLATELET # BLD AUTO: 288 K/UL (ref 164–446)
PMV BLD AUTO: 10.9 FL (ref 9–12.9)
POTASSIUM SERPL-SCNC: 3.8 MMOL/L (ref 3.6–5.5)
RBC # BLD AUTO: 3.94 M/UL (ref 4.7–6.1)
SIGNIFICANT IND 70042: ABNORMAL
SITE SITE: ABNORMAL
SODIUM SERPL-SCNC: 137 MMOL/L (ref 135–145)
SOURCE SOURCE: ABNORMAL
WBC # BLD AUTO: 10.5 K/UL (ref 4.8–10.8)

## 2018-10-05 PROCEDURE — 99232 SBSQ HOSP IP/OBS MODERATE 35: CPT | Performed by: INTERNAL MEDICINE

## 2018-10-05 PROCEDURE — 700111 HCHG RX REV CODE 636 W/ 250 OVERRIDE (IP): Performed by: HOSPITALIST

## 2018-10-05 PROCEDURE — A9270 NON-COVERED ITEM OR SERVICE: HCPCS | Performed by: NURSE PRACTITIONER

## 2018-10-05 PROCEDURE — 700111 HCHG RX REV CODE 636 W/ 250 OVERRIDE (IP): Performed by: PHYSICIAN ASSISTANT

## 2018-10-05 PROCEDURE — 700105 HCHG RX REV CODE 258: Performed by: PHYSICIAN ASSISTANT

## 2018-10-05 PROCEDURE — 82962 GLUCOSE BLOOD TEST: CPT | Mod: 91

## 2018-10-05 PROCEDURE — 770006 HCHG ROOM/CARE - MED/SURG/GYN SEMI*

## 2018-10-05 PROCEDURE — 36569 INSJ PICC 5 YR+ W/O IMAGING: CPT

## 2018-10-05 PROCEDURE — 97116 GAIT TRAINING THERAPY: CPT

## 2018-10-05 PROCEDURE — 85027 COMPLETE CBC AUTOMATED: CPT

## 2018-10-05 PROCEDURE — 97535 SELF CARE MNGMENT TRAINING: CPT

## 2018-10-05 PROCEDURE — 36415 COLL VENOUS BLD VENIPUNCTURE: CPT

## 2018-10-05 PROCEDURE — 80048 BASIC METABOLIC PNL TOTAL CA: CPT

## 2018-10-05 PROCEDURE — B54MZZA ULTRASONOGRAPHY OF RIGHT UPPER EXTREMITY VEINS, GUIDANCE: ICD-10-PCS | Performed by: NURSE PRACTITIONER

## 2018-10-05 PROCEDURE — 05HY33Z INSERTION OF INFUSION DEVICE INTO UPPER VEIN, PERCUTANEOUS APPROACH: ICD-10-PCS | Performed by: NURSE PRACTITIONER

## 2018-10-05 PROCEDURE — 700102 HCHG RX REV CODE 250 W/ 637 OVERRIDE(OP): Performed by: NURSE PRACTITIONER

## 2018-10-05 RX ADMIN — METFORMIN HYDROCHLORIDE 850 MG: 850 TABLET ORAL at 17:22

## 2018-10-05 RX ADMIN — AMPICILLIN AND SULBACTAM 3 G: 2; 1 INJECTION, POWDER, FOR SOLUTION INTRAVENOUS at 11:22

## 2018-10-05 RX ADMIN — AMPICILLIN AND SULBACTAM 3 G: 2; 1 INJECTION, POWDER, FOR SOLUTION INTRAVENOUS at 06:25

## 2018-10-05 RX ADMIN — ASPIRIN 325 MG: 325 TABLET, COATED ORAL at 06:23

## 2018-10-05 RX ADMIN — INSULIN HUMAN 2 UNITS: 100 INJECTION, SOLUTION PARENTERAL at 21:31

## 2018-10-05 RX ADMIN — HEPARIN SODIUM 5000 UNITS: 5000 INJECTION, SOLUTION INTRAVENOUS; SUBCUTANEOUS at 21:34

## 2018-10-05 RX ADMIN — METFORMIN HYDROCHLORIDE 850 MG: 850 TABLET ORAL at 11:19

## 2018-10-05 RX ADMIN — HEPARIN SODIUM 5000 UNITS: 5000 INJECTION, SOLUTION INTRAVENOUS; SUBCUTANEOUS at 06:24

## 2018-10-05 RX ADMIN — LOVASTATIN 40 MG: 20 TABLET ORAL at 06:24

## 2018-10-05 RX ADMIN — METOPROLOL TARTRATE 25 MG: 25 TABLET, FILM COATED ORAL at 17:21

## 2018-10-05 RX ADMIN — METOPROLOL TARTRATE 25 MG: 25 TABLET, FILM COATED ORAL at 06:24

## 2018-10-05 RX ADMIN — METFORMIN HYDROCHLORIDE 850 MG: 850 TABLET ORAL at 06:24

## 2018-10-05 RX ADMIN — AMPICILLIN AND SULBACTAM 3 G: 2; 1 INJECTION, POWDER, FOR SOLUTION INTRAVENOUS at 17:22

## 2018-10-05 RX ADMIN — HYDROCHLOROTHIAZIDE 12.5 MG: 12.5 TABLET ORAL at 06:24

## 2018-10-05 RX ADMIN — HEPARIN SODIUM 5000 UNITS: 5000 INJECTION, SOLUTION INTRAVENOUS; SUBCUTANEOUS at 00:00

## 2018-10-05 RX ADMIN — AMPICILLIN AND SULBACTAM 3 G: 2; 1 INJECTION, POWDER, FOR SOLUTION INTRAVENOUS at 00:00

## 2018-10-05 RX ADMIN — AMPICILLIN AND SULBACTAM 3 G: 2; 1 INJECTION, POWDER, FOR SOLUTION INTRAVENOUS at 23:07

## 2018-10-05 RX ADMIN — GLIMEPIRIDE 4 MG: 4 TABLET ORAL at 06:29

## 2018-10-05 RX ADMIN — HEPARIN SODIUM 5000 UNITS: 5000 INJECTION, SOLUTION INTRAVENOUS; SUBCUTANEOUS at 15:38

## 2018-10-05 RX ADMIN — LISINOPRIL 30 MG: 10 TABLET ORAL at 06:24

## 2018-10-05 ASSESSMENT — PAIN SCALES - GENERAL
PAINLEVEL_OUTOF10: 5
PAINLEVEL_OUTOF10: 0

## 2018-10-05 ASSESSMENT — ENCOUNTER SYMPTOMS
SHORTNESS OF BREATH: 0
SENSORY CHANGE: 1
EYES NEGATIVE: 1
ABDOMINAL PAIN: 0
HEADACHES: 0
BACK PAIN: 0
FEVER: 0
MYALGIAS: 0
FALLS: 0
WHEEZING: 0
WEAKNESS: 0
FOCAL WEAKNESS: 0
COUGH: 0
DIARRHEA: 0
NAUSEA: 0
VOMITING: 0
CHILLS: 0
PALPITATIONS: 0
CONSTIPATION: 0
DIZZINESS: 0
PSYCHIATRIC NEGATIVE: 1

## 2018-10-05 ASSESSMENT — COGNITIVE AND FUNCTIONAL STATUS - GENERAL
SUGGESTED CMS G CODE MODIFIER MOBILITY: CJ
WALKING IN HOSPITAL ROOM: A LITTLE
STANDING UP FROM CHAIR USING ARMS: A LITTLE
MOBILITY SCORE: 21
CLIMB 3 TO 5 STEPS WITH RAILING: A LITTLE

## 2018-10-05 ASSESSMENT — GAIT ASSESSMENTS
GAIT LEVEL OF ASSIST: SUPERVISED
DEVIATION: ANTALGIC;BRADYKINETIC
ASSISTIVE DEVICE: FRONT WHEEL WALKER
DISTANCE (FEET): 150

## 2018-10-05 NOTE — THERAPY
"Physical Therapy Treatment completed.   Bed Mobility:  Supine to Sit: Supervised  Transfers: Sit to Stand: Supervised  Gait: Level Of Assist: Supervised with Front-Wheel Walker       Plan of Care: Will benefit from Physical Therapy 3 times per week  Discharge Recommendations: Equipment: Will Continue to Assess for Equipment Needs.     See \"Rehab Therapy-Acute\" Patient Summary Report for complete documentation.     Pt progressing well w/ therapy. Pt shows improved tolerance to mobility. He does fatigue quickly and demonstrates poor form. Pt educated on getting OOB for every meal and ambulating at least 2x/day w/ nursing staff w/ pt receptive. Anticipate pt only needing one more tx for stair training.  "

## 2018-10-05 NOTE — PROGRESS NOTES
Pt S&E, doing well, reports very little pain, only issue minor headaches at times.  Ambulating without issue, ok to WBAT through heel, avoid forefoot pressure.    S/P Toe amputation and I&D 9/29/18  Group B strep, staph epi and viridans (poss contam)  unasyn and bactrim, recs per ID based on SHAKA  WBAT through heel  PT/OT  Appreciate IM care, case coordination for dispo aid

## 2018-10-05 NOTE — PROGRESS NOTES
Infectious Disease Progress Note    Author: GUS Castro Date & Time of service: 10/5/2018  12:32 PM    Chief Complaint:  Left DM foot infection  Left foot abscess    Interval History:  62 y.o. Male with Left foot DM foot infection with abscess s/p 4th toe amputation and I&D.  10/2- Tm 99.7, no WBC, 7/10 L foot pain- improved with rest and pain meds, no issue with abx.  10/3- AF, WBC 10.6, Left foot sore- better than yesterday, tolerating abx.  10/4- AF, no WBC, sleeping- easily aroused, no issue with abx, generalized tenderness to L foot, had a HA this AM that is slowly resolving.  10/5-T-max 99, WBC 10.5, denies any pain to left foot, resting in bed sleeping-easily arousable, tolerating antibiotics.  Labs Reviewed, Medications Reviewed, Radiology Reviewed and Wound Reviewed.    Review of Systems:  Review of Systems   Constitutional: Negative for chills and fever.   HENT: Negative for congestion.    Respiratory: Negative for cough and shortness of breath.    Cardiovascular: Positive for leg swelling. Negative for chest pain and palpitations.   Gastrointestinal: Negative for abdominal pain, diarrhea, nausea and vomiting.   Genitourinary: Negative for dysuria and hematuria.   Musculoskeletal: Positive for joint pain. Negative for myalgias.   Skin: Negative for itching.   Neurological: Positive for sensory change. Negative for weakness and headaches.       Hemodynamics:  Temp (24hrs), Av.4 °C (99.3 °F), Min:37.2 °C (99 °F), Max:37.5 °C (99.5 °F)  Temperature: 37.5 °C (99.5 °F)  Pulse  Av.6  Min: 65  Max: 121   Blood Pressure: 140/79       Physical Exam:  Physical Exam   Constitutional: He is oriented to person, place, and time. He appears well-developed. No distress.   HENT:   Head: Normocephalic and atraumatic.   Eyes: Pupils are equal, round, and reactive to light. Conjunctivae and EOM are normal. No scleral icterus.   Neck: Normal range of motion. Neck supple. No JVD present.    Cardiovascular: Normal rate, regular rhythm, normal heart sounds and intact distal pulses.  Exam reveals no friction rub.    No murmur heard.  Pulmonary/Chest: Effort normal and breath sounds normal. No respiratory distress. He has no rales.   Abdominal: Soft. Bowel sounds are normal. He exhibits no distension. There is no rebound and no guarding.   Musculoskeletal: He exhibits edema and tenderness.   Left 4th toe amp site- dressing in place, serous drainage seeping through dressing, no odor, toes warm and able to wiggle.   Neurological: He is alert and oriented to person, place, and time.   Skin: Skin is warm. No rash noted. He is not diaphoretic. There is erythema.   Psychiatric: He has a normal mood and affect. Thought content normal.   Nursing note and vitals reviewed.      Meds:    Current Facility-Administered Medications:   •  insulin regular **AND** Accu-Chek ACHS **AND** NOTIFY MD and PharmD **AND** glucose 4 g **AND** dextrose 50%  •  aspirin  •  lisinopril  •  glimepiride  •  metFORMIN  •  metoprolol  •  lovastatin  •  hydroCHLOROthiazide  •  senna-docusate **AND** polyethylene glycol/lytes **AND** magnesium hydroxide **AND** bisacodyl  •  heparin  •  ondansetron  •  ondansetron  •  promethazine  •  promethazine  •  prochlorperazine  •  acetaminophen  •  oxyCODONE immediate-release  •  ampicillin-sulbactam (UNASYN) IV    Labs:  Recent Labs      10/02/18   1620  10/03/18   0447  10/05/18   0546   WBC  12.3*  10.6  10.5   RBC  4.10*  4.12*  3.94*   HEMOGLOBIN  13.1*  12.7*  12.5*   HEMATOCRIT  36.7*  36.8*  35.2*   MCV  89.5  89.3  89.3   MCH  32.0  30.8  31.7   RDW  40.3  39.9  40.4   PLATELETCT  241  263  288   MPV  10.8  10.5  10.9   NEUTSPOLYS  79.80*  76.70*   --    LYMPHOCYTES  9.70*  12.50*   --    MONOCYTES  6.60  6.70   --    EOSINOPHILS  2.90  3.00   --    BASOPHILS  0.40  0.40   --      Recent Labs      10/02/18   1620  10/03/18   0447  10/05/18   0546   SODIUM  137  137  137   POTASSIUM  3.8   "3.7  3.8   CHLORIDE  105  105  102   CO2  27  23  28   GLUCOSE  103*  115*  129*   BUN  23*  17  19     Recent Labs      10/02/18   1620  10/03/18   0447  10/05/18   0546   ALBUMIN   --   2.5*   --    TBILIRUBIN   --   0.5   --    ALKPHOSPHAT   --   70   --    TOTPROTEIN   --   6.2   --    ALTSGPT   --   17   --    ASTSGOT   --   21   --    CREATININE  0.88  0.85  1.10       Imaging:  No recent imaging.      Micro:  Results     Procedure Component Value Units Date/Time    BLOOD CULTURE [[id number]] Collected:  09/29/18 1228    Order Status:  Completed Specimen:  Blood from Peripheral Updated:  10/04/18 1500     Significant Indicator NEG     Source BLD     Site PERIPHERAL     Blood Culture No growth after 5 days of incubation.    Narrative:       Per Hospital Policy: Only change Specimen Src: to \"Line\" if  specified by physician order.    BLOOD CULTURE [[id number]] Collected:  09/29/18 1243    Order Status:  Completed Specimen:  Blood from Peripheral Updated:  10/04/18 1500     Significant Indicator NEG     Source BLD     Site PERIPHERAL     Blood Culture No growth after 5 days of incubation.    Narrative:       Per Hospital Policy: Only change Specimen Src: to \"Line\" if  specified by physician order.    CULTURE WOUND W/ GRAM STAIN [[id number]]  (Abnormal)  (Susceptibility) Collected:  09/29/18 1700    Order Status:  Completed Specimen:  Wound Updated:  10/04/18 0854     Significant Indicator POS (POS)     Source WND     Site Left Foot Abscess     Culture Result Wound -- (A)     Gram Stain Result Moderate WBCs.  Moderate Gram positive cocci.  Rare Gram negative rods.       Culture Result Wound Streptococcus agalactiae (Group B)  Heavy growth   (A)      Viridans Streptococcus  Moderate growth   (A)      Staphylococcus epidermidis  Light growth   (A)    Culture & Susceptibility     STAPHYLOCOCCUS EPIDERMIDIS     Antibiotic Sensitivity Microscan Unit Status    Ampicillin/sulbactam Sensitive <=8/4 mcg/mL Final    Method: " SENSITIVITY, SHAKA    Clindamycin Sensitive <=0.5 mcg/mL Final    Method: SENSITIVITY, SHAKA    Daptomycin Sensitive <=0.5 mcg/mL Final    Method: SENSITIVITY, SHAKA    Erythromycin Resistant >4 mcg/mL Final    Method: SENSITIVITY, SHAKA    Moxifloxacin Sensitive <=0.5 mcg/mL Final    Method: SENSITIVITY, SHAKA    Oxacillin Sensitive <=0.25 mcg/mL Final    Method: SENSITIVITY, SHAKA    Penicillin Resistant 8 mcg/mL Final    Method: SENSITIVITY, SHAKA    Tetracycline Intermediate 8 mcg/mL Final    Method: SENSITIVITY, SHAKA    Trimeth/Sulfa Sensitive <=0.5/9.5 mcg/mL Final    Method: SENSITIVITY, SHAKA    Vancomycin Sensitive 2 mcg/mL Final    Method: SENSITIVITY, SHAKA                       ANAEROBIC CULTURE [[id number]] Collected:  09/29/18 1700    Order Status:  Completed Specimen:  Wound Updated:  10/04/18 0854     Significant Indicator NEG     Source WND     Site Left Foot Abscess     Anaerobic Culture, Culture Res Light growth possible anaerobes;  further incubation required.      FUNGAL CULTURE [[id number]] Collected:  09/29/18 1700    Order Status:  Completed Specimen:  Wound Updated:  10/04/18 0854     Significant Indicator NEG     Source WND     Site Left Foot Abscess     Fungal Culture Culture in progress.    AFB CULTURE [[id number]] Collected:  09/29/18 1700    Order Status:  Completed Specimen:  Wound Updated:  10/04/18 0854     Significant Indicator NEG     Source WND     Site Left Foot Abscess     AFB Culture Culture in progress.  NOTE:  Swabs are not recommended for the isolation of  Mycobacteria, since they provide limited material.  They  are acceptable only if a specimen cannot be collected by  any other means.  Negative results obtained from these  specimens submitted on swabs are not reliable.       AFB Smear Results No acid fast bacilli seen.    URINE CULTURE(NEW) [[id number]] Collected:  09/30/18 1440    Order Status:  Completed Specimen:  Urine from Urine, Clean Catch Updated:  10/02/18 0931     Significant Indicator  NEG     Source UR     Site URINE, CLEAN CATCH     Urine Culture No growth at 48 hours.    Narrative:       Collected By:[id number] QUYEN CUNNINGHAM  Indication for culture:->Emergency Room Patient    GRAM STAIN [[id number]] Collected:  09/29/18 1700    Order Status:  Completed Specimen:  Wound Updated:  09/30/18 1751     Significant Indicator .     Source WND     Site Left Foot Abscess     Gram Stain Result Moderate WBCs.  Moderate Gram positive cocci.  Rare Gram negative rods.      ACID FAST STAIN [[id number]] Collected:  09/29/18 1700    Order Status:  Completed Specimen:  Wound Updated:  09/30/18 1751     Significant Indicator NEG     Source WND     Site Left Foot Abscess     AFB Smear Results No acid fast bacilli seen.    URINALYSIS [[id number]]  (Abnormal) Collected:  09/30/18 1440    Order Status:  Completed Specimen:  Urine from Urine, Clean Catch Updated:  09/30/18 1454     Color DK Yellow     Character Clear     Specific Gravity 1.026     Ph 5.0     Glucose >=1000 (A) mg/dL      Ketones Trace (A) mg/dL      Protein 100 (A) mg/dL      Bilirubin Negative     Urobilinogen, Urine 1.0     Nitrite Negative     Leukocyte Esterase Negative     Occult Blood Negative     Micro Urine Req Microscopic    Narrative:       Collected By:[id number] QUYEN CUNNINGHAM  Indication for culture:->Emergency Room Patient    URINALYSIS [[id number]]     Order Status:  Canceled Specimen:  Urine     URINE CULTURE(NEW) [[id number]]     Order Status:  Canceled Specimen:  Urine           Assessment:  Active Hospital Problems    Diagnosis   • *Amputated toe of left foot ([facility]) [Z89.422]   • Diabetic foot infection ([facility]) [E11.628, L08.9]   • Type 2 diabetes mellitus with complication, without long-term current use of insulin ([facility]) [E11.8]   • Sepsis ([facility]) [A41.9]   Left foot abscess    Plan:  Left DM foot infection with abscess  Afebrile  No leukocytosis  Bcxs on 9/29 negative  S/p L 4th toe amputation and I&D of multiloculated abscess on 9/29 with   [place]  OR cx +GBS, Strep Viridans and MSSE  Being followed by LPS  Continue IV Unasyn while inpatient  Anticipate 4 weeks IV Abx  Estimated end date 10/27  Place PICC    Uncontrolled diabetes  Hemoglobin A1c 8.3% on 9/30/18  Keep blood sugar <150 control infection and promote wound healing    Renal insufficiency-result  Renally adjust antibiotics as needed  Avoid nephrotoxins    Prognosis of limb salvage guarded      Discussed with [place].  Due to lack of insurance patient will likely complete IV antibiotic treatment inpatient.

## 2018-10-05 NOTE — PROCEDURES
Vascular Access Team     Date of Insertion: 10/5/18  Arm Circumference: 29.5cm  Internal length: 42cm  External Length: 2cm  Vein Occupancy %: 37%  Reason for PICC: Antibiotic Therapy  Labs: WBC 10.5, , INR not available, BUN 19, Cr 1.10, GFR >60    Consents confirmed, vessel patency confirmed with ultrasound. Risks and benefits of procedure explained to patient and education regarding central line associated bloodstream infections provided. Questions answered.     PICC placed in RUE per MD order with ultrasound guidance, initial arm circumference 29.5cm. 4 Fr, single lumen PICC placed in basilic vein after 1 attempt(s). 2 cc's of 1% lidocaine injected intradermally, 21 gauge microintroducer needle and modified Seldinger technique used. 44 cm catheter inserted with good blood return. Secured at 2 cm marker. Each lumen flushed without resistance with 10 mL 0.9% normal saline. PICC line secured with Biopatch and Tegaderm.     PICC placement is confirmed by nurse using 3CG technology. PICC line is appropriate for use at this time. Patient tolerated procedure well, without complications.  Patient condition relayed to unit RN or ordering physician via this post procedure note in the EMR.      [employer] PICC ref # [id number], Lot # [id number]

## 2018-10-05 NOTE — CARE PLAN
Problem: Pain Management  Goal: Pain level will decrease to patient's comfort goal  Pt denies need for pain medication. Reports pain only when ambulating. Resting in bed. Continue to assess.     Problem: Mobility  Goal: Risk for activity intolerance will decrease  Pt able to maintain heel weight bearing status well. Tolerates well.

## 2018-10-05 NOTE — PROGRESS NOTES
[facility] Medicine Daily Progress Note    Date of Service  10/5/2018    Chief Complaint  62 y.o. male with PMH significant for DM, HTN, and dyslipidemia admitted 9/29/2018 with redness swelling of left foot.  He dropped a piece of furniture on his left foot about 3 weeks ago.  About 1 week ago he noticed left fourth toe necrosis with associated foot swelling.     Hospital Course   He underwent left fourth toe amputation through MP joint and irrigation and debridement of multiloculated dorsal foot abscesses by Dr. Lopez on 9/29/2018. Wound culture (+) for Strep agalactiae (Group B).     Interval Problem Update  10/5 - POD #6  - plan for 4 weeks IV ABX per ID. PICC line placement today  10/4 - POD #5 - 0/10 pain at rest, 4-6/10 with ambulation  -left foot dressing intact with dried brown drainage  10/3 - POD #4 pain now 6/10 with ambulation and 0/10 at rest. He refuses any narcotics, only wants Tylenol or ASA  -left foot remains 1+ edema with ecchymosis and PPP. Wound draining brown thin fluid, non-odorous.  10/2 - POD #3 - ongoing 10/10 pain with ambulation. 3-4/10 at rest  -hemodynamically stable  -BM yesterday  -dressing changed yesterday  -plan for 2-4 weeks IV ABX per ID recommendations  -LEONEL resolved. DC IVF  -better blood glucose control after restarting home meds   10/1 - POD #2  -he reports 10/10 pain left foot when up ambulating with walker  -wound culture growing Streptococcus agalactiae (Group B) Heavy growth - ID consulted  -blood sugars 170-332 - restarted home meds and adjusted sliding scale. Diabetic Educator consult pending  -added Metoprolol today for better BP control    Consultants/Specialty  -Ortho  -LPS    Code Status  FULL    Disposition  Undetermined at this time.     Review of Systems  Review of Systems   Constitutional: Negative for chills and fever.   HENT: Negative.    Eyes: Negative.    Respiratory: Negative for cough, shortness of breath and wheezing.    Cardiovascular: Negative for  chest pain and palpitations.   Gastrointestinal: Negative for abdominal pain, constipation and diarrhea.   Genitourinary: Negative.    Musculoskeletal: Positive for joint pain (improving). Negative for back pain and falls.   Skin: Negative.    Neurological: Negative for dizziness and focal weakness.   Psychiatric/Behavioral: Negative.    All other systems reviewed and are negative.       Physical Exam  Temp:  [37.2 °C (99 °F)-37.5 °C (99.5 °F)] 37.5 °C (99.5 °F)  Pulse:  [78-86] 78  Resp:  [16-18] 18  BP: (140-144)/(79-84) 140/79    Physical Exam   Constitutional: He is oriented to person, place, and time. Vital signs are normal. He appears well-developed. He is sleeping. He is easily aroused.  Non-toxic appearance. He appears ill. No distress.   Offers no complaints.  Pain 0/10 at rest.   HENT:   Head: Normocephalic.   Right Ear: Hearing normal.   Left Ear: Hearing normal.   Nose: Nose normal.   Mouth/Throat: Oropharynx is clear and moist and mucous membranes are normal.   Eyes: Pupils are equal, round, and reactive to light. Conjunctivae, EOM and lids are normal. No scleral icterus.   Neck: Phonation normal. No JVD present.   Cardiovascular: Normal rate, normal heart sounds and normal pulses.    Pulses:       Dorsalis pedis pulses are 2+ on the right side, and 2+ on the left side.   1+ pedal/ankle edema LLE   Pulmonary/Chest: Effort normal and breath sounds normal. He has no wheezes. He has no rhonchi.   Good IS use   Abdominal: Soft. Normal appearance. He exhibits no distension. There is no tenderness. There is no rebound.   Musculoskeletal:        Feet:    Decreased ROM left ankle/foot due to pain   Neurological: He is oriented to person, place, and time and easily aroused. No cranial nerve deficit. GCS eye subscore is 4. GCS verbal subscore is 5. GCS motor subscore is 6.   Moves all extremities   Skin: Skin is warm and dry.   Left foot dressing CDI     Psychiatric: He has a normal mood and affect. His speech is  normal and behavior is normal. Judgment normal. Cognition and memory are normal.   Nursing note and vitals reviewed.      Fluids  No intake or output data in the 24 hours ending 10/05/18 1039    Laboratory  Recent Labs      10/02/18   1620  10/03/18   0447  10/05/18   0546   WBC  12.3*  10.6  10.5   RBC  4.10*  4.12*  3.94*   HEMOGLOBIN  13.1*  12.7*  12.5*   HEMATOCRIT  36.7*  36.8*  35.2*   MCV  89.5  89.3  89.3   MCH  32.0  30.8  31.7   MCHC  35.7*  34.5  35.5*   RDW  40.3  39.9  40.4   PLATELETCT  241  263  288   MPV  10.8  10.5  10.9     Recent Labs      10/02/18   1620  10/03/18   0447  10/05/18   0546   SODIUM  137  137  137   POTASSIUM  3.8  3.7  3.8   CHLORIDE  105  105  102   CO2  27  23  28   GLUCOSE  103*  115*  129*   BUN  23*  17  19   CREATININE  0.88  0.85  1.10   CALCIUM  8.4*  8.5  8.4*                   Imaging  DX-FOOT-COMPLETE 3+ LEFT   Final Result      1.  No radiographic evidence for osteomyelitis      2.  Gas associated with the lateral forefoot      DX-CHEST-PORTABLE (1 VIEW)   Final Result      Negative single view of the chest.      IR-PICC LINE PLACEMENT    (Results Pending)        Assessment/Plan  * Amputated toe of left foot (HCC)   Assessment & Plan    -4th toe by Dr. Lopez 9/29/2018  -PT/OT  -ID following - recommend 4 weeks IV ABX. PICC line placement pending  -DC plan uncertain at this time. SNF versus [facility]        Diabetic foot infection (HCC)- (present on admission)   Assessment & Plan    -Status post fourth toe amputation left foot on 9/29/2018 by orthopedic surgery  -Blood cultures negative  -wound culture Streptococcus agalactiae (Group B) Heavy growth -ID following  -LPS  -good glucose control         Essential hypertension- (present on admission)   Assessment & Plan    -stable on HCTZ, Lisinopril and Metoprolol                Dyslipidemia- (present on admission)   Assessment & Plan    -Home lovastatin        Type 2 diabetes mellitus with complication, without long-term  current use of insulin (HCC)- (present on admission)   Assessment & Plan    -better control now that he is back on his home meds  -continue ACHS accu-checks with [facility]  -continue home meds  -Diabetic Diet               VTE prophylaxis: Heparin    GUS Atkins

## 2018-10-05 NOTE — DOCUMENTATION QUERY
DOCUMENTATION QUERY    PROVIDERS: Please select “Cosign w/ note”to reply to query.    To better represent the severity of illness of your patient, please review the following information and exercise your independent professional judgment in responding to this query.     Acute osteomyelitis is documented in the Pathology Report. Based upon the clinical findings, risk factors, and treatment, can a diagnosis be provided to support this finding?    • Agree with Pathology finding of left fourth toe acute osteomyelitis  • Disagree with Pathology finding of left fourth toe acute osteomyelitis  • Findings of no clinical significance  • Other explanation of clinical findings  • Unable to determine    The medical record reflects the following:   Clinical Findings Per Pathology Report:   Left fourth toe:  Necrotic skin and soft tissue with abscess formation. Present at margin.  Acute osteomyelitis. Present at bony margin.    Culture:  left foot abscess: Streptococcus agalactiae (Group B) Heavy growth, Viridans Streptococcus Moderate growth, & Staphylococcus epidermidis Light growth    Per OR Report:   POSTOPERATIVE DIAGNOSES:  1.  Necrotic left fourth toe.  2.  Left dorsal foot abscess.   Treatment Orthopedics Consultation, surgical intervention, ID Consultation, Unasyn, Vanco, & keep blood sugar > 150   Risk Factors Diabetic foot infection, necrotic left fourth toe s/p amputation, uncontrolled DM with hyperglycemia, & essential HTN   Location within medical record History & Physical, Progress Notes, OR Report, Lab Results, & Pathology Report     Thank you,   Juana Haney RN  Clinical   Phone [phone]

## 2018-10-06 LAB
GLUCOSE BLD-MCNC: 110 MG/DL (ref 65–99)
GLUCOSE BLD-MCNC: 116 MG/DL (ref 65–99)
GLUCOSE BLD-MCNC: 139 MG/DL (ref 65–99)
GLUCOSE BLD-MCNC: 203 MG/DL (ref 65–99)

## 2018-10-06 PROCEDURE — 770006 HCHG ROOM/CARE - MED/SURG/GYN SEMI*

## 2018-10-06 PROCEDURE — 82962 GLUCOSE BLOOD TEST: CPT

## 2018-10-06 PROCEDURE — 700111 HCHG RX REV CODE 636 W/ 250 OVERRIDE (IP): Performed by: PHYSICIAN ASSISTANT

## 2018-10-06 PROCEDURE — 700111 HCHG RX REV CODE 636 W/ 250 OVERRIDE (IP): Performed by: HOSPITALIST

## 2018-10-06 PROCEDURE — 99232 SBSQ HOSP IP/OBS MODERATE 35: CPT | Performed by: INTERNAL MEDICINE

## 2018-10-06 PROCEDURE — 700102 HCHG RX REV CODE 250 W/ 637 OVERRIDE(OP): Performed by: NURSE PRACTITIONER

## 2018-10-06 PROCEDURE — 700105 HCHG RX REV CODE 258: Performed by: PHYSICIAN ASSISTANT

## 2018-10-06 PROCEDURE — A9270 NON-COVERED ITEM OR SERVICE: HCPCS | Performed by: NURSE PRACTITIONER

## 2018-10-06 PROCEDURE — 700105 HCHG RX REV CODE 258

## 2018-10-06 RX ORDER — SODIUM CHLORIDE 9 MG/ML
INJECTION, SOLUTION INTRAVENOUS
Status: COMPLETED
Start: 2018-10-06 | End: 2018-10-06

## 2018-10-06 RX ADMIN — METOPROLOL TARTRATE 25 MG: 25 TABLET, FILM COATED ORAL at 04:56

## 2018-10-06 RX ADMIN — METFORMIN HYDROCHLORIDE 850 MG: 850 TABLET ORAL at 08:25

## 2018-10-06 RX ADMIN — HEPARIN SODIUM 5000 UNITS: 5000 INJECTION, SOLUTION INTRAVENOUS; SUBCUTANEOUS at 04:59

## 2018-10-06 RX ADMIN — GLIMEPIRIDE 4 MG: 4 TABLET ORAL at 04:54

## 2018-10-06 RX ADMIN — HEPARIN SODIUM 5000 UNITS: 5000 INJECTION, SOLUTION INTRAVENOUS; SUBCUTANEOUS at 14:20

## 2018-10-06 RX ADMIN — METFORMIN HYDROCHLORIDE 850 MG: 850 TABLET ORAL at 17:11

## 2018-10-06 RX ADMIN — LISINOPRIL 30 MG: 10 TABLET ORAL at 04:55

## 2018-10-06 RX ADMIN — AMPICILLIN AND SULBACTAM 3 G: 2; 1 INJECTION, POWDER, FOR SOLUTION INTRAVENOUS at 04:58

## 2018-10-06 RX ADMIN — ASPIRIN 325 MG: 325 TABLET, COATED ORAL at 04:23

## 2018-10-06 RX ADMIN — AMPICILLIN AND SULBACTAM 3 G: 2; 1 INJECTION, POWDER, FOR SOLUTION INTRAVENOUS at 17:18

## 2018-10-06 RX ADMIN — HYDROCHLOROTHIAZIDE 12.5 MG: 12.5 TABLET ORAL at 04:55

## 2018-10-06 RX ADMIN — SODIUM CHLORIDE 500 ML: 9 INJECTION, SOLUTION INTRAVENOUS at 04:57

## 2018-10-06 RX ADMIN — METFORMIN HYDROCHLORIDE 850 MG: 850 TABLET ORAL at 13:06

## 2018-10-06 RX ADMIN — HEPARIN SODIUM 5000 UNITS: 5000 INJECTION, SOLUTION INTRAVENOUS; SUBCUTANEOUS at 21:12

## 2018-10-06 RX ADMIN — LOVASTATIN 40 MG: 20 TABLET ORAL at 04:56

## 2018-10-06 RX ADMIN — AMPICILLIN AND SULBACTAM 3 G: 2; 1 INJECTION, POWDER, FOR SOLUTION INTRAVENOUS at 12:06

## 2018-10-06 RX ADMIN — METOPROLOL TARTRATE 25 MG: 25 TABLET, FILM COATED ORAL at 17:11

## 2018-10-06 ASSESSMENT — ENCOUNTER SYMPTOMS
SHORTNESS OF BREATH: 0
PSYCHIATRIC NEGATIVE: 1
SENSORY CHANGE: 1
HEADACHES: 0
FOCAL WEAKNESS: 0
CHILLS: 0
CONSTIPATION: 0
DIZZINESS: 0
FEVER: 0
DIARRHEA: 0
PALPITATIONS: 0
EYES NEGATIVE: 1
NAUSEA: 0
FALLS: 0
ABDOMINAL PAIN: 0
MYALGIAS: 0
WHEEZING: 0
COUGH: 0
BACK PAIN: 0
VOMITING: 0

## 2018-10-06 ASSESSMENT — PAIN SCALES - GENERAL
PAINLEVEL_OUTOF10: 0
PAINLEVEL_OUTOF10: 4
PAINLEVEL_OUTOF10: 2
PAINLEVEL_OUTOF10: 0

## 2018-10-06 NOTE — PROGRESS NOTES
Received bedside report from nightshift RN.  Pt sitting up in chair, a/o x 4, verbalized no pain at this time.  Pt's  bed wheels locked and bed in lowest position.  Call light at pt's side in this chair.  Hourly rounding progressing.

## 2018-10-06 NOTE — PROGRESS NOTES
[facility] Medicine Daily Progress Note    Date of Service  10/6/2018    Chief Complaint  62 y.o. male with PMH significant for DM, HTN, and dyslipidemia admitted 9/29/2018 with redness swelling of left foot.  He dropped a piece of furniture on his left foot about 3 weeks ago.  About 1 week ago he noticed left fourth toe necrosis with associated foot swelling.     Hospital Course   He underwent left fourth toe amputation through MP joint and irrigation and debridement of multiloculated dorsal foot abscesses by Dr. Lopez on 9/29/2018. Wound culture (+) for Strep agalactiae (Group B).     Interval Problem Update  10/6 - PICC line placed yesterday  -4 weeks ABX per ID recommendations with estimate end date 10/27/18  10/5 - POD #6  - plan for 4 weeks IV ABX per ID. PICC line placement today  10/4 - POD #5 - 0/10 pain at rest, 4-6/10 with ambulation  -left foot dressing intact with dried brown drainage  10/3 - POD #4 pain now 6/10 with ambulation and 0/10 at rest. He refuses any narcotics, only wants Tylenol or ASA  -left foot remains 1+ edema with ecchymosis and PPP. Wound draining brown thin fluid, non-odorous.  10/2 - POD #3 - ongoing 10/10 pain with ambulation. 3-4/10 at rest  -hemodynamically stable  -BM yesterday  -dressing changed yesterday  -plan for 2-4 weeks IV ABX per ID recommendations  -LEONEL resolved. DC IVF  -better blood glucose control after restarting home meds   10/1 - POD #2  -he reports 10/10 pain left foot when up ambulating with walker  -wound culture growing Streptococcus agalactiae (Group B) Heavy growth - ID consulted  -blood sugars 170-332 - restarted home meds and adjusted sliding scale. Diabetic Educator consult pending  -added Metoprolol today for better BP control    Consultants/Specialty  -Ortho  -LPS    Code Status  FULL    Disposition  Undetermined at this time.     Review of Systems  Review of Systems   Constitutional: Negative for chills and fever.   HENT: Negative.    Eyes: Negative.     Respiratory: Negative for cough, shortness of breath and wheezing.    Cardiovascular: Positive for leg swelling (left foot). Negative for chest pain and palpitations.   Gastrointestinal: Negative for abdominal pain, constipation and diarrhea.   Genitourinary: Negative.    Musculoskeletal: Positive for joint pain (improving). Negative for back pain and falls.   Skin: Negative.    Neurological: Negative for dizziness and focal weakness.   Psychiatric/Behavioral: Negative.    All other systems reviewed and are negative.       Physical Exam  Temp:  [36.6 °C (97.8 °F)-36.8 °C (98.3 °F)] 36.8 °C (98.3 °F)  Pulse:  [68-82] 69  Resp:  [16-18] 18  BP: (121-164)/(67-92) 155/78    Physical Exam   Constitutional: He is oriented to person, place, and time. Vital signs are normal. He appears well-developed and well-nourished. He is cooperative.  Non-toxic appearance. No distress.   Offers no complaints.  Pain 0/10 at rest.   HENT:   Head: Normocephalic.   Right Ear: No decreased hearing is noted.   Left Ear: No decreased hearing is noted.   Nose: Nose normal.   Mouth/Throat: Oropharynx is clear and moist and mucous membranes are normal.   Eyes: Pupils are equal, round, and reactive to light. Conjunctivae, EOM and lids are normal. No scleral icterus.   Neck: Phonation normal. No JVD present.   Cardiovascular: Normal rate, normal heart sounds, intact distal pulses and normal pulses.    Pulses:       Dorsalis pedis pulses are 2+ on the right side, and 2+ on the left side.   1+ pedal/ankle edema LLE   Pulmonary/Chest: Effort normal and breath sounds normal. He has no rhonchi.   Good IS use   Abdominal: Soft. Normal appearance. He exhibits no distension. There is no tenderness. There is no rebound.   Musculoskeletal:        Feet:    Decreased ROM left ankle/foot due to pain   Neurological: He is alert and oriented to person, place, and time. No cranial nerve deficit. GCS eye subscore is 4. GCS verbal subscore is 5. GCS motor  subscore is 6.   Moves all extremities   Skin: Skin is warm and dry.   Left foot dressing CDI     Psychiatric: He has a normal mood and affect. His speech is normal and behavior is normal. Judgment normal. Cognition and memory are normal.   Nursing note and vitals reviewed.      Fluids    Intake/Output Summary (Last 24 hours) at 10/06/18 1351  Last data filed at 10/06/18 0500   Gross per 24 hour   Intake                0 ml   Output              750 ml   Net             -750 ml       Laboratory  Recent Labs      10/05/18   0546   WBC  10.5   RBC  3.94*   HEMOGLOBIN  12.5*   HEMATOCRIT  35.2*   MCV  89.3   MCH  31.7   MCHC  35.5*   RDW  40.4   PLATELETCT  288   MPV  10.9     Recent Labs      10/05/18   0546   SODIUM  137   POTASSIUM  3.8   CHLORIDE  102   CO2  28   GLUCOSE  129*   BUN  19   CREATININE  1.10   CALCIUM  8.4*                   Imaging  IR-PICC LINE PLACEMENT   Final Result                  Ultrasound-guided PICC placement performed by qualified nursing staff as    above.          DX-FOOT-COMPLETE 3+ LEFT   Final Result      1.  No radiographic evidence for osteomyelitis      2.  Gas associated with the lateral forefoot      DX-CHEST-PORTABLE (1 VIEW)   Final Result      Negative single view of the chest.           Assessment/Plan  * Amputated toe of left foot (HCC)   Assessment & Plan    -4th toe by Dr. Lopez 9/29/2018  -PT/OT  -ID following - recommend 4 weeks IV ABX.   -PICC line  -DC plan uncertain at this time. SNF versus [facility]        Diabetic foot infection (HCC)- (present on admission)   Assessment & Plan    -Status post fourth toe amputation left foot on 9/29/2018 by orthopedic surgery  -Blood cultures negative   -Pathology report left fourth toe acute osteomyelitis  -wound culture Streptococcus agalactiae (Group B) Heavy growth -ID following - recommend 4 weeks IV ABX - estimated end date 10/27/18  -LPS  -good glucose control         Essential hypertension- (present on admission)   Assessment  & Plan    -stable on HCTZ, Lisinopril and Metoprolol                Dyslipidemia- (present on admission)   Assessment & Plan    -Home lovastatin        Type 2 diabetes mellitus with complication, without long-term current use of insulin (HCC)- (present on admission)   Assessment & Plan    -better control now that he is back on his home meds  -continue ACHS accu-checks with [facility]  -continue home meds  -Diabetic Diet               VTE prophylaxis: Heparin    CARLOS Atkins.

## 2018-10-06 NOTE — CARE PLAN
Problem: Pain Management  Goal: Pain level will decrease to patient's comfort goal  Outcome: PROGRESSING AS EXPECTED  Patient denies pain at this time. Pain controlled with reposition and limited use of the foot.    Problem: Mobility  Goal: Risk for activity intolerance will decrease  Outcome: PROGRESSING AS EXPECTED  Pt is up and walking with assistance and a walker to the bathroom and around the room.

## 2018-10-06 NOTE — WOUND TEAM
"[facility]  Inpatient Services  Initial Wound and Skin Care Evaluation    Admission Date:  9/29/2018   HPI, PMH, SH: Reviewed  Unit where seen by Wound Team: [id number]    WOUND CONSULT RELATED TO:  Blisters around left foot surgical incision.     SUBJECTIVE:  \"Thank you. Can I have some milk?\"\"      Self Report / Pain Level:  0/10.       OBJECTIVE:  Agreeable, calm.    WOUND TYPE, LOCATION, CHARACTERISTICS (Pressure ulcers: location, stage, POA or date identified)    Location and type of wound:Left foot, surgical incision with 4th digit amputation.         Periwound:    Areas of denuded skin, maceration especially on toes.      Drainage:     Small, serosanguinous.     Tissue Type and %:    100% red     Wound Edges:   Approximated with sutures.  Odor:     None  Exposed structure(s):  None  S&S of Infection:   None      Measurements: NM  Length:      Width:        Depth:       Tracts/Undermining:      Vascular:    Dorsal Pedal pulses:  Not palpated  Posterior tib pulses:  Not palpated    CHRISTIAN:     Not applying compression.    Lab Values:    WBC:       WBC   Date/Time Value Ref Range Status   10/05/2018 05:46 AM 10.5 4.8 - 10.8 K/uL Final     AIC:      Lab Results   Component Value Date/Time    HBA1C 8.3 (H) 09/30/2018 07:46 AM         Culture:   Completed Not needed at this time.    INTERVENTIONS BY WOUND TEAM:  Removed dressing. Cleaned wound with saline and gauze. A folded piece of hydrofiber silver placed over toe amp portion, then a piece of hydrofiber silver over dorsal incision big enough to cover denuded area. All of this was covered with nonadhesive foam, secured with hypafix.     Dressing selection:  See above.         Interdisciplinary consultation:  RN, patient, Vasiliy Chow with LPS.     EVALUATION:  Hydrofiber silver provides topical antimicrobial treatment and absorption of exudate without laterally wicking to periwound.       Factors affecting wound healing:  Diabetes, infection.  Goals:  " Steady decrease in wound area and depth weekly     NURSING PLAN OF CARE ORDERS (X):    Dressing changes: See Dressing Care orders:   X  Skin care: See Skin Care orders:   Rectal tube care: See Rectal Tube Care orders:   Other orders:    RSKIN: CURRENT (X) ORDERED (O)  Q shift Stewart:  X  Q shift pressure point assessments:  X  Pressure redistribution mattress   X     Waffle overlay  SHIVA      Bariatric SHIVA      Bariatric foam        Heel float boots       Heels floated on pillows    X  Barrier wipes      Barrier Cream      Barrier paste      Sacral silicone dressing      Silicone O2 tubing      Anchorfast      Trach with Optifoam split foam       Waffle cushion      Rectal tube or BMS      Antifungal tx    Turn q 2 hours   Patient turns self.  Up to chair     Ambulate   PT/OT     Dietician      PO   X  TF   TPN   NPO   # days   Other       WOUND TEAM PLAN OF CARE (X):   NPWT change 3 x week:        Dressing changes by wound team:       Follow up as needed:   X    Other (explain): X followed by LPS>    Anticipated discharge plans (X):  SNF:           Home Care:           Outpatient Wound Center:            Self Care:            Other:         To be determined.

## 2018-10-06 NOTE — PROGRESS NOTES
Infectious Disease Progress Note    Author: GUS Castro Date & Time of service: 10/6/2018  12:39 PM    Chief Complaint:  Left DM foot infection  Left foot abscess    Interval History:  62 y.o. Male with Left foot DM foot infection with abscess s/p 4th toe amputation and I&D.  10/2- Tm 99.7, no WBC, 7/10 L foot pain- improved with rest and pain meds, no issue with abx.  10/3- AF, WBC 10.6, Left foot sore- better than yesterday, tolerating abx.  10/4- AF, no WBC, sleeping- easily aroused, no issue with abx, generalized tenderness to L foot, had a HA this AM that is slowly resolving.  10/5-T-max 99, WBC 10.5, denies any pain to left foot, resting in bed sleeping-easily arousable, tolerating antibiotics.  10/6- Tm 99.5, no WBC, L  after ambulating, no issue with abx.  Labs Reviewed, Medications Reviewed, Radiology Reviewed and Wound Reviewed.    Review of Systems:  Review of Systems   Constitutional: Negative for chills, fever and malaise/fatigue.   HENT: Negative for hearing loss.    Respiratory: Negative for shortness of breath.    Cardiovascular: Positive for leg swelling. Negative for chest pain.   Gastrointestinal: Negative for abdominal pain, constipation, diarrhea, nausea and vomiting.   Genitourinary: Negative for dysuria.   Musculoskeletal: Positive for joint pain. Negative for myalgias.   Skin: Negative for rash.   Neurological: Positive for sensory change. Negative for headaches.       Hemodynamics:  Temp (24hrs), Av.7 °C (98.1 °F), Min:36.6 °C (97.8 °F), Max:36.8 °C (98.3 °F)  Temperature: 36.8 °C (98.3 °F)  Pulse  Av.2  Min: 65  Max: 121   Blood Pressure: 155/78       Physical Exam:  Physical Exam   Constitutional: He is oriented to person, place, and time. He appears well-developed and well-nourished. No distress.   HENT:   Head: Normocephalic and atraumatic.   Eyes: Pupils are equal, round, and reactive to light. Conjunctivae and EOM are normal.   Neck: Normal range of  motion. Neck supple. No tracheal deviation present.   Cardiovascular: Normal rate, regular rhythm, normal heart sounds and intact distal pulses.    No murmur heard.  Pulmonary/Chest: Effort normal and breath sounds normal. No respiratory distress. He has no wheezes.   Abdominal: Soft. Bowel sounds are normal. He exhibits no distension. There is no tenderness.   Musculoskeletal: He exhibits edema and tenderness.   Left 4th toe amp site-sutures in place, moderate maceration, moderate to heavy serosanguineous drainage without odor, mild erythema to surrounding tissue.    RUE PICC- CDI, non tender, no erythema.   Neurological: He is alert and oriented to person, place, and time.   Skin: Skin is warm. There is erythema.   Psychiatric: He has a normal mood and affect. His behavior is normal.   Nursing note and vitals reviewed.      Meds:    Current Facility-Administered Medications:   •  insulin regular **AND** Accu-Chek ACHS **AND** NOTIFY MD and PharmD **AND** glucose 4 g **AND** dextrose 50%  •  aspirin  •  lisinopril  •  glimepiride  •  metFORMIN  •  metoprolol  •  lovastatin  •  hydroCHLOROthiazide  •  senna-docusate **AND** polyethylene glycol/lytes **AND** magnesium hydroxide **AND** bisacodyl  •  heparin  •  ondansetron  •  ondansetron  •  promethazine  •  promethazine  •  prochlorperazine  •  acetaminophen  •  oxyCODONE immediate-release  •  ampicillin-sulbactam (UNASYN) IV    Labs:  Recent Labs      10/05/18   0546   WBC  10.5   RBC  3.94*   HEMOGLOBIN  12.5*   HEMATOCRIT  35.2*   MCV  89.3   MCH  31.7   RDW  40.4   PLATELETCT  288   MPV  10.9     Recent Labs      10/05/18   0546   SODIUM  137   POTASSIUM  3.8   CHLORIDE  102   CO2  28   GLUCOSE  129*   BUN  19     Recent Labs      10/05/18   0546   CREATININE  1.10       Imaging:  No recent imaging.      Micro:  Results     Procedure Component Value Units Date/Time    CULTURE WOUND W/ GRAM STAIN [[id number]]  (Abnormal)  (Susceptibility) Collected:  09/29/18  1700    Order Status:  Completed Specimen:  Wound Updated:  10/05/18 1530     Significant Indicator POS (POS)     Source WND     Site Left Foot Abscess     Culture Result Wound -- (A)     Gram Stain Result Moderate WBCs.  Moderate Gram positive cocci.  Rare Gram negative rods.       Culture Result Wound Streptococcus agalactiae (Group B)  Heavy growth   (A)      Viridans Streptococcus  Moderate growth   (A)      Staphylococcus epidermidis  Light growth   (A)    Culture & Susceptibility     STAPHYLOCOCCUS EPIDERMIDIS     Antibiotic Sensitivity Microscan Unit Status    Ampicillin/sulbactam Sensitive <=8/4 mcg/mL Final    Method: SENSITIVITY, SHAKA    Clindamycin Sensitive <=0.5 mcg/mL Final    Method: SENSITIVITY, SHAKA    Daptomycin Sensitive <=0.5 mcg/mL Final    Method: SENSITIVITY, SHAKA    Erythromycin Resistant >4 mcg/mL Final    Method: SENSITIVITY, SHAKA    Moxifloxacin Sensitive <=0.5 mcg/mL Final    Method: SENSITIVITY, SHAKA    Oxacillin Sensitive <=0.25 mcg/mL Final    Method: SENSITIVITY, SHAKA    Penicillin Resistant 8 mcg/mL Final    Method: SENSITIVITY, SHAKA    Tetracycline Intermediate 8 mcg/mL Final    Method: SENSITIVITY, SHAKA    Trimeth/Sulfa Sensitive <=0.5/9.5 mcg/mL Final    Method: SENSITIVITY, SHAKA    Vancomycin Sensitive 2 mcg/mL Final    Method: SENSITIVITY, SHAKA                       ANAEROBIC CULTURE [[id number]]  (Abnormal) Collected:  09/29/18 1700    Order Status:  Completed Specimen:  Wound Updated:  10/05/18 1530     Significant Indicator POS (POS)     Source WND     Site Left Foot Abscess     Anaerobic Culture, Culture Res Growth noted after further incubation, see below for  organism identification.   (A)      Bacteroides vulgatus  Light growth   (A)    FUNGAL CULTURE [[id number]] Collected:  09/29/18 1700    Order Status:  Completed Specimen:  Wound Updated:  10/05/18 1530     Significant Indicator NEG     Source WND     Site Left Foot Abscess     Fungal Culture Culture in progress.    AFB CULTURE  "[[id number]] Collected:  09/29/18 1700    Order Status:  Completed Specimen:  Wound Updated:  10/05/18 1530     Significant Indicator NEG     Source WND     Site Left Foot Abscess     AFB Culture Culture in progress.  NOTE:  Swabs are not recommended for the isolation of  Mycobacteria, since they provide limited material.  They  are acceptable only if a specimen cannot be collected by  any other means.  Negative results obtained from these  specimens submitted on swabs are not reliable.       AFB Smear Results No acid fast bacilli seen.    BLOOD CULTURE [[id number]] Collected:  09/29/18 1228    Order Status:  Completed Specimen:  Blood from Peripheral Updated:  10/04/18 1500     Significant Indicator NEG     Source BLD     Site PERIPHERAL     Blood Culture No growth after 5 days of incubation.    Narrative:       Per Hospital Policy: Only change Specimen Src: to \"Line\" if  specified by physician order.    BLOOD CULTURE [[id number]] Collected:  09/29/18 1243    Order Status:  Completed Specimen:  Blood from Peripheral Updated:  10/04/18 1500     Significant Indicator NEG     Source BLD     Site PERIPHERAL     Blood Culture No growth after 5 days of incubation.    Narrative:       Per Hospital Policy: Only change Specimen Src: to \"Line\" if  specified by physician order.    URINE CULTURE(NEW) [[id number]] Collected:  09/30/18 1440    Order Status:  Completed Specimen:  Urine from Urine, Clean Catch Updated:  10/02/18 0931     Significant Indicator NEG     Source UR     Site URINE, CLEAN CATCH     Urine Culture No growth at 48 hours.    Narrative:       Collected By:[id number] QUYEN CUNNINGHAM  Indication for culture:->Emergency Room Patient    GRAM STAIN [[id number]] Collected:  09/29/18 1700    Order Status:  Completed Specimen:  Wound Updated:  09/30/18 1751     Significant Indicator .     Source WND     Site Left Foot Abscess     Gram Stain Result Moderate WBCs.  Moderate Gram positive cocci.  Rare Gram negative rods.      ACID " FAST STAIN [[id number]] Collected:  09/29/18 1700    Order Status:  Completed Specimen:  Wound Updated:  09/30/18 1751     Significant Indicator NEG     Source WND     Site Left Foot Abscess     AFB Smear Results No acid fast bacilli seen.    URINALYSIS [[id number]]  (Abnormal) Collected:  09/30/18 1440    Order Status:  Completed Specimen:  Urine from Urine, Clean Catch Updated:  09/30/18 1454     Color DK Yellow     Character Clear     Specific Gravity 1.026     Ph 5.0     Glucose >=1000 (A) mg/dL      Ketones Trace (A) mg/dL      Protein 100 (A) mg/dL      Bilirubin Negative     Urobilinogen, Urine 1.0     Nitrite Negative     Leukocyte Esterase Negative     Occult Blood Negative     Micro Urine Req Microscopic    Narrative:       Collected By:[id number] QUYEN CUNNINGHAM  Indication for culture:->Emergency Room Patient          Assessment:  Active Hospital Problems    Diagnosis   • *Amputated toe of left foot ([facility]) [Z89.422]   • Diabetic foot infection ([facility]) [E11.628, L08.9]   • Type 2 diabetes mellitus with complication, without long-term current use of insulin ([facility]) [E11.8]   • Sepsis ([facility]) [A41.9]   Left foot abscess    Plan:  Left DM foot infection with abscess  Afebrile  No leukocytosis on last check  Bcxs on 9/29 negative  S/p L 4th toe amputation and I&D of multiloculated abscess on 9/29 with Dr. Lopez  OR cx +GBS, Strep Viridans and MSSE  Being followed by LPS  Continue IV Unasyn while inpatient  Anticipate 4 weeks IV Abx  Estimated end date 10/27/18    Uncontrolled diabetes  Hemoglobin A1c 8.3% on 9/30/18  Keep blood sugar <150 control infection and promote wound healing    Renal insufficiency-resolved  Renally adjust antibiotics as needed  Avoid nephrotoxins      Prognosis of limb salvage guarded    Due to lack of insurance, patient will need to complete IV antibiotic treatment inpatient.

## 2018-10-07 LAB
GLUCOSE BLD-MCNC: 118 MG/DL (ref 65–99)
GLUCOSE BLD-MCNC: 126 MG/DL (ref 65–99)
GLUCOSE BLD-MCNC: 134 MG/DL (ref 65–99)
GLUCOSE BLD-MCNC: 160 MG/DL (ref 65–99)

## 2018-10-07 PROCEDURE — 82962 GLUCOSE BLOOD TEST: CPT | Mod: 91

## 2018-10-07 PROCEDURE — 700111 HCHG RX REV CODE 636 W/ 250 OVERRIDE (IP): Performed by: PHYSICIAN ASSISTANT

## 2018-10-07 PROCEDURE — 99232 SBSQ HOSP IP/OBS MODERATE 35: CPT | Performed by: INTERNAL MEDICINE

## 2018-10-07 PROCEDURE — 700102 HCHG RX REV CODE 250 W/ 637 OVERRIDE(OP): Performed by: NURSE PRACTITIONER

## 2018-10-07 PROCEDURE — 700111 HCHG RX REV CODE 636 W/ 250 OVERRIDE (IP): Performed by: HOSPITALIST

## 2018-10-07 PROCEDURE — 770006 HCHG ROOM/CARE - MED/SURG/GYN SEMI*

## 2018-10-07 PROCEDURE — 700105 HCHG RX REV CODE 258: Performed by: PHYSICIAN ASSISTANT

## 2018-10-07 PROCEDURE — A9270 NON-COVERED ITEM OR SERVICE: HCPCS | Performed by: NURSE PRACTITIONER

## 2018-10-07 RX ADMIN — METOPROLOL TARTRATE 25 MG: 25 TABLET, FILM COATED ORAL at 05:31

## 2018-10-07 RX ADMIN — HEPARIN SODIUM 5000 UNITS: 5000 INJECTION, SOLUTION INTRAVENOUS; SUBCUTANEOUS at 13:53

## 2018-10-07 RX ADMIN — AMPICILLIN AND SULBACTAM 3 G: 2; 1 INJECTION, POWDER, FOR SOLUTION INTRAVENOUS at 05:28

## 2018-10-07 RX ADMIN — GLIMEPIRIDE 4 MG: 4 TABLET ORAL at 05:32

## 2018-10-07 RX ADMIN — METOPROLOL TARTRATE 25 MG: 25 TABLET, FILM COATED ORAL at 16:56

## 2018-10-07 RX ADMIN — INSULIN HUMAN 2 UNITS: 100 INJECTION, SOLUTION PARENTERAL at 10:33

## 2018-10-07 RX ADMIN — METFORMIN HYDROCHLORIDE 850 MG: 850 TABLET ORAL at 13:52

## 2018-10-07 RX ADMIN — METFORMIN HYDROCHLORIDE 850 MG: 850 TABLET ORAL at 16:56

## 2018-10-07 RX ADMIN — LISINOPRIL 30 MG: 10 TABLET ORAL at 05:32

## 2018-10-07 RX ADMIN — AMPICILLIN AND SULBACTAM 3 G: 2; 1 INJECTION, POWDER, FOR SOLUTION INTRAVENOUS at 16:56

## 2018-10-07 RX ADMIN — AMPICILLIN AND SULBACTAM 3 G: 2; 1 INJECTION, POWDER, FOR SOLUTION INTRAVENOUS at 00:02

## 2018-10-07 RX ADMIN — LOVASTATIN 40 MG: 20 TABLET ORAL at 05:31

## 2018-10-07 RX ADMIN — AMPICILLIN AND SULBACTAM 3 G: 2; 1 INJECTION, POWDER, FOR SOLUTION INTRAVENOUS at 12:53

## 2018-10-07 RX ADMIN — HEPARIN SODIUM 5000 UNITS: 5000 INJECTION, SOLUTION INTRAVENOUS; SUBCUTANEOUS at 05:29

## 2018-10-07 RX ADMIN — METFORMIN HYDROCHLORIDE 850 MG: 850 TABLET ORAL at 09:34

## 2018-10-07 RX ADMIN — HEPARIN SODIUM 5000 UNITS: 5000 INJECTION, SOLUTION INTRAVENOUS; SUBCUTANEOUS at 20:20

## 2018-10-07 RX ADMIN — HYDROCHLOROTHIAZIDE 12.5 MG: 12.5 TABLET ORAL at 05:31

## 2018-10-07 ASSESSMENT — PAIN SCALES - GENERAL
PAINLEVEL_OUTOF10: 0

## 2018-10-07 ASSESSMENT — ENCOUNTER SYMPTOMS
DIARRHEA: 0
ABDOMINAL PAIN: 0
PALPITATIONS: 0
PSYCHIATRIC NEGATIVE: 1
MYALGIAS: 0
WEAKNESS: 0
COUGH: 0
SHORTNESS OF BREATH: 0
SENSORY CHANGE: 1
FEVER: 0
VOMITING: 0
FOCAL WEAKNESS: 0
CONSTIPATION: 0
HEADACHES: 0
EYES NEGATIVE: 1
BACK PAIN: 0
FALLS: 0
WHEEZING: 0
CHILLS: 0
NAUSEA: 0
DIZZINESS: 0

## 2018-10-07 NOTE — PROGRESS NOTES
Pt is AAOx4  Pt denies any pain at this time  VS WNL  Dressing CDI  POC discussed  All needs met at this time  Bed in low position  Call light within reach  Rounding in place

## 2018-10-07 NOTE — CARE PLAN
Problem: Bowel/Gastric:  Goal: Normal bowel function is maintained or improved  Pt maintaining normal bowel movements.    Problem: Mobility  Goal: Risk for activity intolerance will decrease  Pt ambulates the quarles frequently.  Pt has a steady gate.  Pt wearing SCD when in bed or in the chair

## 2018-10-07 NOTE — PROGRESS NOTES
[facility] Medicine Daily Progress Note    Date of Service  10/7/2018    Chief Complaint  62 y.o. male with PMH significant for DM, HTN, and dyslipidemia admitted 9/29/2018 with redness swelling of left foot.  He dropped a piece of furniture on his left foot about 3 weeks ago.  About 1 week ago he noticed left fourth toe necrosis with associated foot swelling.     Hospital Course   He underwent left fourth toe amputation through MP joint and irrigation and debridement of multiloculated dorsal foot abscesses by Dr. Lopez on 9/29/2018. Wound culture (+) for Strep agalactiae (Group B).     Interval Problem Update  10/7 - POD #8  -ambulating in the halls with walker  -reports good pain control without narcotic use  10/6 - PICC line placed yesterday  -4 weeks ABX per ID recommendations with estimate end date 10/27/18  10/5 - POD #6  - plan for 4 weeks IV ABX per ID. PICC line placement today  10/4 - POD #5 - 0/10 pain at rest, 4-6/10 with ambulation  -left foot dressing intact with dried brown drainage  10/3 - POD #4 pain now 6/10 with ambulation and 0/10 at rest. He refuses any narcotics, only wants Tylenol or ASA  -left foot remains 1+ edema with ecchymosis and PPP. Wound draining brown thin fluid, non-odorous.  10/2 - POD #3 - ongoing 10/10 pain with ambulation. 3-4/10 at rest  -hemodynamically stable  -BM yesterday  -dressing changed yesterday  -plan for 2-4 weeks IV ABX per ID recommendations  -LEONEL resolved. DC IVF  -better blood glucose control after restarting home meds   10/1 - POD #2  -he reports 10/10 pain left foot when up ambulating with walker  -wound culture growing Streptococcus agalactiae (Group B) Heavy growth - ID consulted  -blood sugars 170-332 - restarted home meds and adjusted sliding scale. Diabetic Educator consult pending  -added Metoprolol today for better BP control    Consultants/Specialty  -Ortho  -LPS    Code Status  FULL    Disposition  Undetermined at this time.     Review of  Systems  Review of Systems   Constitutional: Negative for chills and fever.   HENT: Negative.    Eyes: Negative.    Respiratory: Negative for cough, shortness of breath and wheezing.    Cardiovascular: Positive for leg swelling (left foot). Negative for chest pain and palpitations.   Gastrointestinal: Negative for abdominal pain, constipation and diarrhea.   Genitourinary: Negative.    Musculoskeletal: Positive for joint pain (improving). Negative for back pain and falls.   Skin: Negative.    Neurological: Negative for dizziness and focal weakness.   Psychiatric/Behavioral: Negative.    All other systems reviewed and are negative.       Physical Exam  Temp:  [36.7 °C (98 °F)-36.7 °C (98.1 °F)] 36.7 °C (98.1 °F)  Pulse:  [74-78] 78  Resp:  [16-18] 16  BP: (136-144)/(81-87) 144/87    Physical Exam   Constitutional: He is oriented to person, place, and time. Vital signs are normal. He appears well-developed and well-nourished. He is cooperative.  Non-toxic appearance. No distress.   Offers no complaints.  Pain 0/10 at rest.   HENT:   Head: Normocephalic.   Right Ear: Hearing normal.   Left Ear: Hearing normal.   Nose: Nose normal.   Mouth/Throat: Oropharynx is clear and moist and mucous membranes are normal.   Eyes: Pupils are equal, round, and reactive to light. Conjunctivae, EOM and lids are normal. No scleral icterus.   Neck: Phonation normal. No JVD present.   Cardiovascular: Normal rate, normal heart sounds, intact distal pulses and normal pulses.    Pulses:       Dorsalis pedis pulses are 2+ on the right side, and 2+ on the left side.   1+ pedal/ankle edema LLE   Pulmonary/Chest: Effort normal and breath sounds normal. He has no rhonchi.   Good IS use   Abdominal: Soft. Normal appearance. He exhibits no distension. There is no tenderness. There is no rebound.   Musculoskeletal:        Feet:    Decreased ROM left ankle/foot due to pain   Neurological: He is alert and oriented to person, place, and time. No cranial  nerve deficit. GCS eye subscore is 4. GCS verbal subscore is 5. GCS motor subscore is 6.   Moves all extremities   Skin: Skin is warm and dry.   Left foot dressing CDI     Psychiatric: He has a normal mood and affect. His speech is normal and behavior is normal. Judgment normal. Cognition and memory are normal.   Nursing note and vitals reviewed.      Fluids  No intake or output data in the 24 hours ending 10/07/18 1213    Laboratory  Recent Labs      10/05/18   0546   WBC  10.5   RBC  3.94*   HEMOGLOBIN  12.5*   HEMATOCRIT  35.2*   MCV  89.3   MCH  31.7   MCHC  35.5*   RDW  40.4   PLATELETCT  288   MPV  10.9     Recent Labs      10/05/18   0546   SODIUM  137   POTASSIUM  3.8   CHLORIDE  102   CO2  28   GLUCOSE  129*   BUN  19   CREATININE  1.10   CALCIUM  8.4*                   Imaging  IR-PICC LINE PLACEMENT   Final Result                  Ultrasound-guided PICC placement performed by qualified nursing staff as    above.          DX-FOOT-COMPLETE 3+ LEFT   Final Result      1.  No radiographic evidence for osteomyelitis      2.  Gas associated with the lateral forefoot      DX-CHEST-PORTABLE (1 VIEW)   Final Result      Negative single view of the chest.           Assessment/Plan  * Amputated toe of left foot (HCC)   Assessment & Plan    -4th toe by Dr. Lopez 9/29/2018  -PT/OT  -ID following - recommend 4 weeks IV ABX.   -PICC line  -DC plan uncertain at this time        Diabetic foot infection (HCC)- (present on admission)   Assessment & Plan    -Status post fourth toe amputation left foot on 9/29/2018 by orthopedic surgery  -Blood cultures negative   -Pathology report left fourth toe acute osteomyelitis  -wound culture Streptococcus agalactiae (Group B) Heavy growth -ID following - recommend 4 weeks IV ABX - estimated end date 10/27/18  -LPS  -good glucose control         Essential hypertension- (present on admission)   Assessment & Plan    -stable on HCTZ, Lisinopril and Metoprolol                 Dyslipidemia- (present on admission)   Assessment & Plan    -Home lovastatin        Type 2 diabetes mellitus with complication, without long-term current use of insulin (HCC)- (present on admission)   Assessment & Plan    -better control now that he is back on his home meds  -continue ACHS accu-checks with [facility]  -continue home meds  -Diabetic Diet               VTE prophylaxis: Heparin    CARLOS Atkins.

## 2018-10-07 NOTE — PROGRESS NOTES
" LIMB PRESERVATION SERVICE  HPI: Sumit Estrada is a 62 y.o. male, with a past medical history that includes uncontrolled type 2 diabetes and HTN, admitted 9/29/2018 for Sepsis (HCC).   LPS has been consulted for post op care of his amp site of the Left 4th toe.This wound started in early September of 2018 when some furniture was dropped on his left foot. Then a few days prior to admission the pt noticed severe swelling and the left fourth toe turning black.    The pt was only using ice on the wound.  IV antibiotics were started on this admission.  Infectious diseases has been consulted.    Xray has been done  and did show gas consistent with active infection  MRI has not been done  Ortho was consulted and Dr Lopez preformed the amputation  Pt was diagnosed with type 2 diabetes 20 years ago, and is currently managing with oral meds.  Pt checks their blood sugars every other day and reports that these typically run around the high 100s.  They have not had previous diabetes education.  They do have numbness in his feet.  They usually wears work shoes. They do not check their feet routinely. They have not had previous foot ulcers or foot surgery.  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     ASSESSMENT: Procedure(s):  IRRIGATION & DEBRIDEMENT ORTHO - Wound Class: Dirty or Infected with Dr. Lopez 9/29/2018    /87   Pulse 78   Temp 36.7 °C (98.1 °F)   Resp 16   Ht 1.74 m (5' 8.5\")   Wt 78.1 kg (172 lb 2.9 oz)   SpO2 97%   BMI 25.80 kg/m²     Wound Characteristics           Location:  Initial Evaluation  Date:10/7/2018   Tissue Type and %: 100% Red   Periwound: Macerated, Edematous, Erythema   Drainage: Scant Serosanginous   Exposed structures Sutures   Wound Edges:   Attached 100% Approximated   Odor: None   S&S of Infection:   None   Edema: Localized at Site   Sensation: Insensate                       DIABETES MANAGEMENT:  Lab Results   Component Value Date/Time    GLUCOSE 129 (H) " 10/05/2018 05:46 AM      Lab Results   Component Value Date/Time    HBA1C 8.3 (H) 09/30/2018 07:46 AM        Diabetes education Seen 10/1/18    INFECTION MANAGEMENT:  WBC   Date/Time Value Ref Range Status   10/05/2018 05:46 AM 10.5 4.8 - 10.8 K/uL Final       Microbiology:     CULTURE WOUND W/ GRAM STAIN [[id number]]  (Abnormal)  (Susceptibility) Collected:  09/29/18 1700   Order Status:  Completed Specimen:  Wound Updated:  10/05/18 1530    Significant Indicator POS (POS)    Source WND    Site Left Foot Abscess    Culture Result Wound -- (A)    Gram Stain Result Moderate WBCs.  Moderate Gram positive cocci.  Rare Gram negative rods.      Culture Result Wound Streptococcus agalactiae (Group B)  Heavy growth   (A)     Viridans Streptococcus  Moderate growth   (A)     Staphylococcus epidermidis  Light growth   (A)       PLAN:    Wound care: Left Foot - 4th Digit Amp site Macerated - Changed wound care orders    NURSING TO CHANGE LEFT FOOT SURGICAL SITE DRESSING EVERY DAY HOURS AND PRN FOR SATURATION OR DISLODGEMENT  Nursing to cleanse wound/periwound with Normal Saline (NS).  Pat periwound dry. Apply a piece of AqAg (Hydrofiber Silver), cut to size, over suture sites. Cover with Non Adhesive Foam and secure with Roll Gauze.  Then apply elastic bandage to secure dressings in place.   Please take Weekly Wound Photos. Notify wound team if wound deteriorates or fails to progress.    Nursing to change Left Foot Daily and PRN for Saturation or Dislodgement  Wound Care by Nursing, LPS to Follow.      Offloading: Offloading boot  when ambulating    Antibiotics: Per ID recommendation 4 Weeks IV end 10/27 Unasyn    Surgery: NA    DISCHARGE PLAN:    Disposition:     Follow-up: LPS rounds in [facility]ound Clinic 10/19/18    Other:       Vasiliy Chow R.N.

## 2018-10-07 NOTE — PROGRESS NOTES
Infectious Disease Progress Note    Author: GUS Castro Date & Time of service: 10/7/2018  10:55 AM    Chief Complaint:  Left DM foot infection  Left foot abscess    Interval History:  62 y.o. Male with Left foot DM foot infection with abscess s/p 4th toe amputation and I&D.  10/2- Tm 99.7, no WBC, 7/10 L foot pain- improved with rest and pain meds, no issue with abx.  10/3- AF, WBC 10.6, Left foot sore- better than yesterday, tolerating abx.  10/4- AF, no WBC, sleeping- easily aroused, no issue with abx, generalized tenderness to L foot, had a HA this AM that is slowly resolving.  10/5-T-max 99, WBC 10.5, denies any pain to left foot, resting in bed sleeping-easily arousable, tolerating antibiotics.  10/6- Tm 99.5, no WBC, L  after ambulating, no issue with abx.  10/7-AF, no WBC, tolerating antibiotics, denies any left foot pain, reports ambulating around the hallways without issue.  Labs Reviewed, Medications Reviewed, Radiology Reviewed and Wound Reviewed.    Review of Systems:  Review of Systems   Constitutional: Negative for chills and fever.   HENT: Negative for congestion.    Respiratory: Negative for cough and shortness of breath.    Cardiovascular: Positive for leg swelling. Negative for chest pain and palpitations.   Gastrointestinal: Negative for abdominal pain, diarrhea, nausea and vomiting.   Genitourinary: Negative for dysuria and hematuria.   Musculoskeletal: Negative for joint pain and myalgias.   Skin: Negative for itching.   Neurological: Positive for sensory change. Negative for weakness and headaches.       Hemodynamics:  Temp (24hrs), Av.7 °C (98 °F), Min:36.7 °C (98 °F), Max:36.7 °C (98.1 °F)  Temperature: 36.7 °C (98.1 °F)  Pulse  Av.4  Min: 65  Max: 121   Blood Pressure: 144/87       Physical Exam:  Physical Exam   Constitutional: He is oriented to person, place, and time. He appears well-developed. No distress.   HENT:   Head: Normocephalic and atraumatic.    Eyes: Pupils are equal, round, and reactive to light. Conjunctivae and EOM are normal. No scleral icterus.   Neck: Normal range of motion. Neck supple. No JVD present.   Cardiovascular: Normal rate, regular rhythm and normal heart sounds.  Exam reveals no gallop and no friction rub.    Pulmonary/Chest: Effort normal and breath sounds normal. No respiratory distress. He has no rales.   Abdominal: Soft. Bowel sounds are normal. He exhibits no distension. There is no rebound and no guarding.   Musculoskeletal: He exhibits edema. He exhibits no tenderness.   Left 4th toe amp site-dressing in place, toes warm and able to wiggle, wearing postop shoe, no drainage seen seeping through dressing.    RUE PICC- CDI, non tender, no erythema.   Neurological: He is alert and oriented to person, place, and time.   Skin: Skin is warm. No rash noted. He is not diaphoretic.   Psychiatric: He has a normal mood and affect. Thought content normal.   Nursing note and vitals reviewed.      Meds:    Current Facility-Administered Medications:   •  insulin regular **AND** Accu-Chek ACHS **AND** NOTIFY MD and PharmD **AND** glucose 4 g **AND** dextrose 50%  •  aspirin  •  lisinopril  •  glimepiride  •  metFORMIN  •  metoprolol  •  lovastatin  •  hydroCHLOROthiazide  •  senna-docusate **AND** polyethylene glycol/lytes **AND** magnesium hydroxide **AND** bisacodyl  •  heparin  •  ondansetron  •  ondansetron  •  promethazine  •  promethazine  •  prochlorperazine  •  acetaminophen  •  oxyCODONE immediate-release  •  ampicillin-sulbactam (UNASYN) IV    Labs:  Recent Labs      10/05/18   0546   WBC  10.5   RBC  3.94*   HEMOGLOBIN  12.5*   HEMATOCRIT  35.2*   MCV  89.3   MCH  31.7   RDW  40.4   PLATELETCT  288   MPV  10.9     Recent Labs      10/05/18   0546   SODIUM  137   POTASSIUM  3.8   CHLORIDE  102   CO2  28   GLUCOSE  129*   BUN  19     Recent Labs      10/05/18   0546   CREATININE  1.10       Imaging:  No recent  imaging.      Micro:  Results     Procedure Component Value Units Date/Time    CULTURE WOUND W/ GRAM STAIN [[id number]]  (Abnormal)  (Susceptibility) Collected:  09/29/18 1700    Order Status:  Completed Specimen:  Wound Updated:  10/05/18 1530     Significant Indicator POS (POS)     Source WND     Site Left Foot Abscess     Culture Result Wound -- (A)     Gram Stain Result Moderate WBCs.  Moderate Gram positive cocci.  Rare Gram negative rods.       Culture Result Wound Streptococcus agalactiae (Group B)  Heavy growth   (A)      Viridans Streptococcus  Moderate growth   (A)      Staphylococcus epidermidis  Light growth   (A)    Culture & Susceptibility     STAPHYLOCOCCUS EPIDERMIDIS     Antibiotic Sensitivity Microscan Unit Status    Ampicillin/sulbactam Sensitive <=8/4 mcg/mL Final    Method: SENSITIVITY, SHAKA    Clindamycin Sensitive <=0.5 mcg/mL Final    Method: SENSITIVITY, SHAKA    Daptomycin Sensitive <=0.5 mcg/mL Final    Method: SENSITIVITY, SHAKA    Erythromycin Resistant >4 mcg/mL Final    Method: SENSITIVITY, SHAKA    Moxifloxacin Sensitive <=0.5 mcg/mL Final    Method: SENSITIVITY, SHAKA    Oxacillin Sensitive <=0.25 mcg/mL Final    Method: SENSITIVITY, SHAKA    Penicillin Resistant 8 mcg/mL Final    Method: SENSITIVITY, SHAKA    Tetracycline Intermediate 8 mcg/mL Final    Method: SENSITIVITY, SHAKA    Trimeth/Sulfa Sensitive <=0.5/9.5 mcg/mL Final    Method: SENSITIVITY, SHAKA    Vancomycin Sensitive 2 mcg/mL Final    Method: SENSITIVITY, SHAKA                       ANAEROBIC CULTURE [[id number]]  (Abnormal) Collected:  09/29/18 1700    Order Status:  Completed Specimen:  Wound Updated:  10/05/18 1530     Significant Indicator POS (POS)     Source WND     Site Left Foot Abscess     Anaerobic Culture, Culture Res Growth noted after further incubation, see below for  organism identification.   (A)      Bacteroides vulgatus  Light growth   (A)    FUNGAL CULTURE [[id number]] Collected:  09/29/18 1700    Order Status:  Completed  "Specimen:  Wound Updated:  10/05/18 1530     Significant Indicator NEG     Source WND     Site Left Foot Abscess     Fungal Culture Culture in progress.    AFB CULTURE [[id number]] Collected:  09/29/18 1700    Order Status:  Completed Specimen:  Wound Updated:  10/05/18 1530     Significant Indicator NEG     Source WND     Site Left Foot Abscess     AFB Culture Culture in progress.  NOTE:  Swabs are not recommended for the isolation of  Mycobacteria, since they provide limited material.  They  are acceptable only if a specimen cannot be collected by  any other means.  Negative results obtained from these  specimens submitted on swabs are not reliable.       AFB Smear Results No acid fast bacilli seen.    BLOOD CULTURE [[id number]] Collected:  09/29/18 1228    Order Status:  Completed Specimen:  Blood from Peripheral Updated:  10/04/18 1500     Significant Indicator NEG     Source BLD     Site PERIPHERAL     Blood Culture No growth after 5 days of incubation.    Narrative:       Per Hospital Policy: Only change Specimen Src: to \"Line\" if  specified by physician order.    BLOOD CULTURE [[id number]] Collected:  09/29/18 1243    Order Status:  Completed Specimen:  Blood from Peripheral Updated:  10/04/18 1500     Significant Indicator NEG     Source BLD     Site PERIPHERAL     Blood Culture No growth after 5 days of incubation.    Narrative:       Per Hospital Policy: Only change Specimen Src: to \"Line\" if  specified by physician order.    URINE CULTURE(NEW) [[id number]] Collected:  09/30/18 1440    Order Status:  Completed Specimen:  Urine from Urine, Clean Catch Updated:  10/02/18 0931     Significant Indicator NEG     Source UR     Site URINE, CLEAN CATCH     Urine Culture No growth at 48 hours.    Narrative:       Collected By:[id number] QUYEN CUNNINGHAM  Indication for culture:->Emergency Room Patient    GRAM STAIN [[id number]] Collected:  09/29/18 1700    Order Status:  Completed Specimen:  Wound Updated:  09/30/18 1751     " Significant Indicator .     Source WND     Site Left Foot Abscess     Gram Stain Result Moderate WBCs.  Moderate Gram positive cocci.  Rare Gram negative rods.      ACID FAST STAIN [[id number]] Collected:  09/29/18 1700    Order Status:  Completed Specimen:  Wound Updated:  09/30/18 1751     Significant Indicator NEG     Source WND     Site Left Foot Abscess     AFB Smear Results No acid fast bacilli seen.    URINALYSIS [[id number]]  (Abnormal) Collected:  09/30/18 1440    Order Status:  Completed Specimen:  Urine from Urine, Clean Catch Updated:  09/30/18 1454     Color DK Yellow     Character Clear     Specific Gravity 1.026     Ph 5.0     Glucose >=1000 (A) mg/dL      Ketones Trace (A) mg/dL      Protein 100 (A) mg/dL      Bilirubin Negative     Urobilinogen, Urine 1.0     Nitrite Negative     Leukocyte Esterase Negative     Occult Blood Negative     Micro Urine Req Microscopic    Narrative:       Collected By:[id number] QUYEN CUNNINGHAM  Indication for culture:->Emergency Room Patient          Assessment:  Active Hospital Problems    Diagnosis   • *Amputated toe of left foot ([facility]) [Z89.422]   • Diabetic foot infection ([facility]) [E11.628, L08.9]   • Type 2 diabetes mellitus with complication, without long-term current use of insulin ([facility]) [E11.8]   • Sepsis ([facility]) [A41.9]   Left foot abscess    Plan:  Left DM foot infection with abscess  Afebrile  No leukocytosis on last check  Bcxs on 9/29 negative  S/p L 4th toe amputation and I&D of multiloculated abscess on 9/29 with Dr. Lopez  OR cx +GBS, Strep Viridans, MSSE & bacteroides vulgatus  Being followed by LPS-started on daily dressings  Continue IV Unasyn while inpatient  Anticipate 4 weeks IV Abx  Estimated end date 10/27/18    Uncontrolled diabetes  Hemoglobin A1c 8.3% on 9/30/18  Keep blood sugar <150 control infection and promote wound healing    Renal insufficiency-resolved  Renally adjust antibiotics as needed  Avoid nephrotoxins      Prognosis of limb salvage  guarded    Due to lack of insurance, patient will need to complete IV antibiotic treatment inpatient.      Discussed with MARZENA Amanda with LPS.  Evaluated patient this morning, started him on daily dressings.

## 2018-10-08 LAB
ANION GAP SERPL CALC-SCNC: 9 MMOL/L (ref 0–11.9)
BASOPHILS # BLD AUTO: 0.7 % (ref 0–1.8)
BASOPHILS # BLD: 0.07 K/UL (ref 0–0.12)
BUN SERPL-MCNC: 31 MG/DL (ref 8–22)
CALCIUM SERPL-MCNC: 8.9 MG/DL (ref 8.5–10.5)
CHLORIDE SERPL-SCNC: 103 MMOL/L (ref 96–112)
CO2 SERPL-SCNC: 25 MMOL/L (ref 20–33)
CREAT SERPL-MCNC: 0.87 MG/DL (ref 0.5–1.4)
EOSINOPHIL # BLD AUTO: 0.4 K/UL (ref 0–0.51)
EOSINOPHIL NFR BLD: 4 % (ref 0–6.9)
ERYTHROCYTE [DISTWIDTH] IN BLOOD BY AUTOMATED COUNT: 41.2 FL (ref 35.9–50)
GLUCOSE BLD-MCNC: 119 MG/DL (ref 65–99)
GLUCOSE BLD-MCNC: 124 MG/DL (ref 65–99)
GLUCOSE BLD-MCNC: 138 MG/DL (ref 65–99)
GLUCOSE BLD-MCNC: 151 MG/DL (ref 65–99)
GLUCOSE SERPL-MCNC: 135 MG/DL (ref 65–99)
HCT VFR BLD AUTO: 36.7 % (ref 42–52)
HGB BLD-MCNC: 12.4 G/DL (ref 14–18)
IMM GRANULOCYTES # BLD AUTO: 0.16 K/UL (ref 0–0.11)
IMM GRANULOCYTES NFR BLD AUTO: 1.6 % (ref 0–0.9)
LYMPHOCYTES # BLD AUTO: 1.82 K/UL (ref 1–4.8)
LYMPHOCYTES NFR BLD: 18.2 % (ref 22–41)
MCH RBC QN AUTO: 30.7 PG (ref 27–33)
MCHC RBC AUTO-ENTMCNC: 33.8 G/DL (ref 33.7–35.3)
MCV RBC AUTO: 90.8 FL (ref 81.4–97.8)
MONOCYTES # BLD AUTO: 0.77 K/UL (ref 0–0.85)
MONOCYTES NFR BLD AUTO: 7.7 % (ref 0–13.4)
NEUTROPHILS # BLD AUTO: 6.8 K/UL (ref 1.82–7.42)
NEUTROPHILS NFR BLD: 67.8 % (ref 44–72)
NRBC # BLD AUTO: 0 K/UL
NRBC BLD-RTO: 0 /100 WBC
PLATELET # BLD AUTO: 347 K/UL (ref 164–446)
PMV BLD AUTO: 10.5 FL (ref 9–12.9)
POTASSIUM SERPL-SCNC: 3.8 MMOL/L (ref 3.6–5.5)
RBC # BLD AUTO: 4.04 M/UL (ref 4.7–6.1)
SODIUM SERPL-SCNC: 137 MMOL/L (ref 135–145)
WBC # BLD AUTO: 10 K/UL (ref 4.8–10.8)

## 2018-10-08 PROCEDURE — 770006 HCHG ROOM/CARE - MED/SURG/GYN SEMI*

## 2018-10-08 PROCEDURE — 85025 COMPLETE CBC W/AUTO DIFF WBC: CPT

## 2018-10-08 PROCEDURE — 99232 SBSQ HOSP IP/OBS MODERATE 35: CPT | Performed by: INTERNAL MEDICINE

## 2018-10-08 PROCEDURE — 700105 HCHG RX REV CODE 258: Performed by: NURSE PRACTITIONER

## 2018-10-08 PROCEDURE — 700111 HCHG RX REV CODE 636 W/ 250 OVERRIDE (IP): Performed by: PHYSICIAN ASSISTANT

## 2018-10-08 PROCEDURE — 80048 BASIC METABOLIC PNL TOTAL CA: CPT

## 2018-10-08 PROCEDURE — 700111 HCHG RX REV CODE 636 W/ 250 OVERRIDE (IP): Performed by: HOSPITALIST

## 2018-10-08 PROCEDURE — 700105 HCHG RX REV CODE 258: Performed by: PHYSICIAN ASSISTANT

## 2018-10-08 PROCEDURE — 82962 GLUCOSE BLOOD TEST: CPT | Mod: 91

## 2018-10-08 PROCEDURE — 700102 HCHG RX REV CODE 250 W/ 637 OVERRIDE(OP): Performed by: NURSE PRACTITIONER

## 2018-10-08 PROCEDURE — A9270 NON-COVERED ITEM OR SERVICE: HCPCS | Performed by: NURSE PRACTITIONER

## 2018-10-08 PROCEDURE — 700111 HCHG RX REV CODE 636 W/ 250 OVERRIDE (IP): Performed by: NURSE PRACTITIONER

## 2018-10-08 PROCEDURE — 36415 COLL VENOUS BLD VENIPUNCTURE: CPT

## 2018-10-08 RX ADMIN — AMPICILLIN AND SULBACTAM 3 G: 2; 1 INJECTION, POWDER, FOR SOLUTION INTRAVENOUS at 01:57

## 2018-10-08 RX ADMIN — METOPROLOL TARTRATE 25 MG: 25 TABLET, FILM COATED ORAL at 16:22

## 2018-10-08 RX ADMIN — HEPARIN SODIUM 5000 UNITS: 5000 INJECTION, SOLUTION INTRAVENOUS; SUBCUTANEOUS at 16:22

## 2018-10-08 RX ADMIN — METFORMIN HYDROCHLORIDE 850 MG: 850 TABLET ORAL at 16:22

## 2018-10-08 RX ADMIN — AMPICILLIN SODIUM AND SULBACTAM SODIUM 3 G: 2; 1 INJECTION, POWDER, FOR SOLUTION INTRAMUSCULAR; INTRAVENOUS at 11:11

## 2018-10-08 RX ADMIN — HEPARIN SODIUM 5000 UNITS: 5000 INJECTION, SOLUTION INTRAVENOUS; SUBCUTANEOUS at 20:53

## 2018-10-08 RX ADMIN — METOPROLOL TARTRATE 25 MG: 25 TABLET, FILM COATED ORAL at 05:48

## 2018-10-08 RX ADMIN — INSULIN HUMAN 2 UNITS: 100 INJECTION, SOLUTION PARENTERAL at 16:23

## 2018-10-08 RX ADMIN — LISINOPRIL 30 MG: 10 TABLET ORAL at 05:48

## 2018-10-08 RX ADMIN — HYDROCHLOROTHIAZIDE 12.5 MG: 12.5 TABLET ORAL at 05:48

## 2018-10-08 RX ADMIN — AMPICILLIN AND SULBACTAM 3 G: 2; 1 INJECTION, POWDER, FOR SOLUTION INTRAVENOUS at 05:47

## 2018-10-08 RX ADMIN — GLIMEPIRIDE 4 MG: 4 TABLET ORAL at 05:48

## 2018-10-08 RX ADMIN — AMPICILLIN SODIUM AND SULBACTAM SODIUM 3 G: 2; 1 INJECTION, POWDER, FOR SOLUTION INTRAMUSCULAR; INTRAVENOUS at 18:00

## 2018-10-08 RX ADMIN — LOVASTATIN 40 MG: 20 TABLET ORAL at 05:47

## 2018-10-08 RX ADMIN — METFORMIN HYDROCHLORIDE 850 MG: 850 TABLET ORAL at 11:11

## 2018-10-08 RX ADMIN — AMPICILLIN SODIUM AND SULBACTAM SODIUM 3 G: 2; 1 INJECTION, POWDER, FOR SOLUTION INTRAMUSCULAR; INTRAVENOUS at 23:30

## 2018-10-08 RX ADMIN — HEPARIN SODIUM 5000 UNITS: 5000 INJECTION, SOLUTION INTRAVENOUS; SUBCUTANEOUS at 05:47

## 2018-10-08 ASSESSMENT — PAIN SCALES - GENERAL
PAINLEVEL_OUTOF10: 0

## 2018-10-08 ASSESSMENT — ENCOUNTER SYMPTOMS
CHILLS: 0
WHEEZING: 0
FEVER: 0
BACK PAIN: 0
SHORTNESS OF BREATH: 0
CONSTIPATION: 0
COUGH: 0
FALLS: 0
WEAKNESS: 0
MYALGIAS: 1
DIZZINESS: 0
EYES NEGATIVE: 1
SENSORY CHANGE: 1
PALPITATIONS: 0
FOCAL WEAKNESS: 0
NAUSEA: 0
VOMITING: 0
ABDOMINAL PAIN: 0
PSYCHIATRIC NEGATIVE: 1
DIARRHEA: 0
HEADACHES: 0

## 2018-10-08 NOTE — PROGRESS NOTES
Infectious Disease Progress Note    Author: Renetta Beauchamp M.D. Date & Time of service: 10/8/2018  10:03 AM    Chief Complaint:  Left DM foot infection  Left foot abscess    Interval History:  62 y.o. Male with Left foot DM foot infection with abscess s/p 4th toe amputation and I&D.  10/2- Tm 99.7, no WBC, 7/10 L foot pain- improved with rest and pain meds, no issue with abx.  10/3- AF, WBC 10.6, Left foot sore- better than yesterday, tolerating abx.  10/4- AF, no WBC, sleeping- easily aroused, no issue with abx, generalized tenderness to L foot, had a HA this AM that is slowly resolving.  10/5-T-max 99, WBC 10.5, denies any pain to left foot, resting in bed sleeping-easily arousable, tolerating antibiotics.  10/6- Tm 99.5, no WBC, L  after ambulating, no issue with abx.  10/7-AF, no WBC, tolerating antibiotics, denies any left foot pain, reports ambulating around the hallways without issue.  10/8 AF WBC 10 patient complains of left foot pain especially with movement, tolerating IV antibiotics without any issues  Labs Reviewed, Medications Reviewed, Radiology Reviewed and Wound Reviewed.    Review of Systems:  Review of Systems   Constitutional: Negative for chills and fever.   HENT: Negative for congestion.    Respiratory: Negative for cough and shortness of breath.    Cardiovascular: Positive for leg swelling. Negative for chest pain and palpitations.   Gastrointestinal: Negative for abdominal pain, diarrhea, nausea and vomiting.   Genitourinary: Negative for dysuria and hematuria.   Musculoskeletal: Positive for myalgias. Negative for joint pain.   Skin: Negative for itching.   Neurological: Positive for sensory change. Negative for weakness and headaches.       Hemodynamics:  Temp (24hrs), Av.6 °C (97.9 °F), Min:36.6 °C (97.8 °F), Max:36.7 °C (98.1 °F)  Temperature: 36.6 °C (97.8 °F)  Pulse  Av.2  Min: 63  Max: 121   Blood Pressure: 130/81       Physical Exam:  Physical Exam    Constitutional: He is oriented to person, place, and time. He appears well-developed. No distress.   Older than stated age   HENT:   Head: Normocephalic and atraumatic.   Eyes: Pupils are equal, round, and reactive to light. Conjunctivae and EOM are normal. No scleral icterus.   Neck: Normal range of motion. Neck supple. No JVD present.   Cardiovascular: Normal rate, regular rhythm and normal heart sounds.  Exam reveals no gallop and no friction rub.    Pulmonary/Chest: Effort normal and breath sounds normal. No respiratory distress. He has no rales.   Abdominal: Soft. Bowel sounds are normal. He exhibits no distension. There is no rebound and no guarding.   Musculoskeletal: He exhibits edema. He exhibits no tenderness.   Left 4th toe amp site-dressing in place, toes warm and able to wiggle, wearing postop shoe, no drainage seen seeping through dressing.    RUE PICC- CDI, non tender, no erythema.   Neurological: He is alert and oriented to person, place, and time.   Skin: Skin is warm. No rash noted. He is not diaphoretic.   Psychiatric: He has a normal mood and affect. Thought content normal.   Nursing note and vitals reviewed.      Meds:    Current Facility-Administered Medications:   •  ampicillin-sulbactam (UNASYN) IV  •  insulin regular **AND** Accu-Chek ACHS **AND** NOTIFY MD and PharmD **AND** glucose 4 g **AND** dextrose 50%  •  aspirin  •  lisinopril  •  glimepiride  •  metFORMIN  •  metoprolol  •  lovastatin  •  hydroCHLOROthiazide  •  senna-docusate **AND** polyethylene glycol/lytes **AND** magnesium hydroxide **AND** bisacodyl  •  heparin  •  ondansetron  •  ondansetron  •  promethazine  •  promethazine  •  prochlorperazine  •  acetaminophen  •  oxyCODONE immediate-release    Labs:  Recent Labs      10/08/18   0327   WBC  10.0   RBC  4.04*   HEMOGLOBIN  12.4*   HEMATOCRIT  36.7*   MCV  90.8   MCH  30.7   RDW  41.2   PLATELETCT  347   MPV  10.5   NEUTSPOLYS  67.80   LYMPHOCYTES  18.20*   MONOCYTES  7.70    EOSINOPHILS  4.00   BASOPHILS  0.70     Recent Labs      10/08/18   0327   SODIUM  137   POTASSIUM  3.8   CHLORIDE  103   CO2  25   GLUCOSE  135*   BUN  31*     Recent Labs      10/08/18   0327   CREATININE  0.87       Imaging:  No recent imaging.      Micro:  Results     Procedure Component Value Units Date/Time    CULTURE WOUND W/ GRAM STAIN [[id number]]  (Abnormal)  (Susceptibility) Collected:  09/29/18 1700    Order Status:  Completed Specimen:  Wound Updated:  10/05/18 1530     Significant Indicator POS (POS)     Source WND     Site Left Foot Abscess     Culture Result Wound -- (A)     Gram Stain Result Moderate WBCs.  Moderate Gram positive cocci.  Rare Gram negative rods.       Culture Result Wound Streptococcus agalactiae (Group B)  Heavy growth   (A)      Viridans Streptococcus  Moderate growth   (A)      Staphylococcus epidermidis  Light growth   (A)    Culture & Susceptibility     STAPHYLOCOCCUS EPIDERMIDIS     Antibiotic Sensitivity Microscan Unit Status    Ampicillin/sulbactam Sensitive <=8/4 mcg/mL Final    Method: SENSITIVITY, SHAKA    Clindamycin Sensitive <=0.5 mcg/mL Final    Method: SENSITIVITY, SHAKA    Daptomycin Sensitive <=0.5 mcg/mL Final    Method: SENSITIVITY, SHAKA    Erythromycin Resistant >4 mcg/mL Final    Method: SENSITIVITY, SHAKA    Moxifloxacin Sensitive <=0.5 mcg/mL Final    Method: SENSITIVITY, SHAKA    Oxacillin Sensitive <=0.25 mcg/mL Final    Method: SENSITIVITY, SHAKA    Penicillin Resistant 8 mcg/mL Final    Method: SENSITIVITY, SHAKA    Tetracycline Intermediate 8 mcg/mL Final    Method: SENSITIVITY, SHAKA    Trimeth/Sulfa Sensitive <=0.5/9.5 mcg/mL Final    Method: SENSITIVITY, SHAKA    Vancomycin Sensitive 2 mcg/mL Final    Method: SENSITIVITY, SHAKA                       ANAEROBIC CULTURE [[id number]]  (Abnormal) Collected:  09/29/18 1700    Order Status:  Completed Specimen:  Wound Updated:  10/05/18 1530     Significant Indicator POS (POS)     Source WND     Site Left Foot Abscess      "Anaerobic Culture, Culture Res Growth noted after further incubation, see below for  organism identification.   (A)      Bacteroides vulgatus  Light growth   (A)    FUNGAL CULTURE [[id number]] Collected:  09/29/18 1700    Order Status:  Completed Specimen:  Wound Updated:  10/05/18 1530     Significant Indicator NEG     Source WND     Site Left Foot Abscess     Fungal Culture Culture in progress.    AFB CULTURE [[id number]] Collected:  09/29/18 1700    Order Status:  Completed Specimen:  Wound Updated:  10/05/18 1530     Significant Indicator NEG     Source WND     Site Left Foot Abscess     AFB Culture Culture in progress.  NOTE:  Swabs are not recommended for the isolation of  Mycobacteria, since they provide limited material.  They  are acceptable only if a specimen cannot be collected by  any other means.  Negative results obtained from these  specimens submitted on swabs are not reliable.       AFB Smear Results No acid fast bacilli seen.    BLOOD CULTURE [[id number]] Collected:  09/29/18 1228    Order Status:  Completed Specimen:  Blood from Peripheral Updated:  10/04/18 1500     Significant Indicator NEG     Source BLD     Site PERIPHERAL     Blood Culture No growth after 5 days of incubation.    Narrative:       Per Hospital Policy: Only change Specimen Src: to \"Line\" if  specified by physician order.    BLOOD CULTURE [[id number]] Collected:  09/29/18 1243    Order Status:  Completed Specimen:  Blood from Peripheral Updated:  10/04/18 1500     Significant Indicator NEG     Source BLD     Site PERIPHERAL     Blood Culture No growth after 5 days of incubation.    Narrative:       Per Hospital Policy: Only change Specimen Src: to \"Line\" if  specified by physician order.    URINE CULTURE(NEW) [[id number]] Collected:  09/30/18 1440    Order Status:  Completed Specimen:  Urine from Urine, Clean Catch Updated:  10/02/18 0931     Significant Indicator NEG     Source UR     Site URINE, CLEAN CATCH     Urine Culture No " growth at 48 hours.    Narrative:       Collected By:[id number] QUYEN CUNNINGHAM  Indication for culture:->Emergency Room Patient          Assessment:  Active Hospital Problems    Diagnosis   • *Amputated toe of left foot ([facility]) [Z89.422]   • Diabetic foot infection ([facility]) [E11.628, L08.9]   • Type 2 diabetes mellitus with complication, without long-term current use of insulin ([facility]) [E11.8]   • Sepsis ([facility]) [A41.9]   Left foot abscess    Plan:  Left DM foot infection with abscess  Afebrile  No leukocytosis on last check  Bcxs on 9/29 negative  S/p L 4th toe amputation and I&D of multiloculated abscess on 9/29 with Dr. Lopez  OR cx +GBS, Strep Viridans, MSSE & bacteroides vulgatus  Being followed by LPS-started on daily dressings  Continue IV Unasyn while inpatient  Anticipate 4 weeks IV Abx  Estimated end date 10/27/18    Uncontrolled diabetes  Hemoglobin A1c 8.3% on 9/30/18  Keep blood sugar <150 control infection and promote wound healing    Renal insufficiency-resolved  Renally adjust antibiotics as needed  Avoid nephrotoxins    Prognosis of limb salvage guarded    Due to lack of insurance, patient will need to complete IV antibiotic treatment inpatient.

## 2018-10-08 NOTE — CARE PLAN
Problem: Infection  Goal: Will remain free from infection  Standard precautions in place.  Hand hygiene performed before and after pt contact.  Pt has no signs of infection at this time.  Will continue to monitor    Problem: Mobility  Goal: Risk for activity intolerance will decrease  Pt up self with a steady gate and offloading shoes.  Pt ambulates quarles frequently.

## 2018-10-08 NOTE — PROGRESS NOTES
Pt is AAOx4  Pt denies any pain at this time  Medicated per MAR  VS WNL  Dressing CDI  Pt is up self with a steady gate  POC discussed  All needs met at this time  Bed in low position  Call light within reach  Rounding in place

## 2018-10-08 NOTE — PROGRESS NOTES
[facility] Medicine Daily Progress Note    Date of Service  10/8/2018    Chief Complaint  62 y.o. male with PMH significant for DM, HTN, and dyslipidemia admitted 9/29/2018 with redness swelling of left foot.  He dropped a piece of furniture on his left foot about 3 weeks ago.  About 1 week ago he noticed left fourth toe necrosis with associated foot swelling.     Hospital Course   He underwent left fourth toe amputation through MP joint and irrigation and debridement of multiloculated dorsal foot abscesses by Dr. Lopez on 9/29/2018. Wound culture (+) for Strep agalactiae (Group B).     Interval Problem Update  10/8 - here for IV ABX until 10/27/18. CM looking into potential SNF placement, however, patient has no payor source so may need to complete his ABX course in the hospital.   10/7 - POD #8  -ambulating in the halls with walker  -reports good pain control without narcotic use  10/6 - PICC line placed yesterday  -4 weeks ABX per ID recommendations with estimate end date 10/27/18  10/5 - POD #6  - plan for 4 weeks IV ABX per ID. PICC line placement today  10/4 - POD #5 - 0/10 pain at rest, 4-6/10 with ambulation  -left foot dressing intact with dried brown drainage  10/3 - POD #4 pain now 6/10 with ambulation and 0/10 at rest. He refuses any narcotics, only wants Tylenol or ASA  -left foot remains 1+ edema with ecchymosis and PPP. Wound draining brown thin fluid, non-odorous.  10/2 - POD #3 - ongoing 10/10 pain with ambulation. 3-4/10 at rest  -hemodynamically stable  -BM yesterday  -dressing changed yesterday  -plan for 2-4 weeks IV ABX per ID recommendations  -LEONEL resolved. DC IVF  -better blood glucose control after restarting home meds   10/1 - POD #2  -he reports 10/10 pain left foot when up ambulating with walker  -wound culture growing Streptococcus agalactiae (Group B) Heavy growth - ID consulted  -blood sugars 170-332 - restarted home meds and adjusted sliding scale. Diabetic Educator consult  pending  -added Metoprolol today for better BP control    Consultants/Specialty  -Ortho  -LPS    Code Status  FULL    Disposition  Undetermined at this time. Likely will remain in hospital until IV ABX course completed.    Review of Systems  Review of Systems   Constitutional: Negative for chills and fever.   HENT: Negative.    Eyes: Negative.    Respiratory: Negative for cough, shortness of breath and wheezing.    Cardiovascular: Positive for leg swelling (left foot). Negative for chest pain and palpitations.   Gastrointestinal: Negative for abdominal pain, constipation, diarrhea, nausea and vomiting.   Genitourinary: Negative.    Musculoskeletal: Positive for joint pain (improving). Negative for back pain and falls.   Skin: Negative.    Neurological: Negative for dizziness and focal weakness.   Psychiatric/Behavioral: Negative.    All other systems reviewed and are negative.       Physical Exam  Temp:  [36.6 °C (97.8 °F)-36.7 °C (98.1 °F)] 36.6 °C (97.8 °F)  Pulse:  [63-84] 63  Resp:  [16-63] 63  BP: (115-147)/(73-81) 130/81    Physical Exam   Constitutional: He is oriented to person, place, and time. Vital signs are normal. He appears well-developed and well-nourished. He is cooperative. No distress.   Sitting up in chair watching TV in no acute distress.  Current pain level 0/10   HENT:   Head: Normocephalic.   Right Ear: Hearing normal.   Left Ear: Hearing normal.   Nose: Nose normal.   Mouth/Throat: Oropharynx is clear and moist and mucous membranes are normal.   Eyes: Pupils are equal, round, and reactive to light. Conjunctivae, EOM and lids are normal. No scleral icterus.   Neck: Phonation normal. No JVD present.   Cardiovascular: Normal rate, normal heart sounds, intact distal pulses and normal pulses.    No murmur heard.  Pulses:       Dorsalis pedis pulses are 2+ on the right side, and 2+ on the left side.   1+ pedal/ankle edema LLE   Pulmonary/Chest: Effort normal and breath sounds normal. No respiratory  distress. He has no wheezes.   Good IS use   Abdominal: Soft. Normal appearance and bowel sounds are normal. He exhibits no distension. There is no tenderness. There is no rebound.   Musculoskeletal:        Feet:    Decreased ROM left ankle/foot due to pain  Specialty boot/shoe on left foot.     Neurological: He is alert and oriented to person, place, and time. No cranial nerve deficit. GCS eye subscore is 4. GCS verbal subscore is 5. GCS motor subscore is 6.   Moves all extremities   Skin: Skin is warm and dry.   Left foot dressing CDI     Psychiatric: He has a normal mood and affect. His speech is normal and behavior is normal. Judgment normal. Cognition and memory are normal.   Nursing note and vitals reviewed.      Fluids  No intake or output data in the 24 hours ending 10/08/18 1055    Laboratory  Recent Labs      10/08/18   0327   WBC  10.0   RBC  4.04*   HEMOGLOBIN  12.4*   HEMATOCRIT  36.7*   MCV  90.8   MCH  30.7   MCHC  33.8   RDW  41.2   PLATELETCT  347   MPV  10.5     Recent Labs      10/08/18   0327   SODIUM  137   POTASSIUM  3.8   CHLORIDE  103   CO2  25   GLUCOSE  135*   BUN  31*   CREATININE  0.87   CALCIUM  8.9                   Imaging  IR-PICC LINE PLACEMENT   Final Result                  Ultrasound-guided PICC placement performed by qualified nursing staff as    above.          DX-FOOT-COMPLETE 3+ LEFT   Final Result      1.  No radiographic evidence for osteomyelitis      2.  Gas associated with the lateral forefoot      DX-CHEST-PORTABLE (1 VIEW)   Final Result      Negative single view of the chest.           Assessment/Plan  * Amputated toe of left foot ([facility]CC)   Assessment & Plan    -4th toe by Dr. Lopez 9/29/2018  -PT/OT  -ID following - recommend 4 weeks IV ABX.   -PICC line  -DC plan uncertain at this time        Diabetic foot infection (HCC)- (present on admission)   Assessment & Plan    -Status post fourth toe amputation left foot on 9/29/2018 by orthopedic surgery  -Blood cultures  negative   -Pathology report left fourth toe acute osteomyelitis  -wound culture Streptococcus agalactiae (Group B) Heavy growth -ID following - recommend 4 weeks IV ABX - estimated end date 10/27/18  -LPS  -good glucose control         Essential hypertension- (present on admission)   Assessment & Plan    -stable on HCTZ, Lisinopril and Metoprolol                Dyslipidemia- (present on admission)   Assessment & Plan    -Home lovastatin        Type 2 diabetes mellitus with complication, without long-term current use of insulin (HCC)- (present on admission)   Assessment & Plan    -better control now that he is back on his home meds  -continue ACHS accu-checks with [employer]  -continue home meds  -Diabetic Diet               VTE prophylaxis: Heparin    GUS Atkins

## 2018-10-09 LAB
GLUCOSE BLD-MCNC: 134 MG/DL (ref 65–99)
GLUCOSE BLD-MCNC: 138 MG/DL (ref 65–99)
GLUCOSE BLD-MCNC: 140 MG/DL (ref 65–99)
GLUCOSE BLD-MCNC: 143 MG/DL (ref 65–99)

## 2018-10-09 PROCEDURE — A9270 NON-COVERED ITEM OR SERVICE: HCPCS | Performed by: HOSPITALIST

## 2018-10-09 PROCEDURE — 770006 HCHG ROOM/CARE - MED/SURG/GYN SEMI*

## 2018-10-09 PROCEDURE — 700111 HCHG RX REV CODE 636 W/ 250 OVERRIDE (IP): Performed by: NURSE PRACTITIONER

## 2018-10-09 PROCEDURE — 700102 HCHG RX REV CODE 250 W/ 637 OVERRIDE(OP): Performed by: HOSPITALIST

## 2018-10-09 PROCEDURE — 700102 HCHG RX REV CODE 250 W/ 637 OVERRIDE(OP): Performed by: NURSE PRACTITIONER

## 2018-10-09 PROCEDURE — 700111 HCHG RX REV CODE 636 W/ 250 OVERRIDE (IP): Performed by: HOSPITALIST

## 2018-10-09 PROCEDURE — 97116 GAIT TRAINING THERAPY: CPT

## 2018-10-09 PROCEDURE — 99232 SBSQ HOSP IP/OBS MODERATE 35: CPT | Performed by: INTERNAL MEDICINE

## 2018-10-09 PROCEDURE — 700105 HCHG RX REV CODE 258: Performed by: NURSE PRACTITIONER

## 2018-10-09 PROCEDURE — 82962 GLUCOSE BLOOD TEST: CPT

## 2018-10-09 PROCEDURE — A9270 NON-COVERED ITEM OR SERVICE: HCPCS | Performed by: NURSE PRACTITIONER

## 2018-10-09 PROCEDURE — 99233 SBSQ HOSP IP/OBS HIGH 50: CPT | Performed by: INTERNAL MEDICINE

## 2018-10-09 RX ADMIN — AMPICILLIN SODIUM AND SULBACTAM SODIUM 3 G: 2; 1 INJECTION, POWDER, FOR SOLUTION INTRAMUSCULAR; INTRAVENOUS at 18:00

## 2018-10-09 RX ADMIN — METFORMIN HYDROCHLORIDE 850 MG: 850 TABLET ORAL at 12:48

## 2018-10-09 RX ADMIN — METFORMIN HYDROCHLORIDE 850 MG: 850 TABLET ORAL at 17:15

## 2018-10-09 RX ADMIN — HEPARIN SODIUM 5000 UNITS: 5000 INJECTION, SOLUTION INTRAVENOUS; SUBCUTANEOUS at 14:00

## 2018-10-09 RX ADMIN — LOVASTATIN 40 MG: 20 TABLET ORAL at 05:48

## 2018-10-09 RX ADMIN — METFORMIN HYDROCHLORIDE 850 MG: 850 TABLET ORAL at 09:34

## 2018-10-09 RX ADMIN — AMPICILLIN SODIUM AND SULBACTAM SODIUM 3 G: 2; 1 INJECTION, POWDER, FOR SOLUTION INTRAMUSCULAR; INTRAVENOUS at 12:48

## 2018-10-09 RX ADMIN — AMPICILLIN SODIUM AND SULBACTAM SODIUM 3 G: 2; 1 INJECTION, POWDER, FOR SOLUTION INTRAMUSCULAR; INTRAVENOUS at 05:47

## 2018-10-09 RX ADMIN — METOPROLOL TARTRATE 25 MG: 25 TABLET, FILM COATED ORAL at 05:48

## 2018-10-09 RX ADMIN — GLIMEPIRIDE 4 MG: 4 TABLET ORAL at 05:48

## 2018-10-09 RX ADMIN — METOPROLOL TARTRATE 25 MG: 25 TABLET, FILM COATED ORAL at 17:16

## 2018-10-09 RX ADMIN — HEPARIN SODIUM 5000 UNITS: 5000 INJECTION, SOLUTION INTRAVENOUS; SUBCUTANEOUS at 22:02

## 2018-10-09 RX ADMIN — HYDROCHLOROTHIAZIDE 12.5 MG: 12.5 TABLET ORAL at 05:47

## 2018-10-09 RX ADMIN — HEPARIN SODIUM 5000 UNITS: 5000 INJECTION, SOLUTION INTRAVENOUS; SUBCUTANEOUS at 05:48

## 2018-10-09 RX ADMIN — LISINOPRIL 30 MG: 10 TABLET ORAL at 05:47

## 2018-10-09 RX ADMIN — STANDARDIZED SENNA CONCENTRATE AND DOCUSATE SODIUM 2 TABLET: 8.6; 5 TABLET ORAL at 18:00

## 2018-10-09 ASSESSMENT — ENCOUNTER SYMPTOMS
HEADACHES: 0
SENSORY CHANGE: 1
COUGH: 0
VOMITING: 0
MYALGIAS: 1
CONSTIPATION: 0
DIARRHEA: 0
CHILLS: 0
ABDOMINAL PAIN: 0
FEVER: 0
SHORTNESS OF BREATH: 0
SORE THROAT: 0
NAUSEA: 0

## 2018-10-09 ASSESSMENT — PAIN SCALES - GENERAL
PAINLEVEL_OUTOF10: 0

## 2018-10-09 ASSESSMENT — COGNITIVE AND FUNCTIONAL STATUS - GENERAL
SUGGESTED CMS G CODE MODIFIER MOBILITY: CH
MOBILITY SCORE: 24

## 2018-10-09 ASSESSMENT — GAIT ASSESSMENTS
ASSISTIVE DEVICE: FRONT WHEEL WALKER
DEVIATION: ANTALGIC
DISTANCE (FEET): 500
GAIT LEVEL OF ASSIST: MODIFIED INDEPENDENT

## 2018-10-09 NOTE — THERAPY
"Pt w/improved functional mobility. Pt demonstrated increased micha though steady, and antalgic gait. Pt w/no LOB and has demonstrated improved strength. Pt has met all acute goals at this time. Anticipate pt to be able to d/c home w/HH and family support once medically cleared. Will discuss w/primary PT.    Physical Therapy Treatment completed.   Bed Mobility:  Supine to Sit: Modified Independent  Transfers: Sit to Stand: Modified Independent  Gait: Level Of Assist: Modified Independent with Front-Wheel Walker         See \"Rehab Therapy-Acute\" Patient Summary Report for complete documentation.       "

## 2018-10-09 NOTE — PROGRESS NOTES
Pt is AAOx4  Pt denies any pain at this time  VS WNL  Dressing CDI  Pt up self with a steady gate, no assistance required  POC discussed  All needs met at this time  Bed in low position  Call light within reach  Rounding in place

## 2018-10-09 NOTE — PROGRESS NOTES
Infectious Disease Progress Note    Author: GUS Castro Date & Time of service: 10/9/2018  12:42 PM    Chief Complaint:  Left DM foot infection  Left foot abscess    Interval History:  62 y.o. Male with Left foot DM foot infection with abscess s/p 4th toe amputation and I&D.  10/2- Tm 99.7, no WBC, 7/10 L foot pain- improved with rest and pain meds, no issue with abx.  10/3- AF, WBC 10.6, Left foot sore- better than yesterday, tolerating abx.  10/4- AF, no WBC, sleeping- easily aroused, no issue with abx, generalized tenderness to L foot, had a HA this AM that is slowly resolving.  10/5-T-max 99, WBC 10.5, denies any pain to left foot, resting in bed sleeping-easily arousable, tolerating antibiotics.  10/6- Tm 99.5, no WBC, L  after ambulating, no issue with abx.  10/7-AF, no WBC, tolerating antibiotics, denies any left foot pain, reports ambulating around the hallways without issue.  10/8 AF WBC 10 patient complains of left foot pain especially with movement, tolerating IV antibiotics without any issues  10/9- Tm 99, no WBC, left foot slightly tender with ambulation, no issue with antibiotics.  Labs Reviewed, Medications Reviewed, Radiology Reviewed and Wound Reviewed.    Review of Systems:  Review of Systems   Constitutional: Negative for chills, fever and malaise/fatigue.   HENT: Negative for sore throat.    Respiratory: Negative for cough and shortness of breath.    Cardiovascular: Positive for leg swelling. Negative for chest pain.   Gastrointestinal: Negative for abdominal pain, constipation, diarrhea, nausea and vomiting.   Genitourinary: Negative for dysuria.   Musculoskeletal: Positive for joint pain and myalgias.        Intermittent   Skin: Negative for rash.   Neurological: Positive for sensory change. Negative for headaches.       Hemodynamics:  Temp (24hrs), Av °C (98.6 °F), Min:36.9 °C (98.4 °F), Max:37.2 °C (99 °F)  Temperature: 36.9 °C (98.4 °F)  Pulse  Av.2  Min:  63  Max: 121   Blood Pressure: 122/68       Physical Exam:  Physical Exam   Constitutional: He is oriented to person, place, and time. He appears well-developed and well-nourished. No distress.   Older than stated age   HENT:   Head: Normocephalic and atraumatic.   Eyes: Pupils are equal, round, and reactive to light. Conjunctivae and EOM are normal.   Neck: Normal range of motion. Neck supple. No tracheal deviation present.   Cardiovascular: Normal rate, regular rhythm and normal heart sounds.    No murmur heard.  Pulmonary/Chest: Effort normal and breath sounds normal. No respiratory distress. He has no wheezes.   Abdominal: Soft. Bowel sounds are normal. He exhibits no distension. There is no tenderness.   Musculoskeletal: He exhibits edema and tenderness.   Left 4th toe amp site-sutures in place, moderate maceration along incision, heavy serous drainage, no odor, mild erythema to surrounding tissue.    RUE PICC- CDI, non tender, no erythema.   Neurological: He is alert and oriented to person, place, and time.   Skin: Skin is warm. There is erythema.   Psychiatric: He has a normal mood and affect. His behavior is normal.   Nursing note and vitals reviewed.      Meds:    Current Facility-Administered Medications:   •  ampicillin-sulbactam (UNASYN) IV  •  insulin regular **AND** Accu-Chek ACHS **AND** NOTIFY MD and PharmD **AND** glucose 4 g **AND** dextrose 50%  •  aspirin  •  lisinopril  •  glimepiride  •  metFORMIN  •  metoprolol  •  lovastatin  •  hydroCHLOROthiazide  •  senna-docusate **AND** polyethylene glycol/lytes **AND** magnesium hydroxide **AND** bisacodyl  •  heparin  •  ondansetron  •  ondansetron  •  promethazine  •  promethazine  •  prochlorperazine  •  acetaminophen  •  oxyCODONE immediate-release    Labs:  Recent Labs      10/08/18   0327   WBC  10.0   RBC  4.04*   HEMOGLOBIN  12.4*   HEMATOCRIT  36.7*   MCV  90.8   MCH  30.7   RDW  41.2   PLATELETCT  347   MPV  10.5   NEUTSPOLYS  67.80    LYMPHOCYTES  18.20*   MONOCYTES  7.70   EOSINOPHILS  4.00   BASOPHILS  0.70     Recent Labs      10/08/18   0327   SODIUM  137   POTASSIUM  3.8   CHLORIDE  103   CO2  25   GLUCOSE  135*   BUN  31*     Recent Labs      10/08/18   0327   CREATININE  0.87       Imaging:  No recent imaging.      Micro:  Results     Procedure Component Value Units Date/Time    CULTURE WOUND W/ GRAM STAIN [[id number]]  (Abnormal)  (Susceptibility) Collected:  09/29/18 1700    Order Status:  Completed Specimen:  Wound Updated:  10/05/18 1530     Significant Indicator POS (POS)     Source WND     Site Left Foot Abscess     Culture Result Wound -- (A)     Gram Stain Result Moderate WBCs.  Moderate Gram positive cocci.  Rare Gram negative rods.       Culture Result Wound Streptococcus agalactiae (Group B)  Heavy growth   (A)      Viridans Streptococcus  Moderate growth   (A)      Staphylococcus epidermidis  Light growth   (A)    Culture & Susceptibility     STAPHYLOCOCCUS EPIDERMIDIS     Antibiotic Sensitivity Microscan Unit Status    Ampicillin/sulbactam Sensitive <=8/4 mcg/mL Final    Method: SENSITIVITY, SHAKA    Clindamycin Sensitive <=0.5 mcg/mL Final    Method: SENSITIVITY, SHAKA    Daptomycin Sensitive <=0.5 mcg/mL Final    Method: SENSITIVITY, SHAKA    Erythromycin Resistant >4 mcg/mL Final    Method: SENSITIVITY, SHAKA    Moxifloxacin Sensitive <=0.5 mcg/mL Final    Method: SENSITIVITY, SHAKA    Oxacillin Sensitive <=0.25 mcg/mL Final    Method: SENSITIVITY, SHAKA    Penicillin Resistant 8 mcg/mL Final    Method: SENSITIVITY, SHAKA    Tetracycline Intermediate 8 mcg/mL Final    Method: SENSITIVITY, SHAKA    Trimeth/Sulfa Sensitive <=0.5/9.5 mcg/mL Final    Method: SENSITIVITY, SHAKA    Vancomycin Sensitive 2 mcg/mL Final    Method: SENSITIVITY, SHAKA                       ANAEROBIC CULTURE [[id number]]  (Abnormal) Collected:  09/29/18 1700    Order Status:  Completed Specimen:  Wound Updated:  10/05/18 1530     Significant Indicator POS (POS)     Source  "WND     Site Left Foot Abscess     Anaerobic Culture, Culture Res Growth noted after further incubation, see below for  organism identification.   (A)      Bacteroides vulgatus  Light growth   (A)    FUNGAL CULTURE [[id number]] Collected:  09/29/18 1700    Order Status:  Completed Specimen:  Wound Updated:  10/05/18 1530     Significant Indicator NEG     Source WND     Site Left Foot Abscess     Fungal Culture Culture in progress.    AFB CULTURE [[id number]] Collected:  09/29/18 1700    Order Status:  Completed Specimen:  Wound Updated:  10/05/18 1530     Significant Indicator NEG     Source WND     Site Left Foot Abscess     AFB Culture Culture in progress.  NOTE:  Swabs are not recommended for the isolation of  Mycobacteria, since they provide limited material.  They  are acceptable only if a specimen cannot be collected by  any other means.  Negative results obtained from these  specimens submitted on swabs are not reliable.       AFB Smear Results No acid fast bacilli seen.    BLOOD CULTURE [[id number]] Collected:  09/29/18 1228    Order Status:  Completed Specimen:  Blood from Peripheral Updated:  10/04/18 1500     Significant Indicator NEG     Source BLD     Site PERIPHERAL     Blood Culture No growth after 5 days of incubation.    Narrative:       Per Hospital Policy: Only change Specimen Src: to \"Line\" if  specified by physician order.    BLOOD CULTURE [[id number]] Collected:  09/29/18 1243    Order Status:  Completed Specimen:  Blood from Peripheral Updated:  10/04/18 1500     Significant Indicator NEG     Source BLD     Site PERIPHERAL     Blood Culture No growth after 5 days of incubation.    Narrative:       Per Hospital Policy: Only change Specimen Src: to \"Line\" if  specified by physician order.          Assessment:  Active Hospital Problems    Diagnosis   • *Amputated toe of left foot ([facility]) [Z89.422]   • Diabetic foot infection ([facility]) [E11.628, L08.9]   • Type 2 diabetes mellitus with complication, without " long-term current use of insulin ([facility]) [E11.8]   • Sepsis ([facility]) [A41.9]   Left foot abscess    Plan:  Left DM foot infection with abscess  Tm 99  No leukocytosis on last check  Bcxs on 9/29 negative  S/p L 4th toe amputation and I&D of multiloculated abscess on 9/29 with Dr. Lopez  OR cx +GBS, Strep Viridans, MSSE & bacteroides vulgatus  Being followed by LPS-started on daily dressings- may need repeat I&D, will evaluate   Continue IV Unasyn while inpatient  Anticipate 4 weeks IV Abx  Estimated end date 10/27/18     Uncontrolled diabetes  Hemoglobin A1c 8.3% on 9/30/18  Keep blood sugar <150 control infection and promote wound healing    Renal insufficiency-resolved  Renally adjust antibiotics as needed  Avoid nephrotoxins    Prognosis of limb salvage guarded      Discussed with WOODY Veloz with LPS-May need repeat I&D.  Also discussed with [place].  Due to lack of insurance, will need to complete IV antibiotics and patient.

## 2018-10-09 NOTE — CARE PLAN
Problem: Venous Thromboembolism (VTW)/Deep Vein Thrombosis (DVT) Prevention:  Goal: Patient will participate in Venous Thrombosis (VTE)/Deep Vein Thrombosis (DVT)Prevention Measures    Intervention: Encourage ambulation/mobilization at level directed by Physical Therapy in collaboration with Interdisciplinary Team  Pt walks the quarles frequently.  Pt has a steady gate and no assistance is required      Problem: Discharge Barriers/Planning  Goal: Patient's continuum of care needs will be met  All pt needs are met at this time.  Pt updated on POC pt has no further questions

## 2018-10-09 NOTE — PROGRESS NOTES
[facility] Hospitalist Progress Note    Date of Service: 10/9/2018    Chief Complaint  62 y.o. male with diabetes mellitus, hypertension, hyperlipidemia, admitted 2018 with redness and swelling of the left foot after dropping some furniture on his foot 3 weeks prior to admission.  Noted to have marked leukocytosis, in setting of infected diabetic foot.  Gas was seen on the x-ray consistent with active infection.  Patient was started on antibiotics, and orthopedics was consulted and patient subsequently underwent left fourth toe amputation through MP joint, with irrigation and debridement of multiloculated dorsal foot abscess.  OR cultures grew BGS, Streptococcus viridans, MSSA and Bacteroides vulgatus.  Pathology showed acute osteomyelitis of the left fourth toe. ID was consulted, and recommended extended course of IV antibiotics with IV Unasyn until 10/27/18.  WBC has normalized.    Interval Problem Update  10/9/2018 - no overnight events. Remains hemodynamically stable and afebrile. Stable on RA.  Last WBC yesterday was normal.  BMP was unimpressive.  Apparently, no payor source for SNF for IV antibiotics, and so might need to complete antibiotics in the hospital.    > I have personally seen and examined the patient today.  Patient states that he is comfortable.  Denies any pain, even when walking on the left foot.  Denied any chest pain, shortness of breath, nausea, vomiting, abdominal pain, diarrhea, dysuria.      Consultants/Specialty  ID  Orthopedics    Disposition  Monitor on the floors.  Needs to complete course of IV antibiotics.        ROS     Pertinent positives/negatives as mentioned above.     A complete review of systems was personally done by me. All other systems were negative.      Physical Exam  Laboratory/Imaging   Hemodynamics  Temp (24hrs), Av.9 °C (98.5 °F), Min:36.6 °C (97.8 °F), Max:37.2 °C (99 °F)   Temperature: 37.2 °C (99 °F)  Pulse  Av.5  Min: 63  Max: 121    Blood Pressure:  156/90      Respiratory      Respiration: 16, Pulse Oximetry: 99 %        RUL Breath Sounds: Clear, RML Breath Sounds: Clear, RLL Breath Sounds: Clear, STACY Breath Sounds: Clear, LLL Breath Sounds: Clear    Fluids    Intake/Output Summary (Last 24 hours) at 10/09/18 0722  Last data filed at 10/08/18 1244   Gross per 24 hour   Intake                0 ml   Output              450 ml   Net             -450 ml       Nutrition  Orders Placed This Encounter   Procedures   • Diet Order Diabetic     Standing Status:   Standing     Number of Occurrences:   1     Order Specific Question:   Diet:     Answer:   Diabetic [3]     Physical Exam   Constitutional: He is oriented to person, place, and time. He appears well-developed and well-nourished. No distress.   HENT:   Head: Normocephalic and atraumatic.   Mouth/Throat: Oropharynx is clear and moist. No oropharyngeal exudate.   Eyes: Pupils are equal, round, and reactive to light. EOM are normal. Right eye exhibits no discharge. Left eye exhibits no discharge. No scleral icterus.   Neck: Normal range of motion. Neck supple. No thyromegaly present.   Cardiovascular: Normal rate and regular rhythm.  Exam reveals no gallop and no friction rub.    No murmur heard.  Pulmonary/Chest: Effort normal and breath sounds normal. He has no wheezes. He has no rales. He exhibits no tenderness.   Abdominal: Soft. Bowel sounds are normal. There is no tenderness. There is no rebound and no guarding.   Musculoskeletal: Normal range of motion. He exhibits no edema or tenderness.   left foot, dressing intact, CDI.   Lymphadenopathy:     He has no cervical adenopathy.   Neurological: He is alert and oriented to person, place, and time. No cranial nerve deficit. Coordination normal.   Skin: Skin is warm and dry. No rash noted. He is not diaphoretic. No erythema.   Psychiatric: He has a normal mood and affect. His behavior is normal. Thought content normal.   Vitals reviewed.         Recent Labs       10/08/18   0327   WBC  10.0   RBC  4.04*   HEMOGLOBIN  12.4*   HEMATOCRIT  36.7*   MCV  90.8   MCH  30.7   MCHC  33.8   RDW  41.2   PLATELETCT  347   MPV  10.5     Recent Labs      10/08/18   0327   SODIUM  137   POTASSIUM  3.8   CHLORIDE  103   CO2  25   GLUCOSE  135*   BUN  31*   CREATININE  0.87   CALCIUM  8.9                      Assessment/Plan     * Diabetic foot infection, with 4th left toe osteomyelitis (HCC)- (present on admission)   Assessment & Plan    -s/p fourth toe amputation left foot on 9/29/2018. Blood cultures remain negative.  Wound culture Streptococcus agalactiae (Group B)  -continue extended course of IV antibiotics until 10/27/2018.  Unfortunately, he does not have a payer source for SNF for IV antibiotics, and thus will need to complete course of IV antibiotics in the hospital.  -Continue blood glucose control, goal<150.  -LPS following.         Amputated toe of left foot (HCC)   Assessment & Plan    -4th toe by Dr. Lopez 9/29/2018  -continue PT/OT while in-house.   -Continue extended course of IV antibiotics per ID.         Essential hypertension- (present on admission)   Assessment & Plan    -Good control.  Continue home dose lisinopril, hydrochlorothiazide.  Continue metoprolol.  Continue to monitor blood pressure closely.        Dyslipidemia- (present on admission)   Assessment & Plan    -Continue home dose Mevacor.        Type 2 diabetes mellitus with complication, without long-term current use of insulin (HCC)- (present on admission)   Assessment & Plan    -good BG control. HbA1c 8.3.   -Continue metformin, and glyburide, along with sliding scale insulin coverage.  Accu-Chek before meals and at bedtime.  Diabetic diet.          Quality-Core Measures   Reviewed items::  Labs reviewed, Medications reviewed and Radiology images reviewed  Rivera catheter::  No Rivera  DVT prophylaxis pharmacological::  Heparin  Antibiotics:  Treating active infection/contamination beyond 24 hours  perioperative coverage  Assessed for rehabilitation services:  Patient was assess for and/or received rehabilitation services during this hospitalization

## 2018-10-09 NOTE — PROGRESS NOTES
POD#11  S/P Toe amputation and I&D  ABX per ID  WBAT through heel  Leave sutures in place x 4 weeks

## 2018-10-10 LAB
GLUCOSE BLD-MCNC: 111 MG/DL (ref 65–99)
GLUCOSE BLD-MCNC: 136 MG/DL (ref 65–99)
GLUCOSE BLD-MCNC: 263 MG/DL (ref 65–99)
GLUCOSE BLD-MCNC: 285 MG/DL (ref 65–99)

## 2018-10-10 PROCEDURE — 160036 HCHG PACU - EA ADDL 30 MINS PHASE I: Performed by: ORTHOPAEDIC SURGERY

## 2018-10-10 PROCEDURE — 700105 HCHG RX REV CODE 258: Performed by: NURSE PRACTITIONER

## 2018-10-10 PROCEDURE — G8979 MOBILITY GOAL STATUS: HCPCS | Mod: CI

## 2018-10-10 PROCEDURE — 160038 HCHG SURGERY MINUTES - EA ADDL 1 MIN LEVEL 2: Performed by: ORTHOPAEDIC SURGERY

## 2018-10-10 PROCEDURE — 700102 HCHG RX REV CODE 250 W/ 637 OVERRIDE(OP): Performed by: NURSE PRACTITIONER

## 2018-10-10 PROCEDURE — A6223 GAUZE >16<=48 NO W/SAL W/O B: HCPCS | Performed by: ORTHOPAEDIC SURGERY

## 2018-10-10 PROCEDURE — 82962 GLUCOSE BLOOD TEST: CPT

## 2018-10-10 PROCEDURE — 500891 HCHG PACK, ORTHO MAJOR: Performed by: ORTHOPAEDIC SURGERY

## 2018-10-10 PROCEDURE — 700102 HCHG RX REV CODE 250 W/ 637 OVERRIDE(OP): Performed by: HOSPITALIST

## 2018-10-10 PROCEDURE — 160009 HCHG ANES TIME/MIN: Performed by: ORTHOPAEDIC SURGERY

## 2018-10-10 PROCEDURE — A9270 NON-COVERED ITEM OR SERVICE: HCPCS | Performed by: NURSE PRACTITIONER

## 2018-10-10 PROCEDURE — 99233 SBSQ HOSP IP/OBS HIGH 50: CPT | Performed by: INTERNAL MEDICINE

## 2018-10-10 PROCEDURE — 0Y6N0ZD DETACHMENT AT LEFT FOOT, PARTIAL 4TH RAY, OPEN APPROACH: ICD-10-PCS | Performed by: ORTHOPAEDIC SURGERY

## 2018-10-10 PROCEDURE — 501838 HCHG SUTURE GENERAL: Performed by: ORTHOPAEDIC SURGERY

## 2018-10-10 PROCEDURE — 160002 HCHG RECOVERY MINUTES (STAT): Performed by: ORTHOPAEDIC SURGERY

## 2018-10-10 PROCEDURE — 160048 HCHG OR STATISTICAL LEVEL 1-5: Performed by: ORTHOPAEDIC SURGERY

## 2018-10-10 PROCEDURE — 700111 HCHG RX REV CODE 636 W/ 250 OVERRIDE (IP): Performed by: NURSE PRACTITIONER

## 2018-10-10 PROCEDURE — 700111 HCHG RX REV CODE 636 W/ 250 OVERRIDE (IP)

## 2018-10-10 PROCEDURE — 99232 SBSQ HOSP IP/OBS MODERATE 35: CPT | Performed by: INTERNAL MEDICINE

## 2018-10-10 PROCEDURE — 160027 HCHG SURGERY MINUTES - 1ST 30 MINS LEVEL 2: Performed by: ORTHOPAEDIC SURGERY

## 2018-10-10 PROCEDURE — 770006 HCHG ROOM/CARE - MED/SURG/GYN SEMI*

## 2018-10-10 PROCEDURE — 160035 HCHG PACU - 1ST 60 MINS PHASE I: Performed by: ORTHOPAEDIC SURGERY

## 2018-10-10 PROCEDURE — A9270 NON-COVERED ITEM OR SERVICE: HCPCS | Performed by: HOSPITALIST

## 2018-10-10 PROCEDURE — G8980 MOBILITY D/C STATUS: HCPCS | Mod: CI

## 2018-10-10 RX ORDER — ONDANSETRON 2 MG/ML
4 INJECTION INTRAMUSCULAR; INTRAVENOUS
Status: DISCONTINUED | OUTPATIENT
Start: 2018-10-10 | End: 2018-10-10 | Stop reason: HOSPADM

## 2018-10-10 RX ORDER — DIPHENHYDRAMINE HYDROCHLORIDE 50 MG/ML
12.5 INJECTION INTRAMUSCULAR; INTRAVENOUS
Status: DISCONTINUED | OUTPATIENT
Start: 2018-10-10 | End: 2018-10-10 | Stop reason: HOSPADM

## 2018-10-10 RX ORDER — HYDROMORPHONE HYDROCHLORIDE 2 MG/ML
0.4 INJECTION, SOLUTION INTRAMUSCULAR; INTRAVENOUS; SUBCUTANEOUS
Status: DISCONTINUED | OUTPATIENT
Start: 2018-10-10 | End: 2018-10-10 | Stop reason: HOSPADM

## 2018-10-10 RX ORDER — LABETALOL HYDROCHLORIDE 5 MG/ML
5 INJECTION, SOLUTION INTRAVENOUS
Status: DISCONTINUED | OUTPATIENT
Start: 2018-10-10 | End: 2018-10-10 | Stop reason: HOSPADM

## 2018-10-10 RX ORDER — OXYCODONE HYDROCHLORIDE 5 MG/1
10 TABLET ORAL
Status: DISCONTINUED | OUTPATIENT
Start: 2018-10-10 | End: 2018-10-10 | Stop reason: HOSPADM

## 2018-10-10 RX ORDER — HYDROMORPHONE HYDROCHLORIDE 2 MG/ML
0.2 INJECTION, SOLUTION INTRAMUSCULAR; INTRAVENOUS; SUBCUTANEOUS
Status: DISCONTINUED | OUTPATIENT
Start: 2018-10-10 | End: 2018-10-10 | Stop reason: HOSPADM

## 2018-10-10 RX ORDER — OXYCODONE HYDROCHLORIDE 5 MG/1
5 TABLET ORAL
Status: DISCONTINUED | OUTPATIENT
Start: 2018-10-10 | End: 2018-10-10 | Stop reason: HOSPADM

## 2018-10-10 RX ORDER — OXYCODONE HCL 5 MG/5 ML
5 SOLUTION, ORAL ORAL
Status: DISCONTINUED | OUTPATIENT
Start: 2018-10-10 | End: 2018-10-10 | Stop reason: HOSPADM

## 2018-10-10 RX ORDER — HALOPERIDOL 5 MG/ML
1 INJECTION INTRAMUSCULAR
Status: DISCONTINUED | OUTPATIENT
Start: 2018-10-10 | End: 2018-10-10 | Stop reason: HOSPADM

## 2018-10-10 RX ORDER — METOPROLOL TARTRATE 1 MG/ML
1 INJECTION, SOLUTION INTRAVENOUS
Status: DISCONTINUED | OUTPATIENT
Start: 2018-10-10 | End: 2018-10-10 | Stop reason: HOSPADM

## 2018-10-10 RX ORDER — OXYCODONE HCL 5 MG/5 ML
10 SOLUTION, ORAL ORAL
Status: DISCONTINUED | OUTPATIENT
Start: 2018-10-10 | End: 2018-10-10 | Stop reason: HOSPADM

## 2018-10-10 RX ORDER — HYDROMORPHONE HYDROCHLORIDE 2 MG/ML
0.1 INJECTION, SOLUTION INTRAMUSCULAR; INTRAVENOUS; SUBCUTANEOUS
Status: DISCONTINUED | OUTPATIENT
Start: 2018-10-10 | End: 2018-10-10 | Stop reason: HOSPADM

## 2018-10-10 RX ORDER — HYDRALAZINE HYDROCHLORIDE 20 MG/ML
5 INJECTION INTRAMUSCULAR; INTRAVENOUS
Status: DISCONTINUED | OUTPATIENT
Start: 2018-10-10 | End: 2018-10-10 | Stop reason: HOSPADM

## 2018-10-10 RX ORDER — MEPERIDINE HYDROCHLORIDE 25 MG/ML
12.5 INJECTION INTRAMUSCULAR; INTRAVENOUS; SUBCUTANEOUS
Status: DISCONTINUED | OUTPATIENT
Start: 2018-10-10 | End: 2018-10-10 | Stop reason: HOSPADM

## 2018-10-10 RX ADMIN — METFORMIN HYDROCHLORIDE 850 MG: 850 TABLET ORAL at 17:22

## 2018-10-10 RX ADMIN — STANDARDIZED SENNA CONCENTRATE AND DOCUSATE SODIUM 2 TABLET: 8.6; 5 TABLET ORAL at 17:22

## 2018-10-10 RX ADMIN — METOPROLOL TARTRATE 25 MG: 25 TABLET, FILM COATED ORAL at 06:24

## 2018-10-10 RX ADMIN — AMPICILLIN SODIUM AND SULBACTAM SODIUM 3 G: 2; 1 INJECTION, POWDER, FOR SOLUTION INTRAMUSCULAR; INTRAVENOUS at 01:08

## 2018-10-10 RX ADMIN — AMPICILLIN SODIUM AND SULBACTAM SODIUM 3 G: 2; 1 INJECTION, POWDER, FOR SOLUTION INTRAMUSCULAR; INTRAVENOUS at 17:21

## 2018-10-10 RX ADMIN — INSULIN HUMAN 5 UNITS: 100 INJECTION, SOLUTION PARENTERAL at 17:22

## 2018-10-10 RX ADMIN — LISINOPRIL 30 MG: 10 TABLET ORAL at 06:24

## 2018-10-10 RX ADMIN — INSULIN HUMAN 5 UNITS: 100 INJECTION, SOLUTION PARENTERAL at 20:29

## 2018-10-10 RX ADMIN — AMPICILLIN SODIUM AND SULBACTAM SODIUM 3 G: 2; 1 INJECTION, POWDER, FOR SOLUTION INTRAMUSCULAR; INTRAVENOUS at 12:13

## 2018-10-10 RX ADMIN — HYDROCHLOROTHIAZIDE 12.5 MG: 12.5 TABLET ORAL at 06:24

## 2018-10-10 RX ADMIN — AMPICILLIN SODIUM AND SULBACTAM SODIUM 3 G: 2; 1 INJECTION, POWDER, FOR SOLUTION INTRAMUSCULAR; INTRAVENOUS at 06:25

## 2018-10-10 ASSESSMENT — ENCOUNTER SYMPTOMS
SENSORY CHANGE: 1
SORE THROAT: 0
CHILLS: 0
MYALGIAS: 1
DIARRHEA: 0
CONSTIPATION: 0
COUGH: 0
HEADACHES: 0
ABDOMINAL PAIN: 0
VOMITING: 0
SHORTNESS OF BREATH: 0
NAUSEA: 0
FEVER: 0

## 2018-10-10 ASSESSMENT — PAIN SCALES - GENERAL
PAINLEVEL_OUTOF10: 5
PAINLEVEL_OUTOF10: 0
PAINLEVEL_OUTOF10: 5
PAINLEVEL_OUTOF10: ASSUMED PAIN PRESENT
PAINLEVEL_OUTOF10: 5

## 2018-10-10 NOTE — PROGRESS NOTES
" LIMB PRESERVATION SERVICE      HPI: 62 y.o. male, with a past medical history that includes uncontrolled type 2 diabetes and HTN, admitted 9/29/2018 for Sepsis (HCC).   LPS has been consulted for post op care of his amp site of the Left 4th toe.This wound started in early September of 2018 when some furniture was dropped on his left foot. Then a few days prior to admission the pt noticed severe swelling and the left fourth toe turning black.    SURGERY DATE: 9/29/18    PROCEDURE:   L 4th toe amputation through MP joint and I & D dorsal foot abscess by Dr. Lopez         Patient denies fevers, chills, nausea, vomiting.  Pain well controlled.   /73   Pulse 62   Temp 36.7 °C (98 °F)   Resp 16   Ht 1.74 m (5' 8.5\")   Wt 78.1 kg (172 lb 2.9 oz)   SpO2 93%   BMI 25.80 kg/m²     SURGICAL SITE ASSESSMENT:     incision has dehisced. Erythema and edema remain. Purulent drainage, drainage has not decreased. Moderate amount daily, causing maceration to 5th toe  Able to palpate L DP+2, unable to palpate L PT.   Lateral dorsal foot hot to touch    DIABETES MANAGEMENT:  Blood glucose: 134  A1c: 8.3 (date 9/30/18)  Diabetes education  Seen 10/1    INFECTION MANAGEMENT:  WBC: 10, decreased since admit  Wound culture results: polymicrobial   Antibiotics: Per ID    MOBILITY  Weight Bearing Status: HWB LLE  PT Consult: seen 10/9  Offloading: offloading shoe    PLAN:    Wound care: continue current dressings    Offloading: offloading shoe    Antibiotics: per ID    Plan to return to O.R.: yes. NPO at midnight for I & D of L dorsal foot with VAC with Dr. Lopez     DISCHARGE PLAN:    Disposition: home                If any questions or concerns, please call [phone]      "

## 2018-10-10 NOTE — FACE TO FACE
Face to Face Supporting Documentation - Home Health    The encounter with this patient was in whole or in part the primary reason for home health admission.    Date of encounter:   Patient:                    MRN:                       YOB: 2016  Sumit Estrada  [id number]  1955     Home health to see patient for:  Skilled Nursing care for assessment, interventions & education, Wound Care, Physical Therapy evaluation and treatment and Occupational therapy evaluation and treatment    Skilled need for:  Surgical Aftercare diabetic foot infection    Skilled nursing interventions to include:  Comment:   Skilled nursing care for assessment, intervention and education.     Homebound status evidenced by:  Needs the assistance of another person in order to leave the home. Leaving home requires a considerable and taxing effort. There is a normal inability to leave the home.    Community Physician to provide follow up care: Pcp Pt States None     Optional Interventions? No      I certify the face to face encounter for this home health care referral meets the CMS requirements and the encounter/clinical assessment with the patient was, in whole, or in part, for the medical condition(s) listed above, which is the primary reason for home health care. Based on my clinical findings: the service(s) are medically necessary, support the need for home health care, and the homebound criteria are met.  I certify that this patient has had a face to face encounter by myself.  Ridge Garcia M.D. - NPI: [id number]

## 2018-10-10 NOTE — DIETARY
Nutrition services: consult received for DM diet education.  Pt provided diet education per previous consult on 10/1.  Please see progress note as well a documentation under education tab.

## 2018-10-10 NOTE — PROGRESS NOTES
Patient arrived tot he unit at 1345. VSS PICC patent, on RA per pt request. Saturating at 96%. Assessment complete and patient is at baseline.  Pain is tolerable and patient is refusing intervention. Voided 650 ml, ambulated to chair with boot on.

## 2018-10-10 NOTE — PROGRESS NOTES
[facility] Hospitalist Progress Note    Date of Service: 10/10/2018    Chief Complaint  62 y.o. male with diabetes mellitus, hypertension, hyperlipidemia, admitted 2018 with redness and swelling of the left foot after dropping some furniture on his foot 3 weeks prior to admission.  Noted to have marked leukocytosis, in setting of infected diabetic foot.  Gas was seen on the x-ray consistent with active infection.  Patient was started on antibiotics, and orthopedics was consulted and patient subsequently underwent left fourth toe amputation through MP joint, with irrigation and debridement of multiloculated dorsal foot abscess.  OR cultures grew BGS, Streptococcus viridans, MSSA and Bacteroides vulgatus.  Pathology showed acute osteomyelitis of the left fourth toe. ID was consulted, and recommended extended course of IV antibiotics with IV Unasyn until 10/27/18.  WBC has normalized. Apparently, no payor source for SNF for IV antibiotics, and so will need to complete antibiotics in the hospital.    Interval Problem Update  10/10/2018 - uneventful night. VSS. Afebrile. Saturating well on RA. .  PT/OT recommending discharge to home with home health once completed antibiotics.  Orthopedics planning for I&D of the left dorsal foot with VAC placement today.    > I have personally seen and examined the patient today.  No complaints of pain.  No nausea, vomiting, chest pain, shortness of breath.  Had regular bowel movement last night.        Consultants/Specialty  ID  Orthopedics    Disposition  Monitor on the floors.  Needs to complete course of IV antibiotics, then home with home health.        ROS     Pertinent positives/negatives as mentioned above.     A complete review of systems was personally done by me. All other systems were negative.        Physical Exam  Laboratory/Imaging   Hemodynamics  Temp (24hrs), Av.8 °C (98.2 °F), Min:36.5 °C (97.7 °F), Max:37 °C (98.6 °F)   Temperature: 36.5 °C (97.7  °F)  Pulse  Av.5  Min: 62  Max: 121    Blood Pressure: 142/83      Respiratory      Respiration: 16, Pulse Oximetry: 96 %             Fluids    Intake/Output Summary (Last 24 hours) at 10/10/18 0746  Last data filed at 10/09/18 1600   Gross per 24 hour   Intake              700 ml   Output                0 ml   Net              700 ml       Nutrition  Orders Placed This Encounter   Procedures   • Diet NPO     Standing Status:   Standing     Number of Occurrences:   8     Order Specific Question:   Restrict to:     Answer:   Sips with Medications [3]     Physical Exam   Constitutional: He is oriented to person, place, and time. He appears well-developed and well-nourished. No distress.   HENT:   Head: Normocephalic and atraumatic.   Mouth/Throat: Oropharynx is clear and moist. No oropharyngeal exudate.   Eyes: Pupils are equal, round, and reactive to light. EOM are normal. Right eye exhibits no discharge. Left eye exhibits no discharge. No scleral icterus.   Neck: Normal range of motion. Neck supple. No thyromegaly present.   Cardiovascular: Normal rate and regular rhythm.  Exam reveals no gallop and no friction rub.    No murmur heard.  Pulmonary/Chest: Effort normal and breath sounds normal. He has no wheezes. He has no rales. He exhibits no tenderness.   Abdominal: Soft. Bowel sounds are normal. There is no tenderness. There is no rebound and no guarding.   Musculoskeletal:   Left foot dressing clean dry and intact, no tenderness   Lymphadenopathy:     He has no cervical adenopathy.   Neurological: He is alert and oriented to person, place, and time. No cranial nerve deficit. Coordination normal.   Skin: Skin is warm and dry. No rash noted. He is not diaphoretic. No erythema.   Psychiatric: He has a normal mood and affect. His behavior is normal. Thought content normal.   Vitals reviewed.         Recent Labs      10/08/18   0327   WBC  10.0   RBC  4.04*   HEMOGLOBIN  12.4*   HEMATOCRIT  36.7*   MCV  90.8    MCH  30.7   MCHC  33.8   RDW  41.2   PLATELETCT  347   MPV  10.5     Recent Labs      10/08/18   0327   SODIUM  137   POTASSIUM  3.8   CHLORIDE  103   CO2  25   GLUCOSE  135*   BUN  31*   CREATININE  0.87   CALCIUM  8.9                      Assessment/Plan     * Diabetic foot infection, with 4th left toe osteomyelitis (HCC)- (present on admission)   Assessment & Plan    -s/p fourth toe amputation left foot on 9/29/2018. Blood cultures remain negative.  Wound culture Streptococcus agalactiae (Group B)  - for I&D, wound vac placement in OR today.  -continue extended course of IV unasyn until 10/27/2018.  No payor source for SNF for IV antibiotics, and thus will need to complete course of IV antibiotics in the hospital.  -Continue blood glucose control, goal<150.  -LPS following.         Amputated toe of left foot (HCC)   Assessment & Plan    -4th toe by Dr. Lopez 9/29/2018  -continue PT/OT while in-house.   -Continue extended course of IV antibiotics per ID.         Essential hypertension- (present on admission)   Assessment & Plan    -Fair control.  Continue home dose lisinopril, hydrochlorothiazide.  Continue metoprolol.  Continue to monitor blood pressure closely as may need dose adjustments if blood pressure remains on the higher side.        Dyslipidemia- (present on admission)   Assessment & Plan    -Continue home dose Mevacor.        Type 2 diabetes mellitus with complication, without long-term current use of insulin (HCC)- (present on admission)   Assessment & Plan    -Maintaining good BG control. HbA1c 8.3.   -Continue metformin, and glyburide, along with sliding scale insulin coverage.  Accu-Chek before meals and at bedtime.  Diabetic diet.          Quality-Core Measures   Reviewed items::  Labs reviewed, Medications reviewed and Radiology images reviewed  Rivera catheter::  No Rivera  DVT prophylaxis pharmacological::  Heparin  Antibiotics:  Treating active infection/contamination beyond 24 hours  perioperative coverage  Assessed for rehabilitation services:  Patient was assess for and/or received rehabilitation services during this hospitalization

## 2018-10-10 NOTE — OP REPORT
DATE OF SERVICE:  10/10/2018    PREOPERATIVE DIAGNOSIS:  Osteomyelitis, left foot.    POSTOPERATIVE DIAGNOSIS:  Osteomyelitis, left foot.    PROCEDURE:  Left fourth ray resection.    SURGEON:  Trip Ventura MD    ASSISTANT:  Lee Miramontes PA-C.    ESTIMATED BLOOD LOSS:  Minimal.    INDICATIONS:  This is a 62-year-old gentleman who previously had a fourth toe   amputation with abscess drainage, who has had persistent wound issues and   continued drainage despite IV antibiotics and tight blood sugar control.    Risks and benefits of repeat irrigation, debridement and more bony resection   were discussed at length which include but not limited to bleeding, infection,   neurovascular damage, pain, stiffness, and need for further surgery,   including more proximal amputation.  He understands all these risks and wished   to proceed.    DESCRIPTION OF PROCEDURE:  Patient was sedated with LMA anesthesia and   administered preoperative antibiotics.  Left lower extremity was prepped and   draped in sterile fashion.  His previous incision was reopened.  All necrotic   tissue was debrided.  The metatarsal was quite soft indicating osteomyelitic   change and the metatarsal was removed up to his proximal third.  This allowed   for the wounds then be irrigated and debrided in excisional fashion with a   knife and rongeur down to skin, subcutaneous tissue, and underlying muscle,   and bone and then irrigated with copious amounts of normal saline solution and   closed in layers with PDS suture and nylon suture.  Sterile dressings were   applied.  Patient tolerated the procedure well.    POSTOPERATIVE PLAN:  For the patient to be return to [facility] of medicine service   for tight blood sugar control and observation by the infectious disease   service as well.       ____________________________________     TRIP VENTURA MD    ALVIN / NTS    DD:  10/10/2018 10:53:15  DT:  10/10/2018 11:11:43    D#:  [id number]  Job#:  [id number]

## 2018-10-10 NOTE — OR NURSING
"Patient A&Ox4 with a flat affect. Pt has 5/10 pain and refusing pain medication, states \"he only feels comfortable with aspirin\" pt also did not want to take any aspirin at this time. No nausea reported. VSS.  Left foot has 4x4. Xeroform, soft roll and ace bandage. Dressing is CDI.     FSBS 136 prior to case and MD at bedside did not want to recheck during PACU stay.     Pt was offered water, but did not want anything to drink.    Pt S/O was called and updated on pt status.     Will continue to monitor  "

## 2018-10-11 LAB
GLUCOSE BLD-MCNC: 159 MG/DL (ref 65–99)
GLUCOSE BLD-MCNC: 163 MG/DL (ref 65–99)
GLUCOSE BLD-MCNC: 173 MG/DL (ref 65–99)

## 2018-10-11 PROCEDURE — 99232 SBSQ HOSP IP/OBS MODERATE 35: CPT | Performed by: INTERNAL MEDICINE

## 2018-10-11 PROCEDURE — 700105 HCHG RX REV CODE 258

## 2018-10-11 PROCEDURE — 700105 HCHG RX REV CODE 258: Performed by: INTERNAL MEDICINE

## 2018-10-11 PROCEDURE — A9270 NON-COVERED ITEM OR SERVICE: HCPCS | Performed by: INTERNAL MEDICINE

## 2018-10-11 PROCEDURE — 82962 GLUCOSE BLOOD TEST: CPT | Mod: 91

## 2018-10-11 PROCEDURE — A9270 NON-COVERED ITEM OR SERVICE: HCPCS | Performed by: NURSE PRACTITIONER

## 2018-10-11 PROCEDURE — 700102 HCHG RX REV CODE 250 W/ 637 OVERRIDE(OP): Performed by: NURSE PRACTITIONER

## 2018-10-11 PROCEDURE — 700111 HCHG RX REV CODE 636 W/ 250 OVERRIDE (IP): Performed by: HOSPITALIST

## 2018-10-11 PROCEDURE — 700111 HCHG RX REV CODE 636 W/ 250 OVERRIDE (IP): Performed by: INTERNAL MEDICINE

## 2018-10-11 PROCEDURE — 770006 HCHG ROOM/CARE - MED/SURG/GYN SEMI*

## 2018-10-11 PROCEDURE — 700102 HCHG RX REV CODE 250 W/ 637 OVERRIDE(OP): Performed by: INTERNAL MEDICINE

## 2018-10-11 RX ORDER — HYDROCHLOROTHIAZIDE 12.5 MG/1
12.5 TABLET ORAL
Status: DISCONTINUED | OUTPATIENT
Start: 2018-10-11 | End: 2018-10-16 | Stop reason: HOSPADM

## 2018-10-11 RX ORDER — SODIUM CHLORIDE 9 MG/ML
INJECTION, SOLUTION INTRAVENOUS
Status: COMPLETED
Start: 2018-10-11 | End: 2018-10-11

## 2018-10-11 RX ORDER — LISINOPRIL 20 MG/1
20 TABLET ORAL
Status: DISCONTINUED | OUTPATIENT
Start: 2018-10-11 | End: 2018-10-13

## 2018-10-11 RX ADMIN — LOVASTATIN 40 MG: 20 TABLET ORAL at 06:00

## 2018-10-11 RX ADMIN — GLIMEPIRIDE 4 MG: 4 TABLET ORAL at 06:06

## 2018-10-11 RX ADMIN — INSULIN HUMAN 2 UNITS: 100 INJECTION, SOLUTION PARENTERAL at 06:04

## 2018-10-11 RX ADMIN — HEPARIN SODIUM 5000 UNITS: 5000 INJECTION, SOLUTION INTRAVENOUS; SUBCUTANEOUS at 15:26

## 2018-10-11 RX ADMIN — HEPARIN SODIUM 5000 UNITS: 5000 INJECTION, SOLUTION INTRAVENOUS; SUBCUTANEOUS at 21:08

## 2018-10-11 RX ADMIN — AMPICILLIN SODIUM AND SULBACTAM SODIUM 3 G: 2; 1 INJECTION, POWDER, FOR SOLUTION INTRAMUSCULAR; INTRAVENOUS at 11:51

## 2018-10-11 RX ADMIN — METFORMIN HYDROCHLORIDE 850 MG: 850 TABLET ORAL at 11:51

## 2018-10-11 RX ADMIN — AMPICILLIN SODIUM AND SULBACTAM SODIUM 3 G: 2; 1 INJECTION, POWDER, FOR SOLUTION INTRAMUSCULAR; INTRAVENOUS at 00:37

## 2018-10-11 RX ADMIN — LISINOPRIL 20 MG: 20 TABLET ORAL at 15:27

## 2018-10-11 RX ADMIN — HEPARIN SODIUM 5000 UNITS: 5000 INJECTION, SOLUTION INTRAVENOUS; SUBCUTANEOUS at 06:00

## 2018-10-11 RX ADMIN — INSULIN HUMAN 2 UNITS: 100 INJECTION, SOLUTION PARENTERAL at 11:53

## 2018-10-11 RX ADMIN — AMPICILLIN SODIUM AND SULBACTAM SODIUM 3 G: 2; 1 INJECTION, POWDER, FOR SOLUTION INTRAMUSCULAR; INTRAVENOUS at 06:00

## 2018-10-11 RX ADMIN — INSULIN HUMAN 2 UNITS: 100 INJECTION, SOLUTION PARENTERAL at 21:08

## 2018-10-11 RX ADMIN — METFORMIN HYDROCHLORIDE 850 MG: 850 TABLET ORAL at 08:50

## 2018-10-11 RX ADMIN — HYDROCHLOROTHIAZIDE 12.5 MG: 12.5 TABLET ORAL at 15:27

## 2018-10-11 RX ADMIN — SODIUM CHLORIDE 500 ML: 9 INJECTION, SOLUTION INTRAVENOUS at 17:49

## 2018-10-11 RX ADMIN — AMPICILLIN SODIUM AND SULBACTAM SODIUM 3 G: 2; 1 INJECTION, POWDER, FOR SOLUTION INTRAMUSCULAR; INTRAVENOUS at 17:51

## 2018-10-11 RX ADMIN — METFORMIN HYDROCHLORIDE 850 MG: 850 TABLET ORAL at 17:52

## 2018-10-11 ASSESSMENT — ENCOUNTER SYMPTOMS
HEADACHES: 0
SHORTNESS OF BREATH: 0
VOMITING: 0
ABDOMINAL PAIN: 0
PALPITATIONS: 0
MYALGIAS: 0
WEAKNESS: 0
NAUSEA: 0
DIARRHEA: 0
FEVER: 0
CHILLS: 0
SENSORY CHANGE: 1

## 2018-10-11 ASSESSMENT — PAIN SCALES - GENERAL: PAINLEVEL_OUTOF10: 0

## 2018-10-11 NOTE — DISCHARGE PLANNING
"Anticipated Discharge Disposition: TBD    Action: LSW met w/ pt at bedside at pt's request to discuss worker's comp. Pt stated that pt's injury was due to a chair running over pt's toe at work. Pt reports to not thinking anything of it and thought the pain would resolve. After a week, pt's toe began to have \"excruciating pain.\" Pt went to [employer] [facility] and [employer] [facility] found that pt's toe had become \"cold\" and to go to the ER immediately. Pt did so and that led to this inpatient hospitalization. Pt did not inform ER of injury occurring at place of employment and let this LSW know today. Pt has informed place of employment ([facility] [employer]) of the incident at work that led to this hospitalization. Pt's place of employment requested a C-4 be completed.    LSW called [facility] to inquire who to speak w/ about pt's C-4 form completion. [facility] (Darshana) was very helpful and called ER to complete C-4 w/ pt.     Barriers to Discharge: None    Plan: LSW to inform pt. TBD.    "

## 2018-10-11 NOTE — DISCHARGE PLANNING
Care Transition Team Assessment    Information Source  Orientation : Oriented x 4  Information Given By: Patient  Informant's Name: Sumit  Who is responsible for making decisions for patient? : Patient    Readmission Evaluation  Is this a readmission?: No    Elopement Risk  Legal Hold: No  Ambulatory or Self Mobile in Wheelchair: Yes  Disoriented: No  Psychiatric Symptoms: None  History of Wandering: No  Elopement this Admit: No  Vocalizing Wanting to Leave: No  Displays Behaviors, Body Language Wanting to Leave: No-Not at Risk for Elopement  Elopement Risk: Not at Risk for Elopement    Interdisciplinary Discharge Planning  Does Admitting Nurse Feel This Could be a Complex Discharge?: No  Lives with - Patient's Self Care Capacity: Significant Other  Patient or legal guardian wants to designate a caregiver (see row info): No  Housing / Facility: 1 Story Apartment / Condo  Do You Take your Prescribed Medications Regularly: Yes  Able to Return to Previous ADL's: Yes  Mobility Issues: Yes  Prior Services: Home-Independent  Assistance Needed: No  Durable Medical Equipment: Not Applicable    Discharge Preparedness  What is your plan after discharge?: Uncertain - pending medical team collaboration  What are your discharge supports?: Partner, Sibling  Prior Functional Level: Ambulatory, Drives Self, Independent with Activities of Daily Living, Independent with Medication Management  Difficulity with ADLs: None  Difficulity with IADLs: None    Functional Assesment  Prior Functional Level: Ambulatory, Drives Self, Independent with Activities of Daily Living, Independent with Medication Management    Finances  Financial Barriers to Discharge: No  Prescription Coverage: Yes    Vision / Hearing Impairment  Vision Impairment : No  Hearing Impairment : No    Values / Beliefs / Concerns  Values / Beliefs Concerns : No  Special Hospitalization Concerns: n/a         Domestic Abuse  Have you ever been the victim of abuse or  violence?: No  Physical Abuse or Sexual Abuse: No  Verbal Abuse or Emotional Abuse: No  Possible Abuse Reported to:: Not Applicable    Psychological Assessment  History of Substance Abuse: None  History of Psychiatric Problems: No  Non-compliant with Treatment: No  Newly Diagnosed Illness: Yes    Discharge Risks or Barriers  Discharge risks or barriers?: No PCP  Patient risk factors: No PCP    Anticipated Discharge Information  Anticipated discharge disposition: [facility], Home      Anticipated d/c disposition: Home w/ HH    Action: LSW met w/ pt at bedside and attained the following information used in this assessment. Pt verified accuracy of facesheet. Pt lives in a 1 story apartment w/ significant other, Sahara Nogueira. Pt uses [facility] on [address] [place] [address]. Prior to hospitalization, pt was independent in ADL's and IADL's. Pt drives and is able to attend necessary MD appointments. Pt has no financial concerns. Pt has good support system and significant other, Sahara Nogueira, is at bedside. Pt denies substance abuse and report to not having a hx of mental health dx.     Barriers to d/c: No insurance    Plan: KIM

## 2018-10-11 NOTE — PROGRESS NOTES
[facility] Hospitalist Progress Note    Date of Service: 10/11/2018    Chief Complaint  62 y.o. male with diabetes mellitus, hypertension, hyperlipidemia, admitted 9/29/2018 with redness and swelling of the left foot after dropping some furniture on his foot 3 weeks prior to admission.  Noted to have marked leukocytosis, in setting of infected diabetic foot.  Gas was seen on the x-ray consistent with active infection.  Patient was started on antibiotics, and orthopedics was consulted and patient subsequently underwent left fourth toe amputation through MP joint, with irrigation and debridement of multiloculated dorsal foot abscess.  OR cultures grew BGS, Streptococcus viridans, MSSA and Bacteroides vulgatus.  Pathology showed acute osteomyelitis of the left fourth toe. ID was consulted, and recommended extended course of IV antibiotics with IV Unasyn until 10/27/18.  WBC has normalized. Apparently, no payor source for SNF for IV antibiotics, and so will need to complete antibiotics in the hospital. PT/OT recommending discharge to home with home health once completed antibiotics.     Interval Problem Update  10/11/2018 - no overnight events. Remains hemodynamically stable and afebrile. Stable on RA.  BGs overnight ran in the 200s. Patient underwent incision and drainage of the left foot yesterday.  ID recommended extension of IV antibiotics to 11/10/18 for 6 weeks given new findings of osteomyelitis noted intraoperatively.    > I have personally seen and examined the patient today.  Comfortable.  Denied any pain on the left foot.  Had bowel movement last night.  No nausea, vomiting, abdominal pain, chest pain or shortness of breath.        Consultants/Specialty  ID  Orthopedics    Disposition  Monitor on the floors.  Needs to complete course of IV antibiotics, then home with home health.        ROS     Pertinent positives/negatives as mentioned above.     A complete review of systems was personally done by me. All other  systems were negative.          Physical Exam  Laboratory/Imaging   Hemodynamics  Temp (24hrs), Av.5 °C (97.7 °F), Min:36.1 °C (96.9 °F), Max:36.8 °C (98.3 °F)   Temperature: 36.6 °C (97.8 °F)  Pulse  Av.5  Min: 62  Max: 121 Heart Rate (Monitored): 77  Blood Pressure: 158/89, NIBP: 153/74      Respiratory      Respiration: 16, Pulse Oximetry: 97 %             Fluids    Intake/Output Summary (Last 24 hours) at 10/11/18 0733  Last data filed at 10/10/18 1649   Gross per 24 hour   Intake              300 ml   Output             1800 ml   Net            -1500 ml       Nutrition  Orders Placed This Encounter   Procedures   • Diet Order Diabetic ( ADA)     Standing Status:   Standing     Number of Occurrences:   1     Order Specific Question:   Diet:     Answer:   Diabetic [3]     Comments:    ADA     Order Specific Question:   Calorie modifications:     Answer:   2000 kcals [5]     Physical Exam   Constitutional: He is oriented to person, place, and time. He appears well-developed and well-nourished. No distress.   HENT:   Head: Normocephalic and atraumatic.   Mouth/Throat: Oropharynx is clear and moist. No oropharyngeal exudate.   Eyes: Pupils are equal, round, and reactive to light. EOM are normal. Right eye exhibits no discharge. Left eye exhibits no discharge. No scleral icterus.   Neck: Normal range of motion. Neck supple. No thyromegaly present.   Cardiovascular: Normal rate and regular rhythm.  Exam reveals no gallop and no friction rub.    No murmur heard.  Pulmonary/Chest: Effort normal and breath sounds normal. He has no wheezes. He has no rales. He exhibits no tenderness.   Abdominal: Soft. Bowel sounds are normal. There is no tenderness. There is no rebound and no guarding.   Musculoskeletal: He exhibits no edema or tenderness.   Left foot dressed, CDI.  No tenderness.   Lymphadenopathy:     He has no cervical adenopathy.   Neurological: He is alert and oriented to person, place, and time. No  cranial nerve deficit. Coordination normal.   Skin: Skin is warm and dry. No rash noted. He is not diaphoretic. No erythema.   Psychiatric: He has a normal mood and affect. His behavior is normal. Thought content normal.   Vitals reviewed.                                  Assessment/Plan     * Diabetic foot infection, with 4th left toe osteomyelitis (HCC)- (present on admission)   Assessment & Plan    -s/p fourth toe amputation left foot on 9/29/2018. S/p repeat I&D on 10/10. Blood cultures remain negative.  Wound culture Streptococcus agalactiae (Group B)  -ID recommending 6 weeks of IV antibiotics. Continue IV unasyn until 11/10. No payor source for SNF for IV antibiotics, and thus will need to complete course of IV antibiotics in the hospital.  -Continue blood glucose control, goal<150.  -LPS following.         Amputated toe of left foot (HCC)   Assessment & Plan    -4th toe by Dr. Lopez 9/29/2018  -continue PT/OT while in-house.   -Continue extended course of IV antibiotics per ID.         Essential hypertension- (present on admission)   Assessment & Plan    -Fair control, elevated BP today.  Continue home dose lisinopril, hydrochlorothiazide, and metoprolol.  May need further dose adjustments if BP remains persistently elevated. Continue to monitor blood pressure closely.        Dyslipidemia- (present on admission)   Assessment & Plan    -Continue home dose Mevacor.        Type 2 diabetes mellitus with complication, without long-term current use of insulin ([facility])- (present on admission)   Assessment & Plan    -BG running high. HbA1c 8.3.   -Continue metformin, and glyburide, along with sliding scale insulin coverage. Increase glimepiride to 6mg daily.  Accu-Check before meals and at bedtime.  Diabetic diet.          Quality-Core Measures   Reviewed items::  Labs reviewed, Medications reviewed and Radiology images reviewed  Rivera catheter::  No Rivera  DVT prophylaxis pharmacological::  Heparin  Antibiotics:   Treating active infection/contamination beyond 24 hours perioperative coverage  Assessed for rehabilitation services:  Patient was assess for and/or received rehabilitation services during this hospitalization

## 2018-10-11 NOTE — PROGRESS NOTES
"   Orthopaedic PA Progress Note    Interval changes:Did well overnight, no c/o pain at rest.    ROS - Patient denies any new issues. No chest pain, dyspnea, or fever.  Pain well controlled.    Blood pressure (!) 176/87, pulse 76, temperature 36.7 °C (98.1 °F), resp. rate 16, height 1.74 m (5' 8.5\"), weight 78.1 kg (172 lb 2.9 oz), SpO2 97 %.    Patient seen and examined  No acute distress  Breathing non labored  RRR  Surgical dressing is clean, dry, and intact. Patient clearly fires tibialis anterior, EHL, and gastrocnemius/soleus. Sensation is intact to light touch throughout superficial peroneal, deep peroneal, tibial, saphenous, and sural nerve distributions. Strong and palpable 2+ dorsalis pedis and posterior tibial pulses with capillary refill less than 2 seconds. No lower leg tenderness or discomfort.    Active Hospital Problems    Diagnosis   • Diabetic foot infection, with 4th left toe osteomyelitis (HCC) [E11.628, L08.9]     Priority: High   • Amputated toe of left foot (HCC) [Z89.422]     Priority: Medium   • Type 2 diabetes mellitus with complication, without long-term current use of insulin (HCC) [E11.8]     Priority: Low   • Dyslipidemia [E78.5]     Priority: Low   • Essential hypertension [I10]     Priority: Low       Assessment/Plan:  POD#1 S/P L 4th ray resection  Wt bearing status - AT through heel  PT/OT-initiated  Wound care:dressing left in place  Drains - no  Rivera-no  Sutures/Staples out- 10-14 days post operatively  Antibiotics: per ID  DVT Prophylaxis- TEDS/SCDs/Foot pumps.   UFH 5kU q8h; Duration-until ambulatory > 150'  Future Procedures - not anticipated  Case Coordination for Discharge Planning - Disposition HH vs SNF, plan per med team, OK for DC home from Ortho standpoint tomorrow      "

## 2018-10-11 NOTE — PROGRESS NOTES
Infectious Disease Progress Note    Author: GUS Castro Date & Time of service: 10/11/2018  11:01 AM    Chief Complaint:  Left DM foot infection  Left foot abscess    Interval History:  62 y.o. Male with Left foot DM foot infection with abscess s/p 4th toe amputation and I&D.  10/2- Tm 99.7, no WBC, 7/10 L foot pain- improved with rest and pain meds, no issue with abx.  10/3- AF, WBC 10.6, Left foot sore- better than yesterday, tolerating abx.  10/4- AF, no WBC, sleeping- easily aroused, no issue with abx, generalized tenderness to L foot, had a HA this AM that is slowly resolving.  10/5-T-max 99, WBC 10.5, denies any pain to left foot, resting in bed sleeping-easily arousable, tolerating antibiotics.  10/6- Tm 99.5, no WBC, L  after ambulating, no issue with abx.  10/7-AF, no WBC, tolerating antibiotics, denies any left foot pain, reports ambulating around the hallways without issue.  10/8 AF WBC 10 patient complains of left foot pain especially with movement, tolerating IV antibiotics without any issues  10/9- Tm 99, no WBC, left foot slightly tender with ambulation, no issue with antibiotics.  10/10 AF no CBC no new issues to report, plan for I&D today with wound vac placement  10/11- AF, no WBC, denies any pain to L foot, no issue with abx, resting in chair without complaint.  Labs Reviewed, Medications Reviewed, Radiology Reviewed and Wound Reviewed.    Review of Systems:  Review of Systems   Constitutional: Negative for chills and fever.   HENT: Negative for congestion.    Respiratory: Negative for shortness of breath.    Cardiovascular: Positive for leg swelling. Negative for chest pain and palpitations.   Gastrointestinal: Negative for abdominal pain, diarrhea, nausea and vomiting.   Genitourinary: Negative for dysuria.   Musculoskeletal: Negative for joint pain and myalgias.   Skin: Negative for itching.   Neurological: Positive for sensory change. Negative for weakness and  headaches.       Hemodynamics:  Temp (24hrs), Av.6 °C (97.8 °F), Min:36.1 °C (96.9 °F), Max:36.8 °C (98.2 °F)  Temperature: 36.7 °C (98.1 °F)  Pulse  Av.4  Min: 62  Max: 121Heart Rate (Monitored): 77  Blood Pressure: (!) 176/87, NIBP: 153/74       Physical Exam:  Physical Exam   Constitutional: He is oriented to person, place, and time. He appears well-developed. No distress.   Appears older than stated age   HENT:   Head: Normocephalic and atraumatic.   Eyes: Pupils are equal, round, and reactive to light. Conjunctivae and EOM are normal. No scleral icterus.   Neck: Normal range of motion. Neck supple. No JVD present.   Cardiovascular: Normal rate, regular rhythm and normal heart sounds.  Exam reveals no gallop and no friction rub.    Pulmonary/Chest: Effort normal and breath sounds normal. No respiratory distress. He has no rales.   Abdominal: Soft. Bowel sounds are normal. He exhibits no distension. There is no rebound and no guarding.   Musculoskeletal: He exhibits edema. He exhibits no tenderness.   Left 4th toe amp site- surgical dressing, post op shoe in place.     RUE PICC- CDI, non tender, no erythema.   Neurological: He is alert and oriented to person, place, and time.   Skin: Skin is warm. No rash noted. He is not diaphoretic.   Psychiatric: He has a normal mood and affect. Thought content normal.   Nursing note and vitals reviewed.      Meds:    Current Facility-Administered Medications:   •  ampicillin-sulbactam (UNASYN) IV  •  insulin regular **AND** Accu-Chek ACHS **AND** NOTIFY MD and PharmD **AND** glucose 4 g **AND** dextrose 50%  •  aspirin  •  glimepiride  •  metFORMIN  •  lovastatin  •  senna-docusate **AND** polyethylene glycol/lytes **AND** magnesium hydroxide **AND** bisacodyl  •  heparin  •  ondansetron  •  promethazine  •  promethazine  •  prochlorperazine  •  acetaminophen    Labs:  No results for input(s): WBC, RBC, HEMOGLOBIN, HEMATOCRIT, MCV, MCH, RDW, PLATELETCT, MPV,  NEUTSPOLYS, LYMPHOCYTES, MONOCYTES, EOSINOPHILS, BASOPHILS, RBCMORPHOLO in the last 72 hours.  No results for input(s): SODIUM, POTASSIUM, CHLORIDE, CO2, GLUCOSE, BUN, CPKTOTAL in the last 72 hours.  No results for input(s): ALBUMIN, TBILIRUBIN, ALKPHOSPHAT, TOTPROTEIN, ALTSGPT, ASTSGOT, CREATININE in the last 72 hours.    Imaging:  No recent imaging.      Micro:  Results     Procedure Component Value Units Date/Time    CULTURE WOUND W/ GRAM STAIN [[id number]]  (Abnormal)  (Susceptibility) Collected:  09/29/18 1700    Order Status:  Completed Specimen:  Wound Updated:  10/05/18 1530     Significant Indicator POS (POS)     Source WND     Site Left Foot Abscess     Culture Result Wound -- (A)     Gram Stain Result Moderate WBCs.  Moderate Gram positive cocci.  Rare Gram negative rods.       Culture Result Wound Streptococcus agalactiae (Group B)  Heavy growth   (A)      Viridans Streptococcus  Moderate growth   (A)      Staphylococcus epidermidis  Light growth   (A)    Culture & Susceptibility     STAPHYLOCOCCUS EPIDERMIDIS     Antibiotic Sensitivity Microscan Unit Status    Ampicillin/sulbactam Sensitive <=8/4 mcg/mL Final    Method: SENSITIVITY, SHAKA    Clindamycin Sensitive <=0.5 mcg/mL Final    Method: SENSITIVITY, SHAKA    Daptomycin Sensitive <=0.5 mcg/mL Final    Method: SENSITIVITY, SHAKA    Erythromycin Resistant >4 mcg/mL Final    Method: SENSITIVITY, SHAKA    Moxifloxacin Sensitive <=0.5 mcg/mL Final    Method: SENSITIVITY, SHAKA    Oxacillin Sensitive <=0.25 mcg/mL Final    Method: SENSITIVITY, SHAKA    Penicillin Resistant 8 mcg/mL Final    Method: SENSITIVITY, SHAKA    Tetracycline Intermediate 8 mcg/mL Final    Method: SENSITIVITY, SHAKA    Trimeth/Sulfa Sensitive <=0.5/9.5 mcg/mL Final    Method: SENSITIVITY, SHAKA    Vancomycin Sensitive 2 mcg/mL Final    Method: SENSITIVITY, SHAKA                       ANAEROBIC CULTURE [[id number]]  (Abnormal) Collected:  09/29/18 1700    Order Status:  Completed Specimen:  Wound  "Updated:  10/05/18 1530     Significant Indicator POS (POS)     Source WND     Site Left Foot Abscess     Anaerobic Culture, Culture Res Growth noted after further incubation, see below for  organism identification.   (A)      Bacteroides vulgatus  Light growth   (A)    FUNGAL CULTURE [[id number]] Collected:  09/29/18 1700    Order Status:  Completed Specimen:  Wound Updated:  10/05/18 1530     Significant Indicator NEG     Source WND     Site Left Foot Abscess     Fungal Culture Culture in progress.    AFB CULTURE [[id number]] Collected:  09/29/18 1700    Order Status:  Completed Specimen:  Wound Updated:  10/05/18 1530     Significant Indicator NEG     Source WND     Site Left Foot Abscess     AFB Culture Culture in progress.  NOTE:  Swabs are not recommended for the isolation of  Mycobacteria, since they provide limited material.  They  are acceptable only if a specimen cannot be collected by  any other means.  Negative results obtained from these  specimens submitted on swabs are not reliable.       AFB Smear Results No acid fast bacilli seen.    BLOOD CULTURE [[id number]] Collected:  09/29/18 1228    Order Status:  Completed Specimen:  Blood from Peripheral Updated:  10/04/18 1500     Significant Indicator NEG     Source BLD     Site PERIPHERAL     Blood Culture No growth after 5 days of incubation.    Narrative:       Per Hospital Policy: Only change Specimen Src: to \"Line\" if  specified by physician order.    BLOOD CULTURE [[id number]] Collected:  09/29/18 1243    Order Status:  Completed Specimen:  Blood from Peripheral Updated:  10/04/18 1500     Significant Indicator NEG     Source BLD     Site PERIPHERAL     Blood Culture No growth after 5 days of incubation.    Narrative:       Per Hospital Policy: Only change Specimen Src: to \"Line\" if  specified by physician order.          Assessment:  Active Hospital Problems    Diagnosis   • *Amputated toe of left foot (HCC) [Z89.422]   • Diabetic foot infection (HCC) " [E11.628, L08.9]   • Type 2 diabetes mellitus with complication, without long-term current use of insulin ([facility]) [E11.8]   • Sepsis ([facility]) [A41.9]   Left foot abscess    Plan:  Left DM foot infection with abscess and new osteomyelitis  Afebrile  No leukocytosis  Bcxs on 9/29 negative  S/p L 4th toe amputation and I&D of multiloculated abscess on 9/29 with Dr. Lopez  OR cx +GBS, Strep Viridans, MSSE & bacteroides vulgatus  S/p I&D 10/10 with Dr. Mendoza- metatarsal was soft indicating osteomyelitis per the OP note. Metatarsal removed up to proximal third.  Continue IV Unasyn 3g Q6h while inpatient  Original plan was for 4 weeks IV abx but will extend to a total of 6 wks given new findings of osteomyelitis noted intraoperatively.  Estimated end date 11/10/18  LPS following, continue wound care    Uncontrolled diabetes  Hemoglobin A1c 8.3% on 9/30/18  Keep blood sugar <150 control infection and promote wound healing        Due to lack of insurance, pt will need to complete IV antibiotics inpatient.

## 2018-10-11 NOTE — PROGRESS NOTES
Infectious Disease Progress Note    Author: Renetta Beauchamp M.D. Date & Time of service: 10/10/2018  5:48 PM    Chief Complaint:  Left DM foot infection  Left foot abscess    Interval History:  62 y.o. Male with Left foot DM foot infection with abscess s/p 4th toe amputation and I&D.  10/2- Tm 99.7, no WBC, 7/10 L foot pain- improved with rest and pain meds, no issue with abx.  10/3- AF, WBC 10.6, Left foot sore- better than yesterday, tolerating abx.  10/4- AF, no WBC, sleeping- easily aroused, no issue with abx, generalized tenderness to L foot, had a HA this AM that is slowly resolving.  10/5-T-max 99, WBC 10.5, denies any pain to left foot, resting in bed sleeping-easily arousable, tolerating antibiotics.  10/6- Tm 99.5, no WBC, L  after ambulating, no issue with abx.  10/7-AF, no WBC, tolerating antibiotics, denies any left foot pain, reports ambulating around the hallways without issue.  10/8 AF WBC 10 patient complains of left foot pain especially with movement, tolerating IV antibiotics without any issues  10/9- Tm 99, no WBC, left foot slightly tender with ambulation, no issue with antibiotics.  10/10 AF no CBC no new issues to report, plan for I&D today with wound vac placement  Labs Reviewed, Medications Reviewed, Radiology Reviewed and Wound Reviewed.    Review of Systems:  Review of Systems   Constitutional: Negative for chills, fever and malaise/fatigue.   HENT: Negative for sore throat.    Respiratory: Negative for cough and shortness of breath.    Cardiovascular: Positive for leg swelling. Negative for chest pain.   Gastrointestinal: Negative for abdominal pain, constipation, diarrhea, nausea and vomiting.   Genitourinary: Negative for dysuria.   Musculoskeletal: Positive for joint pain and myalgias.        Intermittent   Skin: Negative for rash.   Neurological: Positive for sensory change. Negative for headaches.       Hemodynamics:  Temp (24hrs), Av.6 °C (97.9 °F), Min:36.2 °C  (97.2 °F), Max:37 °C (98.6 °F)  Temperature: 36.6 °C (97.9 °F)  Pulse  Av.8  Min: 62  Max: 121Heart Rate (Monitored): 77  Blood Pressure: 121/72, NIBP: 153/74       Physical Exam:  Physical Exam   Constitutional: He is oriented to person, place, and time. He appears well-developed and well-nourished. No distress.   Older than stated age   HENT:   Head: Normocephalic and atraumatic.   Eyes: Pupils are equal, round, and reactive to light. Conjunctivae and EOM are normal.   Neck: Normal range of motion. Neck supple. No tracheal deviation present.   Cardiovascular: Normal rate, regular rhythm and normal heart sounds.    No murmur heard.  Pulmonary/Chest: Effort normal and breath sounds normal. No respiratory distress. He has no wheezes.   Abdominal: Soft. Bowel sounds are normal. He exhibits no distension. There is no tenderness.   Musculoskeletal: He exhibits edema and tenderness.   Left 4th toe amp site-sutures in place, moderate maceration along incision, heavy serous drainage, no odor, mild erythema to surrounding tissue.    RUE PICC- CDI, non tender, no erythema.   Neurological: He is alert and oriented to person, place, and time.   Skin: Skin is warm. There is erythema.   Psychiatric: He has a normal mood and affect. His behavior is normal.   Nursing note and vitals reviewed.      Meds:    Current Facility-Administered Medications:   •  ampicillin-sulbactam (UNASYN) IV  •  insulin regular **AND** Accu-Chek ACHS **AND** NOTIFY MD and PharmD **AND** glucose 4 g **AND** dextrose 50%  •  aspirin  •  glimepiride  •  metFORMIN  •  lovastatin  •  senna-docusate **AND** polyethylene glycol/lytes **AND** magnesium hydroxide **AND** bisacodyl  •  heparin  •  ondansetron  •  promethazine  •  promethazine  •  prochlorperazine  •  acetaminophen    Labs:  Recent Labs      10/08/18   0327   WBC  10.0   RBC  4.04*   HEMOGLOBIN  12.4*   HEMATOCRIT  36.7*   MCV  90.8   MCH  30.7   RDW  41.2   PLATELETCT  347   MPV  10.5    NEUTSPOLYS  67.80   LYMPHOCYTES  18.20*   MONOCYTES  7.70   EOSINOPHILS  4.00   BASOPHILS  0.70     Recent Labs      10/08/18   0327   SODIUM  137   POTASSIUM  3.8   CHLORIDE  103   CO2  25   GLUCOSE  135*   BUN  31*     Recent Labs      10/08/18   0327   CREATININE  0.87       Imaging:  No recent imaging.      Micro:  Results     Procedure Component Value Units Date/Time    CULTURE WOUND W/ GRAM STAIN [[id number]]  (Abnormal)  (Susceptibility) Collected:  09/29/18 1700    Order Status:  Completed Specimen:  Wound Updated:  10/05/18 1530     Significant Indicator POS (POS)     Source WND     Site Left Foot Abscess     Culture Result Wound -- (A)     Gram Stain Result Moderate WBCs.  Moderate Gram positive cocci.  Rare Gram negative rods.       Culture Result Wound Streptococcus agalactiae (Group B)  Heavy growth   (A)      Viridans Streptococcus  Moderate growth   (A)      Staphylococcus epidermidis  Light growth   (A)    Culture & Susceptibility     STAPHYLOCOCCUS EPIDERMIDIS     Antibiotic Sensitivity Microscan Unit Status    Ampicillin/sulbactam Sensitive <=8/4 mcg/mL Final    Method: SENSITIVITY, SHAKA    Clindamycin Sensitive <=0.5 mcg/mL Final    Method: SENSITIVITY, SHAKA    Daptomycin Sensitive <=0.5 mcg/mL Final    Method: SENSITIVITY, SHAKA    Erythromycin Resistant >4 mcg/mL Final    Method: SENSITIVITY, SHAKA    Moxifloxacin Sensitive <=0.5 mcg/mL Final    Method: SENSITIVITY, SHAKA    Oxacillin Sensitive <=0.25 mcg/mL Final    Method: SENSITIVITY, SHAKA    Penicillin Resistant 8 mcg/mL Final    Method: SENSITIVITY, SHAKA    Tetracycline Intermediate 8 mcg/mL Final    Method: SENSITIVITY, SHAKA    Trimeth/Sulfa Sensitive <=0.5/9.5 mcg/mL Final    Method: SENSITIVITY, SHAKA    Vancomycin Sensitive 2 mcg/mL Final    Method: SENSITIVITY, SHAKA                       ANAEROBIC CULTURE [[id number]]  (Abnormal) Collected:  09/29/18 1700    Order Status:  Completed Specimen:  Wound Updated:  10/05/18 1530     Significant Indicator  "POS (POS)     Source WND     Site Left Foot Abscess     Anaerobic Culture, Culture Res Growth noted after further incubation, see below for  organism identification.   (A)      Bacteroides vulgatus  Light growth   (A)    FUNGAL CULTURE [[id number]] Collected:  09/29/18 1700    Order Status:  Completed Specimen:  Wound Updated:  10/05/18 1530     Significant Indicator NEG     Source WND     Site Left Foot Abscess     Fungal Culture Culture in progress.    AFB CULTURE [[id number]] Collected:  09/29/18 1700    Order Status:  Completed Specimen:  Wound Updated:  10/05/18 1530     Significant Indicator NEG     Source WND     Site Left Foot Abscess     AFB Culture Culture in progress.  NOTE:  Swabs are not recommended for the isolation of  Mycobacteria, since they provide limited material.  They  are acceptable only if a specimen cannot be collected by  any other means.  Negative results obtained from these  specimens submitted on swabs are not reliable.       AFB Smear Results No acid fast bacilli seen.    BLOOD CULTURE [[id number]] Collected:  09/29/18 1228    Order Status:  Completed Specimen:  Blood from Peripheral Updated:  10/04/18 1500     Significant Indicator NEG     Source BLD     Site PERIPHERAL     Blood Culture No growth after 5 days of incubation.    Narrative:       Per Hospital Policy: Only change Specimen Src: to \"Line\" if  specified by physician order.    BLOOD CULTURE [[id number]] Collected:  09/29/18 1243    Order Status:  Completed Specimen:  Blood from Peripheral Updated:  10/04/18 1500     Significant Indicator NEG     Source BLD     Site PERIPHERAL     Blood Culture No growth after 5 days of incubation.    Narrative:       Per Hospital Policy: Only change Specimen Src: to \"Line\" if  specified by physician order.          Assessment:  Active Hospital Problems    Diagnosis   • *Amputated toe of left foot ([facility]) [Z89.422]   • Diabetic foot infection ([facility]) [E11.628, L08.9]   • Type 2 diabetes mellitus with " complication, without long-term current use of insulin ([facility]) [E11.8]   • Sepsis ([facility]) [A41.9]   Left foot abscess    Plan:  Left DM foot infection with abscess and new osteomyelitis  Leukocytosis resolved  Bcxs on 9/29 negative  S/p L 4th toe amputation and I&D of multiloculated abscess on 9/29 with Dr. Lopez  OR cx +GBS, Strep Viridans, MSSE & bacteroides vulgatus  Being followed by LPS-started on daily dressings  S/p I&D 10/10. Metatarsal was soft indicating osteomyelitis per the OP note. Metatarsal removed up to proximal third  Continue IV Unasyn while inpatient  Original plan for 4 weeks IV Abx but will extend to a total of 6 wks given new findings of osteomyelitis noted intraoperatively today  Estimated end date 11/10/18  Continue wound care     Uncontrolled diabetes  Hemoglobin A1c 8.3% on 9/30/18  Keep blood sugar <150 control infection and promote wound healing    Prognosis of limb salvage guarded    Dispo:  Due to lack of insurance, pt will need to complete IV antibiotics inpatient.

## 2018-10-12 LAB
GLUCOSE BLD-MCNC: 124 MG/DL (ref 65–99)
GLUCOSE BLD-MCNC: 129 MG/DL (ref 65–99)
GLUCOSE BLD-MCNC: 146 MG/DL (ref 65–99)
GLUCOSE BLD-MCNC: 149 MG/DL (ref 65–99)
GLUCOSE BLD-MCNC: 202 MG/DL (ref 65–99)

## 2018-10-12 PROCEDURE — 700111 HCHG RX REV CODE 636 W/ 250 OVERRIDE (IP): Performed by: INTERNAL MEDICINE

## 2018-10-12 PROCEDURE — 700102 HCHG RX REV CODE 250 W/ 637 OVERRIDE(OP): Performed by: NURSE PRACTITIONER

## 2018-10-12 PROCEDURE — A9270 NON-COVERED ITEM OR SERVICE: HCPCS | Performed by: INTERNAL MEDICINE

## 2018-10-12 PROCEDURE — 700102 HCHG RX REV CODE 250 W/ 637 OVERRIDE(OP): Performed by: INTERNAL MEDICINE

## 2018-10-12 PROCEDURE — 700105 HCHG RX REV CODE 258: Performed by: INTERNAL MEDICINE

## 2018-10-12 PROCEDURE — 99232 SBSQ HOSP IP/OBS MODERATE 35: CPT | Performed by: INTERNAL MEDICINE

## 2018-10-12 PROCEDURE — A9270 NON-COVERED ITEM OR SERVICE: HCPCS | Performed by: NURSE PRACTITIONER

## 2018-10-12 PROCEDURE — 700111 HCHG RX REV CODE 636 W/ 250 OVERRIDE (IP): Performed by: HOSPITALIST

## 2018-10-12 PROCEDURE — 770006 HCHG ROOM/CARE - MED/SURG/GYN SEMI*

## 2018-10-12 PROCEDURE — 82962 GLUCOSE BLOOD TEST: CPT | Mod: 91

## 2018-10-12 RX ADMIN — AMPICILLIN SODIUM AND SULBACTAM SODIUM 3 G: 2; 1 INJECTION, POWDER, FOR SOLUTION INTRAMUSCULAR; INTRAVENOUS at 01:30

## 2018-10-12 RX ADMIN — HEPARIN SODIUM 5000 UNITS: 5000 INJECTION, SOLUTION INTRAVENOUS; SUBCUTANEOUS at 21:27

## 2018-10-12 RX ADMIN — LOVASTATIN 40 MG: 20 TABLET ORAL at 06:36

## 2018-10-12 RX ADMIN — HEPARIN SODIUM 5000 UNITS: 5000 INJECTION, SOLUTION INTRAVENOUS; SUBCUTANEOUS at 15:00

## 2018-10-12 RX ADMIN — METFORMIN HYDROCHLORIDE 850 MG: 850 TABLET ORAL at 17:47

## 2018-10-12 RX ADMIN — METFORMIN HYDROCHLORIDE 850 MG: 850 TABLET ORAL at 12:11

## 2018-10-12 RX ADMIN — HEPARIN SODIUM 5000 UNITS: 5000 INJECTION, SOLUTION INTRAVENOUS; SUBCUTANEOUS at 06:37

## 2018-10-12 RX ADMIN — GLIMEPIRIDE 6 MG: 4 TABLET ORAL at 06:38

## 2018-10-12 RX ADMIN — AMPICILLIN SODIUM AND SULBACTAM SODIUM 3 G: 2; 1 INJECTION, POWDER, FOR SOLUTION INTRAMUSCULAR; INTRAVENOUS at 17:44

## 2018-10-12 RX ADMIN — INSULIN HUMAN 3 UNITS: 100 INJECTION, SOLUTION PARENTERAL at 21:26

## 2018-10-12 RX ADMIN — AMPICILLIN SODIUM AND SULBACTAM SODIUM 3 G: 2; 1 INJECTION, POWDER, FOR SOLUTION INTRAMUSCULAR; INTRAVENOUS at 12:10

## 2018-10-12 RX ADMIN — LISINOPRIL 20 MG: 20 TABLET ORAL at 06:36

## 2018-10-12 RX ADMIN — AMPICILLIN SODIUM AND SULBACTAM SODIUM 3 G: 2; 1 INJECTION, POWDER, FOR SOLUTION INTRAMUSCULAR; INTRAVENOUS at 06:36

## 2018-10-12 RX ADMIN — HYDROCHLOROTHIAZIDE 12.5 MG: 12.5 TABLET ORAL at 06:36

## 2018-10-12 RX ADMIN — AMPICILLIN SODIUM AND SULBACTAM SODIUM 3 G: 2; 1 INJECTION, POWDER, FOR SOLUTION INTRAMUSCULAR; INTRAVENOUS at 23:39

## 2018-10-12 RX ADMIN — METFORMIN HYDROCHLORIDE 850 MG: 850 TABLET ORAL at 08:19

## 2018-10-12 ASSESSMENT — ENCOUNTER SYMPTOMS
NAUSEA: 0
SENSORY CHANGE: 1
FEVER: 0
SORE THROAT: 0
CONSTIPATION: 0
VOMITING: 0
CHILLS: 0
ABDOMINAL PAIN: 0
SHORTNESS OF BREATH: 0
DIARRHEA: 0
HEADACHES: 0
COUGH: 0
MYALGIAS: 0

## 2018-10-12 ASSESSMENT — PAIN SCALES - GENERAL
PAINLEVEL_OUTOF10: 0
PAINLEVEL_OUTOF10: 0

## 2018-10-12 NOTE — PROGRESS NOTES
" LIMB PRESERVATION SERVICE  HPI: Sumit Estrada is a 62 y.o. male, with a past medical history that includes uncontrolled type 2 diabetes and HTN, admitted 9/29/2018 for Sepsis (HCC).   LPS has been consulted for post op care of his amp site of the Left 4th toe.This wound started in early September of 2018 when some furniture was dropped on his left foot. Then a few days prior to admission the pt noticed severe swelling and the left fourth toe turning black.    The pt was only using ice on the wound.  IV antibiotics were started on this admission.  Infectious diseases has been consulted.    Xray has been done  and did show gas consistent with active infection  MRI has not been done  Ortho was consulted and Dr Lopez preformed the amputation  Pt was diagnosed with type 2 diabetes 20 years ago, and is currently managing with oral meds.  Pt checks their blood sugars every other day and reports that these typically run around the high 100s.  They have not had previous diabetes education.  They do have numbness in his feet.  They usually wears work shoes. They do not check their feet routinely. They have not had previous foot ulcers or foot surgery.  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     Pt had Left Foot 4th digit amp with Dr. Lopez on   9/29/2018    On 10/10/18 wound had failed to progress and Dr Lopez did a Left Foot Forth Ray Resection as well.      ASSESSMENT: Procedure(s):  IRRIGATION & DEBRIDEMENT WITH WOUND VAC - Wound Class: Dirty or Infected with Dr. Lopez on   10/10/2018    Patient denies fevers, chills, nausea, vomiting.  Pain well controlled.   BP (!) 168/82 Comment: RN notified  Pulse 75   Temp 36.2 °C (97.2 °F)   Resp 18   Ht 1.74 m (5' 8.5\")   Wt 78.1 kg (172 lb 2.9 oz)   SpO2 97%   BMI 25.80 kg/m²     Wound Characteristics           Location:  Initial Evaluation  Date:10/12/2018   Tissue Type and %: 100% Red   Periwound: Erythema, Edema   Drainage: Scant Serosanginous "   Exposed structures Sutures   Wound Edges:   Attached 100% approximated   Odor: None   S&S of Infection:   None   Edema: Localized at Site   Sensation: Insensate                     DIABETES MANAGEMENT:  Lab Results   Component Value Date/Time    GLUCOSE 135 (H) 10/08/2018 03:27 AM      Lab Results   Component Value Date/Time    HBA1C 8.3 (H) 09/30/2018 07:46 AM      Diabetes education 10/1/18    INFECTION MANAGEMENT:  WBC   Date/Time Value Ref Range Status   10/08/2018 03:27 AM 10.0 4.8 - 10.8 K/uL Final       Microbiology:     Results     Procedure Component Value Units Date/Time    CULTURE WOUND W/ GRAM STAIN [[id number]]  (Abnormal)  (Susceptibility) Collected:  09/29/18 1700    Order Status:  Completed Specimen:  Wound Updated:  10/05/18 1530     Significant Indicator POS (POS)     Source WND     Site Left Foot Abscess     Culture Result Wound -- (A)     Gram Stain Result Moderate WBCs.  Moderate Gram positive cocci.  Rare Gram negative rods.       Culture Result Wound Streptococcus agalactiae (Group B)  Heavy growth   (A)      Viridans Streptococcus  Moderate growth   (A)      Staphylococcus epidermidis  Light growth   (A)     PLAN:    Wound care: Left Foot - 4th Digit Amp site Changed Wound Order - Wound Site improved     NURSING TO CHANGE LEFT FOOT SURGICAL SITE DRESSING EVERY 48 HOURS AND PRN FOR SATURATION OR DISLODGEMENT  Nursing to cleanse wound/periwound with Normal Saline (NS).  Pat periwound dry.  Apply skin prep/No Sting to periwound.  Let air dry for 1-2 minutes. Apply SINGLE layer adaptic, cut to size, to suture sites. Cover with Non Adhesive Foam and secure with Roll Gauze.  Then apply elastic bandage to secure dressings in place.   Please take Weekly Wound Photos. Notify wound team if wound deteriorates or fails to progress.    Nursing to change Left foot every 48 hours and PRN for Saturation or Dislodgement  Wound Care by Nursing, LPS to Follow.  Offloading: Offloading boot  when  ambulating     Antibiotics: Per ID recommendation 4 Weeks IV end 11/10/18 Unasyn OR cx +GBS, Strep Viridans, MSSE & bacteroides vulgatus     Surgery: NA     DISCHARGE PLAN:     Disposition:      Follow-up: LPS rounds in [facility] 11/02/18     Other:         Vasiliy Chow R.N.

## 2018-10-12 NOTE — PROGRESS NOTES
Infectious Disease Progress Note    Author: GUS Castro Date & Time of service: 10/12/2018  1:01 PM    Chief Complaint:  Left DM foot infection  Left foot abscess    Interval History:  62 y.o. Male with Left foot DM foot infection with abscess s/p 4th toe amputation and I&D.  10/2- Tm 99.7, no WBC, 7/10 L foot pain- improved with rest and pain meds, no issue with abx.  10/3- AF, WBC 10.6, Left foot sore- better than yesterday, tolerating abx.  10/4- AF, no WBC, sleeping- easily aroused, no issue with abx, generalized tenderness to L foot, had a HA this AM that is slowly resolving.  10/5-T-max 99, WBC 10.5, denies any pain to left foot, resting in bed sleeping-easily arousable, tolerating antibiotics.  10/6- Tm 99.5, no WBC, L  after ambulating, no issue with abx.  10/7-AF, no WBC, tolerating antibiotics, denies any left foot pain, reports ambulating around the hallways without issue.  10/8 AF WBC 10 patient complains of left foot pain especially with movement, tolerating IV antibiotics without any issues  10/9- Tm 99, no WBC, left foot slightly tender with ambulation, no issue with antibiotics.  10/10 AF no CBC no new issues to report, plan for I&D today with wound vac placement  10/11- AF, no WBC, denies any pain to L foot, no issue with abx, resting in chair without complaint.  10/12- Tm 99, no WBC, tolerating antibiotics, denies any pain.  Labs Reviewed, Medications Reviewed, Radiology Reviewed and Wound Reviewed.    Review of Systems:  Review of Systems   Constitutional: Negative for chills, fever and malaise/fatigue.   HENT: Negative for sore throat.    Respiratory: Negative for cough and shortness of breath.    Cardiovascular: Positive for leg swelling. Negative for chest pain.   Gastrointestinal: Negative for abdominal pain, constipation, diarrhea, nausea and vomiting.   Genitourinary: Negative for dysuria.   Musculoskeletal: Negative for joint pain and myalgias.   Skin: Negative  for rash.   Neurological: Positive for sensory change. Negative for headaches.       Hemodynamics:  Temp (24hrs), Av.9 °C (98.4 °F), Min:36.2 °C (97.2 °F), Max:37.2 °C (99 °F)  Temperature: 36.2 °C (97.2 °F)  Pulse  Av.9  Min: 62  Max: 121   Blood Pressure: (!) 168/82 (RN notified)       Physical Exam:  Physical Exam   Constitutional: He is oriented to person, place, and time. He appears well-developed and well-nourished. No distress.   Appears older than stated age   HENT:   Head: Normocephalic and atraumatic.   Eyes: Pupils are equal, round, and reactive to light. Conjunctivae and EOM are normal.   Neck: Normal range of motion. Neck supple. No tracheal deviation present.   Cardiovascular: Normal rate, regular rhythm and normal heart sounds.    No murmur heard.  Pulmonary/Chest: Effort normal and breath sounds normal. No respiratory distress. He has no wheezes.   Abdominal: Soft. Bowel sounds are normal. He exhibits no distension. There is no tenderness.   Musculoskeletal: He exhibits edema. He exhibits no tenderness.   Left 4th toe amp site- surgical dressing remains in place, post op shoe in place.     RUE PICC- CDI, non tender, no erythema.   Neurological: He is alert and oriented to person, place, and time.   Skin: Skin is warm and dry. No rash noted.   Psychiatric: He has a normal mood and affect. His behavior is normal.   Nursing note and vitals reviewed.      Meds:    Current Facility-Administered Medications:   •  glimepiride  •  lisinopril  •  hydroCHLOROthiazide  •  ampicillin-sulbactam (UNASYN) IV  •  insulin regular **AND** Accu-Chek ACHS **AND** NOTIFY MD and PharmD **AND** glucose 4 g **AND** dextrose 50%  •  aspirin  •  metFORMIN  •  lovastatin  •  senna-docusate **AND** polyethylene glycol/lytes **AND** magnesium hydroxide **AND** bisacodyl  •  heparin  •  ondansetron  •  promethazine  •  promethazine  •  prochlorperazine  •  acetaminophen    Labs:  No results for input(s): WBC, RBC,  HEMOGLOBIN, HEMATOCRIT, MCV, MCH, RDW, PLATELETCT, MPV, NEUTSPOLYS, LYMPHOCYTES, MONOCYTES, EOSINOPHILS, BASOPHILS, RBCMORPHOLO in the last 72 hours.  No results for input(s): SODIUM, POTASSIUM, CHLORIDE, CO2, GLUCOSE, BUN, CPKTOTAL in the last 72 hours.  No results for input(s): ALBUMIN, TBILIRUBIN, ALKPHOSPHAT, TOTPROTEIN, ALTSGPT, ASTSGOT, CREATININE in the last 72 hours.    Imaging:  No recent imaging.      Micro:  Results     Procedure Component Value Units Date/Time    CULTURE WOUND W/ GRAM STAIN [[id number]]  (Abnormal)  (Susceptibility) Collected:  09/29/18 1700    Order Status:  Completed Specimen:  Wound Updated:  10/05/18 1530     Significant Indicator POS (POS)     Source WND     Site Left Foot Abscess     Culture Result Wound -- (A)     Gram Stain Result Moderate WBCs.  Moderate Gram positive cocci.  Rare Gram negative rods.       Culture Result Wound Streptococcus agalactiae (Group B)  Heavy growth   (A)      Viridans Streptococcus  Moderate growth   (A)      Staphylococcus epidermidis  Light growth   (A)    Culture & Susceptibility     STAPHYLOCOCCUS EPIDERMIDIS     Antibiotic Sensitivity Microscan Unit Status    Ampicillin/sulbactam Sensitive <=8/4 mcg/mL Final    Method: SENSITIVITY, SHAKA    Clindamycin Sensitive <=0.5 mcg/mL Final    Method: SENSITIVITY, SHAKA    Daptomycin Sensitive <=0.5 mcg/mL Final    Method: SENSITIVITY, SHAKA    Erythromycin Resistant >4 mcg/mL Final    Method: SENSITIVITY, SHAKA    Moxifloxacin Sensitive <=0.5 mcg/mL Final    Method: SENSITIVITY, SHAKA    Oxacillin Sensitive <=0.25 mcg/mL Final    Method: SENSITIVITY, SHAKA    Penicillin Resistant 8 mcg/mL Final    Method: SENSITIVITY, SHAKA    Tetracycline Intermediate 8 mcg/mL Final    Method: SENSITIVITY, SHAKA    Trimeth/Sulfa Sensitive <=0.5/9.5 mcg/mL Final    Method: SENSITIVITY, SHAKA    Vancomycin Sensitive 2 mcg/mL Final    Method: SENSITIVITY, SHAKA                       ANAEROBIC CULTURE [[id number]]  (Abnormal) Collected:  09/29/18  1700    Order Status:  Completed Specimen:  Wound Updated:  10/05/18 1530     Significant Indicator POS (POS)     Source WND     Site Left Foot Abscess     Anaerobic Culture, Culture Res Growth noted after further incubation, see below for  organism identification.   (A)      Bacteroides vulgatus  Light growth   (A)    FUNGAL CULTURE [[id number]] Collected:  09/29/18 1700    Order Status:  Completed Specimen:  Wound Updated:  10/05/18 1530     Significant Indicator NEG     Source WND     Site Left Foot Abscess     Fungal Culture Culture in progress.    AFB CULTURE [[id number]] Collected:  09/29/18 1700    Order Status:  Completed Specimen:  Wound Updated:  10/05/18 1530     Significant Indicator NEG     Source WND     Site Left Foot Abscess     AFB Culture Culture in progress.  NOTE:  Swabs are not recommended for the isolation of  Mycobacteria, since they provide limited material.  They  are acceptable only if a specimen cannot be collected by  any other means.  Negative results obtained from these  specimens submitted on swabs are not reliable.       AFB Smear Results No acid fast bacilli seen.          Assessment:  Active Hospital Problems    Diagnosis   • *Amputated toe of left foot ([facility]) [Z89.422]   • Diabetic foot infection ([facility]) [E11.628, L08.9]   • Type 2 diabetes mellitus with complication, without long-term current use of insulin ([facility]) [E11.8]   • Sepsis ([facility]) [A41.9]   Left foot abscess    Plan:  Left DM foot infection with abscess and new osteomyelitis  Tm 99  No leukocytosis  Bcxs on 9/29 negative  S/p L 4th toe amputation and I&D of multiloculated abscess on 9/29 with Dr. Lopez  OR cx +GBS, Strep Viridans, MSSE & bacteroides vulgatus  S/p I&D 10/10 with Dr. Mendoza- metatarsal was soft indicating osteomyelitis per the OP note. Metatarsal removed up to proximal third.  Continue IV Unasyn 3g Q6h while inpatient  Will treat for 6 weeks due to osteomyelitis findings intraoperatively  Estimated end date  11/10/18  LPS following, continue wound care    Uncontrolled diabetes  Hemoglobin A1c 8.3% on 9/30/18  Keep blood sugar <150 control infection and promote wound healing      Discussed with Jean Carlos.  Will look into doing BARBARA as patient does not have any insurance.  Patient would prefer to go to [facility] as he does not feel comfortable doing home infusion.

## 2018-10-12 NOTE — PROGRESS NOTES
Awake and alert; up in chair, talking on the phone. Call bell within reach. Requested to be disconnected from TKO IV fluids.

## 2018-10-12 NOTE — PROGRESS NOTES
Sleeping in bed; easily aroused for blood sugar check and assessment. Pleasant, appropriate; call bell within reach. Denies all pain, even to left foot/toes; does not want any pain medication at this time. Denies numbness and tingling. Skin intact to coccyx and right heel. Left foot and ankle covered with C/D/I dsg. and ACE wrap, so left heel could not be visualized. Orthotic boot for left foot at bedside; patient wears it when up and ambulating. Has been ambulating independently. Left toes that remain are warm with cap refill of 2-3 seconds. Assessment otherwise benign. PICC line to right arm. RN asked again if TKO/closed system tubing could be setup, but patient politely declined. SCDs off per patient choice. 2 units of sliding scale insulin administered. Next dose of IV Unasyn due around midnight. Reports a BM earlier today.

## 2018-10-12 NOTE — PROGRESS NOTES
"Patient seen and examined    Blood pressure 127/74, pulse 71, temperature 37.2 °C (99 °F), resp. rate 16, height 1.74 m (5' 8.5\"), weight 78.1 kg (172 lb 2.9 oz), SpO2 97 %.          No acute distress  Dressing clean dry and intact  Neurovascularly intact    POD#2    Plan:  DVT Prophylaxis- TEDS/SCDs  Weight Bearing Status-WBAT through heel only  PT/OT  Antibiotics: per ID  Case Coordination          "

## 2018-10-12 NOTE — PROGRESS NOTES
[facility] Hospitalist Progress Note    Date of Service: 10/12/2018    Chief Complaint  62 y.o. male with diabetes mellitus, hypertension, hyperlipidemia, admitted 9/29/2018 with redness and swelling of the left foot after dropping some furniture on his foot 3 weeks prior to admission.  Noted to have marked leukocytosis, in setting of infected diabetic foot.  Gas was seen on the x-ray consistent with active infection.  Patient was started on antibiotics, and orthopedics was consulted and patient subsequently underwent left fourth toe amputation through MP joint, with irrigation and debridement of multiloculated dorsal foot abscess.  OR cultures grew BGS, Streptococcus viridans, MSSA and Bacteroides vulgatus.  Pathology showed acute osteomyelitis of the left fourth toe. ID was consulted, and recommended extended course of IV antibiotics with IV Unasyn until 10/27/18.  WBC has normalized. Apparently, no payor source for SNF for IV antibiotics, and so will need to complete antibiotics in the hospital. Patient underwent incision and drainage of the left foot on 10/10. ID recommended extension of IV antibiotics to 11/10/18 for 6 weeks given new findings of osteomyelitis noted intraoperatively. PT/OT recommending discharge to home with home health once completed antibiotics.     Interval Problem Update  10/12/2018 - uneventful night. VSS. Afebrile. Saturating well on RA.  BG reasonably controlled.  Also recommends WBAT through heel only.    > I have personally seen and examined the patient today.  No complaints.  Comfortable.  Denied any pain on the left foot.  No nausea, vomiting, chest pain, shortness of breath.  No diarrhea, had normal bowel movements yesterday.          Consultants/Specialty  ID  Orthopedics    Disposition  Monitor on the floors.  Needs to complete course of IV antibiotics, then home with home health.        ROS     Pertinent positives/negatives as mentioned above.     A complete review of systems was  personally done by me. All other systems were negative.           Physical Exam  Laboratory/Imaging   Hemodynamics  Temp (24hrs), Av °C (98.6 °F), Min:36.7 °C (98.1 °F), Max:37.2 °C (99 °F)   Temperature: 37.1 °C (98.8 °F)  Pulse  Av  Min: 62  Max: 121    Blood Pressure: 117/75      Respiratory      Respiration: 16, Pulse Oximetry: 94 %             Fluids  No intake or output data in the 24 hours ending 10/12/18 0735    Nutrition  Orders Placed This Encounter   Procedures   • Diet Order Diabetic ( ADA)     Standing Status:   Standing     Number of Occurrences:   1     Order Specific Question:   Diet:     Answer:   Diabetic [3]     Comments:    ADA     Order Specific Question:   Calorie modifications:     Answer:   2000 kcals [5]     Physical Exam   Constitutional: He is oriented to person, place, and time. He appears well-developed and well-nourished. No distress.   HENT:   Head: Normocephalic and atraumatic.   Mouth/Throat: Oropharynx is clear and moist. No oropharyngeal exudate.   Eyes: Pupils are equal, round, and reactive to light. EOM are normal. Right eye exhibits no discharge. Left eye exhibits no discharge. No scleral icterus.   Neck: Normal range of motion. Neck supple. No thyromegaly present.   Cardiovascular: Normal rate and regular rhythm.  Exam reveals no gallop and no friction rub.    No murmur heard.  Pulmonary/Chest: Effort normal and breath sounds normal. He has no wheezes. He has no rales. He exhibits no tenderness.   Abdominal: Soft. Bowel sounds are normal. There is no tenderness. There is no rebound and no guarding.   Musculoskeletal: He exhibits no edema or tenderness.   Left foot dressing CDI, no tenderness.  No bleeding or discharge.   Lymphadenopathy:     He has no cervical adenopathy.   Neurological: He is alert and oriented to person, place, and time. No cranial nerve deficit.   Skin: Skin is warm and dry. No rash noted. He is not diaphoretic. No erythema.   Psychiatric:  He has a normal mood and affect. His behavior is normal. Thought content normal.   Pleasant, awake and coherent, conversant   Vitals reviewed.                                Assessment/Plan     * Diabetic foot infection, with 4th left toe osteomyelitis (HCC)- (present on admission)   Assessment & Plan    -s/p fourth toe amputation left foot on 9/29/2018. S/p repeat I&D on 10/10. Blood cultures remain negative.  Wound culture Streptococcus agalactiae (Group B)  -Needs 6 weeks of IV antibiotics with IV Unasyn, last day on 11/10.  ID following. No payor source for SNF for IV antibiotics, and thus will need to complete course of IV antibiotics in the hospital.  -Continue blood glucose control, goal<150.  -LPS following.         Amputated toe of left foot (HCC)   Assessment & Plan    -continue PT/OT while in-house.   -Continue extended course of IV antibiotics per ID.         Essential hypertension- (present on admission)   Assessment & Plan    -BP noted to be elevated in the morning, but improved trend during the day. Continue home dose lisinopril, hydrochlorothiazide, and metoprolol.  May need further dose adjustments if BP remains persistently elevated. Continue to monitor blood pressure closely.        Dyslipidemia- (present on admission)   Assessment & Plan    -Continue home dose Mevacor.        Type 2 diabetes mellitus with complication, without long-term current use of insulin (HCC)- (present on admission)   Assessment & Plan    -Better BG control with increased dose of glimepiride. HbA1c 8.3.   -Continue metformin, and amaryl. Continue sliding scale insulin coverage. Accu-Check before meals and at bedtime.  Diabetic diet.          Quality-Core Measures   Reviewed items::  Labs reviewed, Medications reviewed and Radiology images reviewed  Rivera catheter::  No Rivera  DVT prophylaxis pharmacological::  Heparin  Antibiotics:  Treating active infection/contamination beyond 24 hours perioperative coverage  Assessed for  rehabilitation services:  Patient was assess for and/or received rehabilitation services during this hospitalization

## 2018-10-13 LAB
GLUCOSE BLD-MCNC: 105 MG/DL (ref 65–99)
GLUCOSE BLD-MCNC: 126 MG/DL (ref 65–99)
GLUCOSE BLD-MCNC: 133 MG/DL (ref 65–99)
GLUCOSE BLD-MCNC: 167 MG/DL (ref 65–99)

## 2018-10-13 PROCEDURE — 700111 HCHG RX REV CODE 636 W/ 250 OVERRIDE (IP): Performed by: HOSPITALIST

## 2018-10-13 PROCEDURE — A9270 NON-COVERED ITEM OR SERVICE: HCPCS | Performed by: NURSE PRACTITIONER

## 2018-10-13 PROCEDURE — A9270 NON-COVERED ITEM OR SERVICE: HCPCS | Performed by: INTERNAL MEDICINE

## 2018-10-13 PROCEDURE — 82962 GLUCOSE BLOOD TEST: CPT | Mod: 91

## 2018-10-13 PROCEDURE — 700111 HCHG RX REV CODE 636 W/ 250 OVERRIDE (IP): Performed by: INTERNAL MEDICINE

## 2018-10-13 PROCEDURE — 770006 HCHG ROOM/CARE - MED/SURG/GYN SEMI*

## 2018-10-13 PROCEDURE — 700102 HCHG RX REV CODE 250 W/ 637 OVERRIDE(OP): Performed by: NURSE PRACTITIONER

## 2018-10-13 PROCEDURE — 700102 HCHG RX REV CODE 250 W/ 637 OVERRIDE(OP): Performed by: INTERNAL MEDICINE

## 2018-10-13 PROCEDURE — 99232 SBSQ HOSP IP/OBS MODERATE 35: CPT | Performed by: INTERNAL MEDICINE

## 2018-10-13 PROCEDURE — 700105 HCHG RX REV CODE 258: Performed by: INTERNAL MEDICINE

## 2018-10-13 RX ORDER — LISINOPRIL 20 MG/1
20 TABLET ORAL ONCE
Status: COMPLETED | OUTPATIENT
Start: 2018-10-13 | End: 2018-10-13

## 2018-10-13 RX ORDER — LISINOPRIL 20 MG/1
40 TABLET ORAL
Status: DISCONTINUED | OUTPATIENT
Start: 2018-10-14 | End: 2018-10-16 | Stop reason: HOSPADM

## 2018-10-13 RX ADMIN — LOVASTATIN 40 MG: 20 TABLET ORAL at 05:59

## 2018-10-13 RX ADMIN — AMPICILLIN SODIUM AND SULBACTAM SODIUM 3 G: 2; 1 INJECTION, POWDER, FOR SOLUTION INTRAMUSCULAR; INTRAVENOUS at 11:38

## 2018-10-13 RX ADMIN — HEPARIN SODIUM 5000 UNITS: 5000 INJECTION, SOLUTION INTRAVENOUS; SUBCUTANEOUS at 13:16

## 2018-10-13 RX ADMIN — AMPICILLIN SODIUM AND SULBACTAM SODIUM 3 G: 2; 1 INJECTION, POWDER, FOR SOLUTION INTRAMUSCULAR; INTRAVENOUS at 16:57

## 2018-10-13 RX ADMIN — METFORMIN HYDROCHLORIDE 850 MG: 850 TABLET ORAL at 08:25

## 2018-10-13 RX ADMIN — LISINOPRIL 20 MG: 20 TABLET ORAL at 10:32

## 2018-10-13 RX ADMIN — INSULIN HUMAN 2 UNITS: 100 INJECTION, SOLUTION PARENTERAL at 16:58

## 2018-10-13 RX ADMIN — HEPARIN SODIUM 5000 UNITS: 5000 INJECTION, SOLUTION INTRAVENOUS; SUBCUTANEOUS at 05:58

## 2018-10-13 RX ADMIN — GLIMEPIRIDE 6 MG: 4 TABLET ORAL at 06:00

## 2018-10-13 RX ADMIN — METFORMIN HYDROCHLORIDE 850 MG: 850 TABLET ORAL at 16:57

## 2018-10-13 RX ADMIN — HEPARIN SODIUM 5000 UNITS: 5000 INJECTION, SOLUTION INTRAVENOUS; SUBCUTANEOUS at 22:10

## 2018-10-13 RX ADMIN — AMPICILLIN SODIUM AND SULBACTAM SODIUM 3 G: 2; 1 INJECTION, POWDER, FOR SOLUTION INTRAMUSCULAR; INTRAVENOUS at 23:10

## 2018-10-13 RX ADMIN — METFORMIN HYDROCHLORIDE 850 MG: 850 TABLET ORAL at 11:38

## 2018-10-13 RX ADMIN — HYDROCHLOROTHIAZIDE 12.5 MG: 12.5 TABLET ORAL at 05:59

## 2018-10-13 RX ADMIN — LISINOPRIL 20 MG: 20 TABLET ORAL at 06:00

## 2018-10-13 RX ADMIN — AMPICILLIN SODIUM AND SULBACTAM SODIUM 3 G: 2; 1 INJECTION, POWDER, FOR SOLUTION INTRAMUSCULAR; INTRAVENOUS at 05:59

## 2018-10-13 ASSESSMENT — ENCOUNTER SYMPTOMS
ABDOMINAL PAIN: 0
COUGH: 0
SHORTNESS OF BREATH: 0
FEVER: 0
VOMITING: 0
CONSTIPATION: 0
SENSORY CHANGE: 1
DIARRHEA: 0
MYALGIAS: 0
SORE THROAT: 0
CHILLS: 0
HEADACHES: 0
NAUSEA: 0

## 2018-10-13 ASSESSMENT — PAIN SCALES - GENERAL
PAINLEVEL_OUTOF10: 0
PAINLEVEL_OUTOF10: 0

## 2018-10-13 NOTE — CARE PLAN
Problem: Safety  Goal: Will remain free from falls  Outcome: PROGRESSING AS EXPECTED    Intervention: Implement fall precautions  Bed in low position, wheels locked, call light within reach, hourly rounding in place.      Problem: Pain Management  Goal: Pain level will decrease to patient's comfort goal  Outcome: PROGRESSING AS EXPECTED  Patient denies having any pain and declines pain medication.

## 2018-10-13 NOTE — PROGRESS NOTES
Infectious Disease Progress Note    Author: Renetta Beauchamp M.D. Date & Time of service: 10/13/2018  11:47 AM    Chief Complaint:  Left DM foot infection  Left foot abscess    Interval History:  62 y.o. Male with Left foot DM foot infection with abscess s/p 4th toe amputation and I&D.  10/2- Tm 99.7, no WBC, 7/10 L foot pain- improved with rest and pain meds, no issue with abx.  10/3- AF, WBC 10.6, Left foot sore- better than yesterday, tolerating abx.  10/4- AF, no WBC, sleeping- easily aroused, no issue with abx, generalized tenderness to L foot, had a HA this AM that is slowly resolving.  10/5-T-max 99, WBC 10.5, denies any pain to left foot, resting in bed sleeping-easily arousable, tolerating antibiotics.  10/6- Tm 99.5, no WBC, L  after ambulating, no issue with abx.  10/7-AF, no WBC, tolerating antibiotics, denies any left foot pain, reports ambulating around the hallways without issue.  10/8 AF WBC 10 patient complains of left foot pain especially with movement, tolerating IV antibiotics without any issues  10/9- Tm 99, no WBC, left foot slightly tender with ambulation, no issue with antibiotics.  10/10 AF no CBC no new issues to report, plan for I&D today with wound vac placement  10/11- AF, no WBC, denies any pain to L foot, no issue with abx, resting in chair without complaint.  10/12- Tm 99, no WBC, tolerating antibiotics, denies any pain.  10/13 AF CBC not done, patient resting comfortably without any issues  Labs Reviewed, Medications Reviewed, Radiology Reviewed and Wound Reviewed.    Review of Systems:  Review of Systems   Constitutional: Negative for chills, fever and malaise/fatigue.   HENT: Negative for sore throat.    Respiratory: Negative for cough and shortness of breath.    Cardiovascular: Positive for leg swelling. Negative for chest pain.   Gastrointestinal: Negative for abdominal pain, constipation, diarrhea, nausea and vomiting.   Genitourinary: Negative for dysuria.    Musculoskeletal: Negative for joint pain and myalgias.   Skin: Negative for rash.   Neurological: Positive for sensory change. Negative for headaches.       Hemodynamics:  Temp (24hrs), Av.8 °C (98.3 °F), Min:36.6 °C (97.8 °F), Max:37.1 °C (98.7 °F)  Temperature: 37.1 °C (98.7 °F)  Pulse  Av.7  Min: 62  Max: 121   Blood Pressure: 160/110       Physical Exam:  Physical Exam   Constitutional: He is oriented to person, place, and time. He appears well-developed and well-nourished. No distress.   Appears older than stated age   HENT:   Head: Normocephalic and atraumatic.   Eyes: Pupils are equal, round, and reactive to light. Conjunctivae and EOM are normal.   Neck: Normal range of motion. Neck supple. No tracheal deviation present.   Cardiovascular: Normal rate, regular rhythm and normal heart sounds.    No murmur heard.  Pulmonary/Chest: Effort normal and breath sounds normal. No respiratory distress. He has no wheezes.   Abdominal: Soft. Bowel sounds are normal. He exhibits no distension. There is no tenderness.   Musculoskeletal: He exhibits edema. He exhibits no tenderness.   Left 4th toe amp site- surgical dressing remains in place, post op shoe in place.     RUE PICC- CDI, non tender, no erythema.   Neurological: He is alert and oriented to person, place, and time.   Skin: Skin is warm and dry. No rash noted.   Psychiatric: He has a normal mood and affect. His behavior is normal.   Nursing note and vitals reviewed.      Meds:    Current Facility-Administered Medications:   •  [START ON 10/14/2018] lisinopril  •  glimepiride  •  hydroCHLOROthiazide  •  ampicillin-sulbactam (UNASYN) IV  •  insulin regular **AND** Accu-Chek ACHS **AND** NOTIFY MD and PharmD **AND** glucose 4 g **AND** dextrose 50%  •  aspirin  •  metFORMIN  •  lovastatin  •  senna-docusate **AND** polyethylene glycol/lytes **AND** magnesium hydroxide **AND** bisacodyl  •  heparin  •  ondansetron  •  promethazine  •  promethazine  •   prochlorperazine  •  acetaminophen    Labs:  No results for input(s): WBC, RBC, HEMOGLOBIN, HEMATOCRIT, MCV, MCH, RDW, PLATELETCT, MPV, NEUTSPOLYS, LYMPHOCYTES, MONOCYTES, EOSINOPHILS, BASOPHILS, RBCMORPHOLO in the last 72 hours.  No results for input(s): SODIUM, POTASSIUM, CHLORIDE, CO2, GLUCOSE, BUN, CPKTOTAL in the last 72 hours.  No results for input(s): ALBUMIN, TBILIRUBIN, ALKPHOSPHAT, TOTPROTEIN, ALTSGPT, ASTSGOT, CREATININE in the last 72 hours.    Imaging:  No recent imaging.      Micro:  Results     ** No results found for the last 168 hours. **          Assessment:  Active Hospital Problems    Diagnosis   • *Amputated toe of left foot ([facility]) [Z89.422]   • Diabetic foot infection ([facility]) [E11.628, L08.9]   • Type 2 diabetes mellitus with complication, without long-term current use of insulin ([facility]) [E11.8]   • Sepsis ([facility]) [A41.9]   Left foot abscess    Plan:  Left DM foot infection with abscess and new osteomyelitis  Tm 99  No leukocytosis  Bcxs on 9/29 negative  S/p L 4th toe amputation and I&D of multiloculated abscess on 9/29 with Dr. Lopez  OR cx +GBS, Strep Viridans, MSSE & bacteroides vulgatus  S/p I&D 10/10 with Dr. Mendoza- metatarsal was soft indicating osteomyelitis per the OP note. Metatarsal removed up to proximal third.  Continue IV Unasyn 3g Q6h while inpatient  Will treat for 6 weeks due to osteomyelitis findings intraoperatively  Estimated end date 11/10/18  LPS following, continue wound care    Uncontrolled diabetes  Hemoglobin A1c 8.3% on 9/30/18  Keep blood sugar <150 control infection and promote wound healing    CM look into doing BARBARA as patient does not have any insurance.  Patient would prefer to go to [facility] as he does not feel comfortable doing home infusion.

## 2018-10-13 NOTE — CARE PLAN
Problem: Safety  Goal: Will remain free from injury  Outcome: PROGRESSING AS EXPECTED  Bed low and locked position, hourly rounding,  socks on, call light and belongings within reach.

## 2018-10-13 NOTE — PROGRESS NOTES
"Patient seen and examined    Blood pressure 159/80, pulse 75, temperature 36.9 °C (98.5 °F), resp. rate 17, height 1.74 m (5' 8.5\"), weight 78.1 kg (172 lb 2.9 oz), SpO2 95 %.          No acute distress  Dressing clean dry and intact  Neurovascularly intact    POD#3    Plan:  DVT Prophylaxis- TEDS/SCDs  Weight Bearing Status-WBAT through heel  PT/OT  Antibiotics: per ID  Case Coordination          "

## 2018-10-13 NOTE — PROGRESS NOTES
[facility] Hospitalist Progress Note    Date of Service: 10/13/2018    Chief Complaint  62 y.o. male with diabetes mellitus, hypertension, hyperlipidemia, admitted 9/29/2018 with redness and swelling of the left foot after dropping some furniture on his foot 3 weeks prior to admission.  Noted to have marked leukocytosis, in setting of infected diabetic foot.  Gas was seen on the x-ray consistent with active infection.  Patient was started on antibiotics, and orthopedics was consulted and patient subsequently underwent left fourth toe amputation through MP joint, with irrigation and debridement of multiloculated dorsal foot abscess.  OR cultures grew BGS, Streptococcus viridans, MSSA and Bacteroides vulgatus.  Pathology showed acute osteomyelitis of the left fourth toe. ID was consulted, and recommended extended course of IV antibiotics with IV Unasyn until 10/27/18.  WBC has normalized. Apparently, no payor source for SNF for IV antibiotics, and so will need to complete antibiotics in the hospital. Patient underwent incision and drainage of the left foot on 10/10. ID recommended extension of IV antibiotics to 11/10/18 for 6 weeks given new findings of osteomyelitis noted intraoperatively. PT/OT recommending discharge to home with home health once completed antibiotics.  Orthopedics recommended weightbearing as tolerated through heel only.    Interval Problem Update  10/13/2018 - no overnight events. Remains hemodynamically stable and afebrile. BP elevated. Stable on RA.  .    > I have personally seen and examined the patient today.  Comfortable.  Denied any pain.  No nausea, vomiting, abdominal pain, diarrhea.  He is contemplating on doing outpatient infusion for his antibiotics.      Consultants/Specialty  ID  Orthopedics    Disposition  Monitor on the floors.  Needs to complete course of IV antibiotics, then home with home health.        ROS     Pertinent positives/negatives as mentioned above.     A complete  review of systems was personally done by me. All other systems were negative.        Physical Exam  Laboratory/Imaging   Hemodynamics  Temp (24hrs), Av.7 °C (98 °F), Min:36.2 °C (97.2 °F), Max:36.9 °C (98.5 °F)   Temperature: 36.9 °C (98.5 °F)  Pulse  Av.8  Min: 62  Max: 121    Blood Pressure: 159/80      Respiratory      Respiration: 17, Pulse Oximetry: 95 %        RUL Breath Sounds: Clear, RML Breath Sounds: Clear, RLL Breath Sounds: Diminished, STACY Breath Sounds: Clear, LLL Breath Sounds: Diminished    Fluids    Intake/Output Summary (Last 24 hours) at 10/13/18 0740  Last data filed at 10/12/18 1800   Gross per 24 hour   Intake              720 ml   Output                0 ml   Net              720 ml       Nutrition  Orders Placed This Encounter   Procedures   • Diet Order Diabetic ( ADA)     Standing Status:   Standing     Number of Occurrences:   1     Order Specific Question:   Diet:     Answer:   Diabetic [3]     Comments:    ADA     Order Specific Question:   Calorie modifications:     Answer:   2000 kcals [5]     Physical Exam   Constitutional: He is oriented to person, place, and time. He appears well-developed and well-nourished. No distress.   HENT:   Head: Normocephalic and atraumatic.   Mouth/Throat: Oropharynx is clear and moist. No oropharyngeal exudate.   Eyes: Pupils are equal, round, and reactive to light. EOM are normal. Right eye exhibits no discharge. Left eye exhibits no discharge. No scleral icterus.   Neck: Normal range of motion. Neck supple. No thyromegaly present.   Cardiovascular: Normal rate and regular rhythm.  Exam reveals no gallop and no friction rub.    No murmur heard.  Pulmonary/Chest: Effort normal and breath sounds normal. He has no wheezes. He has no rales. He exhibits no tenderness.   Abdominal: Soft. Bowel sounds are normal. There is no tenderness. There is no rebound and no guarding.   Musculoskeletal: He exhibits no tenderness.   Left foot wrapped,  dressing clean dry and intact.  No tenderness.  No visible bleeding, discharge.   Lymphadenopathy:     He has no cervical adenopathy.   Neurological: He is alert and oriented to person, place, and time. No cranial nerve deficit.   Skin: Skin is warm and dry. No rash noted. He is not diaphoretic. No erythema.   Psychiatric: He has a normal mood and affect. His behavior is normal. Thought content normal.   Vitals reviewed.                                  Assessment/Plan     * Diabetic foot infection, with 4th left toe osteomyelitis Streptococcus agalactiae (Group B) ([facility])- (present on admission)   Assessment & Plan    -s/p fourth toe amputation left foot on 9/29/2018. S/p repeat I&D on 10/10. Blood cultures negative.  Wound culture Streptococcus agalactiae (Group B)  -complete 6 weeks of IV antibiotics with IV Unasyn until 11/10.  ID following. No payor source for SNF for IV antibiotics, and thus will need to complete course of IV antibiotics in the hospital. Maybe able to arrange for [facility] if patient amenable.   -Continue blood glucose control, goal<150.  -LPS following.         Amputated toe of left foot ([facility])   Assessment & Plan    -continue PT/OT while in-house.         Essential hypertension- (present on admission)   Assessment & Plan    -Increase lisinopril to 40mg daily, along with home dose hydrochlorothiazide, and metoprolol.  Continue to monitor blood pressure closely as may need further dose adjustment of his antihypertensives.        Dyslipidemia- (present on admission)   Assessment & Plan    -Continue home dose Mevacor.        Type 2 diabetes mellitus with complication, without long-term current use of insulin ([facility])- (present on admission)   Assessment & Plan    -good BG control. Continue increased dose of glimepiride, and metformin. HbA1c 8.3.   -Continue sliding scale insulin coverage. Accu-Check before meals and at bedtime.  Diabetic diet.          Quality-Core Measures   Reviewed items::  Labs  reviewed, Medications reviewed and Radiology images reviewed  Rivera catheter::  No Rivera  DVT prophylaxis pharmacological::  Heparin  Antibiotics:  Treating active infection/contamination beyond 24 hours perioperative coverage  Assessed for rehabilitation services:  Patient was assess for and/or received rehabilitation services during this hospitalization

## 2018-10-13 NOTE — CARE PLAN
Problem: Pain Management  Goal: Pain level will decrease to patient's comfort goal    Intervention: Follow pain managment plan developed in collaboration with patient and Interdisciplinary Team  Patient will remain pain free.

## 2018-10-14 LAB
GLUCOSE BLD-MCNC: 127 MG/DL (ref 65–99)
GLUCOSE BLD-MCNC: 132 MG/DL (ref 65–99)
GLUCOSE BLD-MCNC: 150 MG/DL (ref 65–99)
GLUCOSE BLD-MCNC: 157 MG/DL (ref 65–99)

## 2018-10-14 PROCEDURE — 82962 GLUCOSE BLOOD TEST: CPT | Mod: 91

## 2018-10-14 PROCEDURE — A9270 NON-COVERED ITEM OR SERVICE: HCPCS | Performed by: INTERNAL MEDICINE

## 2018-10-14 PROCEDURE — 700102 HCHG RX REV CODE 250 W/ 637 OVERRIDE(OP): Performed by: NURSE PRACTITIONER

## 2018-10-14 PROCEDURE — 99232 SBSQ HOSP IP/OBS MODERATE 35: CPT | Performed by: INTERNAL MEDICINE

## 2018-10-14 PROCEDURE — 700105 HCHG RX REV CODE 258: Performed by: INTERNAL MEDICINE

## 2018-10-14 PROCEDURE — 700102 HCHG RX REV CODE 250 W/ 637 OVERRIDE(OP): Performed by: INTERNAL MEDICINE

## 2018-10-14 PROCEDURE — 700111 HCHG RX REV CODE 636 W/ 250 OVERRIDE (IP): Performed by: HOSPITALIST

## 2018-10-14 PROCEDURE — A9270 NON-COVERED ITEM OR SERVICE: HCPCS | Performed by: NURSE PRACTITIONER

## 2018-10-14 PROCEDURE — 700105 HCHG RX REV CODE 258

## 2018-10-14 PROCEDURE — 700111 HCHG RX REV CODE 636 W/ 250 OVERRIDE (IP): Performed by: INTERNAL MEDICINE

## 2018-10-14 PROCEDURE — 770006 HCHG ROOM/CARE - MED/SURG/GYN SEMI*

## 2018-10-14 RX ORDER — AMLODIPINE BESYLATE 5 MG/1
5 TABLET ORAL
Status: DISCONTINUED | OUTPATIENT
Start: 2018-10-14 | End: 2018-10-16 | Stop reason: HOSPADM

## 2018-10-14 RX ORDER — SODIUM CHLORIDE 9 MG/ML
INJECTION, SOLUTION INTRAVENOUS
Status: COMPLETED
Start: 2018-10-14 | End: 2018-10-14

## 2018-10-14 RX ADMIN — SODIUM CHLORIDE 500 ML: 9 INJECTION, SOLUTION INTRAVENOUS at 06:08

## 2018-10-14 RX ADMIN — LISINOPRIL 40 MG: 20 TABLET ORAL at 06:08

## 2018-10-14 RX ADMIN — GLIMEPIRIDE 6 MG: 4 TABLET ORAL at 06:16

## 2018-10-14 RX ADMIN — INSULIN HUMAN 2 UNITS: 100 INJECTION, SOLUTION PARENTERAL at 11:43

## 2018-10-14 RX ADMIN — HYDROCHLOROTHIAZIDE 12.5 MG: 12.5 TABLET ORAL at 06:08

## 2018-10-14 RX ADMIN — HEPARIN SODIUM 5000 UNITS: 5000 INJECTION, SOLUTION INTRAVENOUS; SUBCUTANEOUS at 14:01

## 2018-10-14 RX ADMIN — METFORMIN HYDROCHLORIDE 850 MG: 850 TABLET ORAL at 17:33

## 2018-10-14 RX ADMIN — METFORMIN HYDROCHLORIDE 850 MG: 850 TABLET ORAL at 07:42

## 2018-10-14 RX ADMIN — HEPARIN SODIUM 5000 UNITS: 5000 INJECTION, SOLUTION INTRAVENOUS; SUBCUTANEOUS at 06:08

## 2018-10-14 RX ADMIN — HEPARIN SODIUM 5000 UNITS: 5000 INJECTION, SOLUTION INTRAVENOUS; SUBCUTANEOUS at 21:52

## 2018-10-14 RX ADMIN — METFORMIN HYDROCHLORIDE 850 MG: 850 TABLET ORAL at 11:42

## 2018-10-14 RX ADMIN — AMPICILLIN SODIUM AND SULBACTAM SODIUM 3 G: 2; 1 INJECTION, POWDER, FOR SOLUTION INTRAMUSCULAR; INTRAVENOUS at 17:37

## 2018-10-14 RX ADMIN — AMPICILLIN SODIUM AND SULBACTAM SODIUM 3 G: 2; 1 INJECTION, POWDER, FOR SOLUTION INTRAMUSCULAR; INTRAVENOUS at 11:42

## 2018-10-14 RX ADMIN — AMLODIPINE BESYLATE 5 MG: 5 TABLET ORAL at 11:42

## 2018-10-14 RX ADMIN — AMPICILLIN SODIUM AND SULBACTAM SODIUM 3 G: 2; 1 INJECTION, POWDER, FOR SOLUTION INTRAMUSCULAR; INTRAVENOUS at 06:07

## 2018-10-14 RX ADMIN — LOVASTATIN 40 MG: 20 TABLET ORAL at 06:09

## 2018-10-14 ASSESSMENT — ENCOUNTER SYMPTOMS
VOMITING: 0
CONSTIPATION: 0
NAUSEA: 0
DIARRHEA: 0
CHILLS: 0
FEVER: 0
SENSORY CHANGE: 1
SHORTNESS OF BREATH: 0
HEADACHES: 0
SORE THROAT: 0
ABDOMINAL PAIN: 0
MYALGIAS: 0
COUGH: 0

## 2018-10-14 ASSESSMENT — PAIN SCALES - GENERAL
PAINLEVEL_OUTOF10: 0
PAINLEVEL_OUTOF10: 0

## 2018-10-14 NOTE — PROGRESS NOTES
Infectious Disease Progress Note    Author: Renetta Beauchamp M.D. Date & Time of service: 10/14/2018  12:05 PM    Chief Complaint:  Left DM foot infection  Left foot abscess    Interval History:  62 y.o. Male with Left foot DM foot infection with abscess s/p 4th toe amputation and I&D.  10/2- Tm 99.7, no WBC, 7/10 L foot pain- improved with rest and pain meds, no issue with abx.  10/3- AF, WBC 10.6, Left foot sore- better than yesterday, tolerating abx.  10/4- AF, no WBC, sleeping- easily aroused, no issue with abx, generalized tenderness to L foot, had a HA this AM that is slowly resolving.  10/5-T-max 99, WBC 10.5, denies any pain to left foot, resting in bed sleeping-easily arousable, tolerating antibiotics.  10/6- Tm 99.5, no WBC, L  after ambulating, no issue with abx.  10/7-AF, no WBC, tolerating antibiotics, denies any left foot pain, reports ambulating around the hallways without issue.  10/8 AF WBC 10 patient complains of left foot pain especially with movement, tolerating IV antibiotics without any issues  10/9- Tm 99, no WBC, left foot slightly tender with ambulation, no issue with antibiotics.  10/10 AF no CBC no new issues to report, plan for I&D today with wound vac placement  10/11- AF, no WBC, denies any pain to L foot, no issue with abx, resting in chair without complaint.  10/12- Tm 99, no WBC, tolerating antibiotics, denies any pain.  10/13 AF CBC not done, patient resting comfortably without any issues  10/14 AF patient without any new complaints or concerns, tolerating antibiotics, no diarrhea  Labs Reviewed, Medications Reviewed, Radiology Reviewed and Wound Reviewed.    Review of Systems:  Review of Systems   Constitutional: Negative for chills, fever and malaise/fatigue.   HENT: Negative for sore throat.    Respiratory: Negative for cough and shortness of breath.    Cardiovascular: Positive for leg swelling. Negative for chest pain.   Gastrointestinal: Negative for abdominal  pain, constipation, diarrhea, nausea and vomiting.   Genitourinary: Negative for dysuria.   Musculoskeletal: Negative for joint pain and myalgias.   Skin: Negative for rash.   Neurological: Positive for sensory change. Negative for headaches.       Hemodynamics:  Temp (24hrs), Av.7 °C (98.1 °F), Min:36.1 °C (97 °F), Max:37.1 °C (98.8 °F)  Temperature: 36.8 °C (98.3 °F)  Pulse  Av.7  Min: 62  Max: 121   Blood Pressure: 158/93       Physical Exam:  Physical Exam   Constitutional: He is oriented to person, place, and time. He appears well-developed and well-nourished. No distress.   Appears older than stated age   HENT:   Head: Normocephalic and atraumatic.   Eyes: Pupils are equal, round, and reactive to light. Conjunctivae and EOM are normal.   Neck: Normal range of motion. Neck supple. No tracheal deviation present.   Cardiovascular: Normal rate, regular rhythm and normal heart sounds.    No murmur heard.  Pulmonary/Chest: Effort normal and breath sounds normal. No respiratory distress. He has no wheezes.   Abdominal: Soft. Bowel sounds are normal. He exhibits no distension. There is no tenderness.   Musculoskeletal: He exhibits edema. He exhibits no tenderness.   Left foot dressed.  Toes warm and wiggles  RUE PICC- CDI, non tender, no erythema.   Neurological: He is alert and oriented to person, place, and time.   Skin: Skin is warm and dry. No rash noted.   Psychiatric: He has a normal mood and affect. His behavior is normal.   Nursing note and vitals reviewed.      Meds:    Current Facility-Administered Medications:   •  amLODIPine  •  lisinopril  •  glimepiride  •  hydroCHLOROthiazide  •  ampicillin-sulbactam (UNASYN) IV  •  insulin regular **AND** Accu-Chek ACHS **AND** NOTIFY MD and PharmD **AND** glucose 4 g **AND** dextrose 50%  •  aspirin  •  metFORMIN  •  lovastatin  •  senna-docusate **AND** polyethylene glycol/lytes **AND** magnesium hydroxide **AND** bisacodyl  •  heparin  •  ondansetron  •   promethazine  •  promethazine  •  prochlorperazine  •  acetaminophen    Labs:  No results for input(s): WBC, RBC, HEMOGLOBIN, HEMATOCRIT, MCV, MCH, RDW, PLATELETCT, MPV, NEUTSPOLYS, LYMPHOCYTES, MONOCYTES, EOSINOPHILS, BASOPHILS, RBCMORPHOLO in the last 72 hours.  No results for input(s): SODIUM, POTASSIUM, CHLORIDE, CO2, GLUCOSE, BUN, CPKTOTAL in the last 72 hours.  No results for input(s): ALBUMIN, TBILIRUBIN, ALKPHOSPHAT, TOTPROTEIN, ALTSGPT, ASTSGOT, CREATININE in the last 72 hours.    Imaging:  No recent imaging.      Micro:  Results     ** No results found for the last 168 hours. **          Assessment:  Active Hospital Problems    Diagnosis   • *Amputated toe of left foot ([facility]) [Z89.422]   • Diabetic foot infection ([facility]) [E11.628, L08.9]   • Type 2 diabetes mellitus with complication, without long-term current use of insulin ([facility]) [E11.8]   • Sepsis ([facility]) [A41.9]   Left foot abscess    Plan:  Left DM foot infection with abscess and new osteomyelitis  No leukocytosis  Bcxs on 9/29 negative  S/p L 4th toe amputation and I&D of multiloculated abscess on 9/29 with Dr. Lopez  OR cx +GBS, Strep Viridans, MSSE & bacteroides vulgatus  S/p I&D 10/10 with Dr. Mendoza- metatarsal was soft indicating osteomyelitis per the OP note. Metatarsal removed up to proximal third.  Continue IV Unasyn 3g Q6h while inpatient  Will treat for 6 weeks due to osteomyelitis findings intraoperatively  Estimated end date 11/10/18  LPS following, continue wound care    Uncontrolled diabetes  Hemoglobin A1c 8.3% on 9/30/18  Keep blood sugar <150 control infection and promote wound healing    CM look into doing [facility] as patient does not have any insurance.  Patient would prefer to go to [facility] as he does not feel comfortable doing home infusion.

## 2018-10-14 NOTE — PROGRESS NOTES
[facility] Hospitalist Progress Note    Date of Service: 10/14/2018    Chief Complaint  62 y.o. male with diabetes mellitus, hypertension, hyperlipidemia, admitted 9/29/2018 with redness and swelling of the left foot after dropping some furniture on his foot 3 weeks prior to admission.  Noted to have marked leukocytosis, in setting of infected diabetic foot.  Gas was seen on the x-ray consistent with active infection.  Patient was started on antibiotics, and orthopedics was consulted and patient subsequently underwent left fourth toe amputation through MP joint, with irrigation and debridement of multiloculated dorsal foot abscess.  OR cultures grew BGS, Streptococcus viridans, MSSA and Bacteroides vulgatus.  Pathology showed acute osteomyelitis of the left fourth toe. ID was consulted, and recommended extended course of IV antibiotics with IV Unasyn until 10/27/18.  WBC has normalized. Apparently, no payor source for SNF for IV antibiotics, and so will need to complete antibiotics in the hospital. Patient underwent incision and drainage of the left foot on 10/10. ID recommended extension of IV antibiotics to 11/10/18 for 6 weeks given new findings of osteomyelitis noted intraoperatively. PT/OT recommending discharge to home with home health once completed antibiotics.  Orthopedics recommended weightbearing as tolerated through heel only.    Interval Problem Update  10/14/2018 - uneventful night. VSS. Afebrile. Saturating well on RA.  .     > I have personally seen and examined the patient today.  No complaints.  No nausea, vomiting, chest pain, shortness of breath.  Denied any pain on the foot.  Moving his bowels okay.        Consultants/Specialty  ID  Orthopedics    Disposition  Monitor on the floors.  Needs to complete course of IV antibiotics, then home with home health.  ?[facility] for [facility].      ROS     Pertinent positives/negatives as mentioned above.     A complete review of systems was personally done by me.  All other systems were negative.        Physical Exam  Laboratory/Imaging   Hemodynamics  Temp (24hrs), Av.8 °C (98.2 °F), Min:36.1 °C (97 °F), Max:37.1 °C (98.8 °F)   Temperature: 37 °C (98.6 °F)  Pulse  Av.6  Min: 62  Max: 121    Blood Pressure: 155/92      Respiratory      Respiration: 16, Pulse Oximetry: 95 %        RUL Breath Sounds: Clear, RML Breath Sounds: Clear, RLL Breath Sounds: Diminished, STACY Breath Sounds: Clear, LLL Breath Sounds: Diminished    Fluids    Intake/Output Summary (Last 24 hours) at 10/14/18 0733  Last data filed at 10/14/18 0607   Gross per 24 hour   Intake                0 ml   Output                3 ml   Net               -3 ml       Nutrition  Orders Placed This Encounter   Procedures   • Diet Order Diabetic ( ADA)     Standing Status:   Standing     Number of Occurrences:   1     Order Specific Question:   Diet:     Answer:   Diabetic [3]     Comments:    ADA     Order Specific Question:   Calorie modifications:     Answer:   2000 kcals [5]     Physical Exam   Constitutional: He is oriented to person, place, and time. He appears well-developed and well-nourished. No distress.   HENT:   Head: Normocephalic and atraumatic.   Mouth/Throat: Oropharynx is clear and moist. No oropharyngeal exudate.   Eyes: Pupils are equal, round, and reactive to light. EOM are normal. Right eye exhibits no discharge. Left eye exhibits no discharge. No scleral icterus.   Neck: Normal range of motion. Neck supple. No thyromegaly present.   Cardiovascular: Exam reveals no gallop and no friction rub.    No murmur heard.  Pulmonary/Chest: Effort normal and breath sounds normal. He has no wheezes. He has no rales. He exhibits no tenderness.   Abdominal: Soft. Bowel sounds are normal. There is no tenderness. There is no rebound and no guarding.   Musculoskeletal: He exhibits no tenderness.   Left foot dressing CDI.  No toe cyanosis.  No swelling.  No tenderness.   Lymphadenopathy:     He has no  cervical adenopathy.   Neurological: He is alert and oriented to person, place, and time. No cranial nerve deficit.   Skin: Skin is warm and dry. No rash noted. He is not diaphoretic. No erythema.   Psychiatric: He has a normal mood and affect. His behavior is normal. Thought content normal.   Vitals reviewed.                                  Assessment/Plan     * Diabetic foot infection, with 4th left toe osteomyelitis Streptococcus agalactiae (Group B) ([facility])- (present on admission)   Assessment & Plan    -s/p fourth toe amputation left foot on 9/29/2018. S/p repeat I&D on 10/10. Blood cultures negative.  Wound culture Streptococcus agalactiae (Group B)  -completing 6 weeks of IV antibiotics with IV Unasyn until 11/10.  ID following. No payor source for SNF for IV antibiotics, Complete course of IV antibiotics in the hospital. ?[facility] for [facility]. CM/SW on board.   -Continue blood glucose control, goal<150.  -LPS following.         Amputated toe of left foot ([facility])   Assessment & Plan    -continue PT/OT while in-house.         Essential hypertension- (present on admission)   Assessment & Plan    -BP remains on the high side.  Continue increased dose of lisinopril 40 mg daily, along with home dose hydrochlorothiazide, and metoprolol.  Add Norvasc 5 mg daily. Continue to monitor blood pressure closely as may need further dose adjustment of his antihypertensives.        Dyslipidemia- (present on admission)   Assessment & Plan    -Continue home dose Mevacor.        Type 2 diabetes mellitus with complication, without long-term current use of insulin ([facility])- (present on admission)   Assessment & Plan    -acceptable BG control. Continue increased dose of glimepiride, and metformin. HbA1c 8.3.   -Continue sliding scale insulin coverage. Accu-Check before meals and at bedtime.  Diabetic diet.          Quality-Core Measures   Reviewed items::  Labs reviewed, Medications reviewed and Radiology images reviewed  Rivera catheter::  No  Rivera  DVT prophylaxis pharmacological::  Heparin  Antibiotics:  Treating active infection/contamination beyond 24 hours perioperative coverage  Assessed for rehabilitation services:  Patient was assess for and/or received rehabilitation services during this hospitalization

## 2018-10-14 NOTE — PROGRESS NOTES
Continues on scheduled IV ancef regimen via right arm PICC line. Ambulating and performing own ADLs independently; wears special shoe to left foot when up. C/D/I dsg. and ACE wrap to left foot. Verbalizes that he is WBAT, but that he is to put his weight on his left heel. Trace edema to right ankle; left ankle and most of left foot covered by dsg. and cannot be visualized. Pedal pulse palpable to left foot; left toes warm. Wiggles remaining toes on left foot on command. Denies numbness, tingling, and all pain to left foot.

## 2018-10-15 LAB
GLUCOSE BLD-MCNC: 116 MG/DL (ref 65–99)
GLUCOSE BLD-MCNC: 157 MG/DL (ref 65–99)
GLUCOSE BLD-MCNC: 161 MG/DL (ref 65–99)
GLUCOSE BLD-MCNC: 163 MG/DL (ref 65–99)

## 2018-10-15 PROCEDURE — A9270 NON-COVERED ITEM OR SERVICE: HCPCS | Performed by: NURSE PRACTITIONER

## 2018-10-15 PROCEDURE — A9270 NON-COVERED ITEM OR SERVICE: HCPCS | Performed by: INTERNAL MEDICINE

## 2018-10-15 PROCEDURE — 770006 HCHG ROOM/CARE - MED/SURG/GYN SEMI*

## 2018-10-15 PROCEDURE — 82962 GLUCOSE BLOOD TEST: CPT | Mod: 91

## 2018-10-15 PROCEDURE — 700111 HCHG RX REV CODE 636 W/ 250 OVERRIDE (IP): Performed by: HOSPITALIST

## 2018-10-15 PROCEDURE — 99232 SBSQ HOSP IP/OBS MODERATE 35: CPT | Performed by: INTERNAL MEDICINE

## 2018-10-15 PROCEDURE — 700102 HCHG RX REV CODE 250 W/ 637 OVERRIDE(OP): Performed by: INTERNAL MEDICINE

## 2018-10-15 PROCEDURE — 700102 HCHG RX REV CODE 250 W/ 637 OVERRIDE(OP): Performed by: HOSPITALIST

## 2018-10-15 PROCEDURE — 700105 HCHG RX REV CODE 258: Performed by: INTERNAL MEDICINE

## 2018-10-15 PROCEDURE — A9270 NON-COVERED ITEM OR SERVICE: HCPCS | Performed by: HOSPITALIST

## 2018-10-15 PROCEDURE — 700102 HCHG RX REV CODE 250 W/ 637 OVERRIDE(OP): Performed by: NURSE PRACTITIONER

## 2018-10-15 PROCEDURE — 700111 HCHG RX REV CODE 636 W/ 250 OVERRIDE (IP): Performed by: INTERNAL MEDICINE

## 2018-10-15 RX ADMIN — METFORMIN HYDROCHLORIDE 850 MG: 850 TABLET ORAL at 17:27

## 2018-10-15 RX ADMIN — INSULIN HUMAN 2 UNITS: 100 INJECTION, SOLUTION PARENTERAL at 17:27

## 2018-10-15 RX ADMIN — METFORMIN HYDROCHLORIDE 850 MG: 850 TABLET ORAL at 05:38

## 2018-10-15 RX ADMIN — LISINOPRIL 40 MG: 20 TABLET ORAL at 05:39

## 2018-10-15 RX ADMIN — LOVASTATIN 40 MG: 20 TABLET ORAL at 05:39

## 2018-10-15 RX ADMIN — HEPARIN SODIUM 5000 UNITS: 5000 INJECTION, SOLUTION INTRAVENOUS; SUBCUTANEOUS at 14:38

## 2018-10-15 RX ADMIN — HEPARIN SODIUM 5000 UNITS: 5000 INJECTION, SOLUTION INTRAVENOUS; SUBCUTANEOUS at 05:39

## 2018-10-15 RX ADMIN — AMPICILLIN SODIUM AND SULBACTAM SODIUM 3 G: 2; 1 INJECTION, POWDER, FOR SOLUTION INTRAMUSCULAR; INTRAVENOUS at 05:38

## 2018-10-15 RX ADMIN — INSULIN HUMAN 2 UNITS: 100 INJECTION, SOLUTION PARENTERAL at 05:39

## 2018-10-15 RX ADMIN — GLIMEPIRIDE 6 MG: 4 TABLET ORAL at 05:39

## 2018-10-15 RX ADMIN — HEPARIN SODIUM 5000 UNITS: 5000 INJECTION, SOLUTION INTRAVENOUS; SUBCUTANEOUS at 20:27

## 2018-10-15 RX ADMIN — AMPICILLIN SODIUM AND SULBACTAM SODIUM 3 G: 2; 1 INJECTION, POWDER, FOR SOLUTION INTRAMUSCULAR; INTRAVENOUS at 00:22

## 2018-10-15 RX ADMIN — INSULIN HUMAN 2 UNITS: 100 INJECTION, SOLUTION PARENTERAL at 20:27

## 2018-10-15 RX ADMIN — AMPICILLIN SODIUM AND SULBACTAM SODIUM 3 G: 2; 1 INJECTION, POWDER, FOR SOLUTION INTRAMUSCULAR; INTRAVENOUS at 12:13

## 2018-10-15 RX ADMIN — AMLODIPINE BESYLATE 5 MG: 5 TABLET ORAL at 05:43

## 2018-10-15 RX ADMIN — HYDROCHLOROTHIAZIDE 12.5 MG: 12.5 TABLET ORAL at 05:38

## 2018-10-15 RX ADMIN — AMPICILLIN SODIUM AND SULBACTAM SODIUM 3 G: 2; 1 INJECTION, POWDER, FOR SOLUTION INTRAMUSCULAR; INTRAVENOUS at 22:09

## 2018-10-15 RX ADMIN — METFORMIN HYDROCHLORIDE 850 MG: 850 TABLET ORAL at 12:13

## 2018-10-15 RX ADMIN — AMPICILLIN SODIUM AND SULBACTAM SODIUM 3 G: 2; 1 INJECTION, POWDER, FOR SOLUTION INTRAMUSCULAR; INTRAVENOUS at 17:27

## 2018-10-15 ASSESSMENT — ENCOUNTER SYMPTOMS
VOMITING: 0
ABDOMINAL PAIN: 0
SENSORY CHANGE: 1
FEVER: 0
NAUSEA: 0
WEAKNESS: 0
DIARRHEA: 0
CHILLS: 0
HEADACHES: 0
PALPITATIONS: 0
SHORTNESS OF BREATH: 0

## 2018-10-15 ASSESSMENT — PAIN SCALES - GENERAL
PAINLEVEL_OUTOF10: 0

## 2018-10-15 NOTE — PROGRESS NOTES
Infectious Disease Progress Note    Author: GUS Castro Date & Time of service: 10/15/2018  12:16 PM    Chief Complaint:  Left DM foot infection  Left foot abscess    Interval History:  62 y.o. Male with Left foot DM foot infection with abscess s/p 4th toe amputation and I&D.  10/2- Tm 99.7, no WBC, 7/10 L foot pain- improved with rest and pain meds, no issue with abx.  10/3- AF, WBC 10.6, Left foot sore- better than yesterday, tolerating abx.  10/4- AF, no WBC, sleeping- easily aroused, no issue with abx, generalized tenderness to L foot, had a HA this AM that is slowly resolving.  10/5-T-max 99, WBC 10.5, denies any pain to left foot, resting in bed sleeping-easily arousable, tolerating antibiotics.  10/6- Tm 99.5, no WBC, L  after ambulating, no issue with abx.  10/7-AF, no WBC, tolerating antibiotics, denies any left foot pain, reports ambulating around the hallways without issue.  10/8 AF WBC 10 patient complains of left foot pain especially with movement, tolerating IV antibiotics without any issues  10/9- Tm 99, no WBC, left foot slightly tender with ambulation, no issue with antibiotics.  10/10 AF no CBC no new issues to report, plan for I&D today with wound vac placement  10/11- AF, no WBC, denies any pain to L foot, no issue with abx, resting in chair without complaint.  10/12- Tm 99, no WBC, tolerating antibiotics, denies any pain.  10/13 AF CBC not done, patient resting comfortably without any issues  10/14 AF patient without any new complaints or concerns, tolerating antibiotics, no diarrhea  10/15- AF, no WBC, denies any left foot pain, tolerating antibiotics without issue, agreeable to going to infusion center if BARBARA completed.   Labs Reviewed, Medications Reviewed, Radiology Reviewed and Wound Reviewed.    Review of Systems:  Review of Systems   Constitutional: Negative for chills and fever.   HENT: Negative for congestion.    Respiratory: Negative for shortness of breath.     Cardiovascular: Positive for leg swelling. Negative for chest pain and palpitations.   Gastrointestinal: Negative for abdominal pain, diarrhea, nausea and vomiting.   Genitourinary: Negative for dysuria.   Musculoskeletal: Negative for joint pain.   Skin: Negative for itching.   Neurological: Positive for sensory change. Negative for weakness and headaches.       Hemodynamics:  Temp (24hrs), Av.7 °C (98.1 °F), Min:36.7 °C (98 °F), Max:36.7 °C (98.1 °F)  Temperature: 36.7 °C (98 °F)  Pulse  Av.5  Min: 62  Max: 121   Blood Pressure: 131/83       Physical Exam:  Physical Exam   Constitutional: He is oriented to person, place, and time. He appears well-developed. No distress.   Appears older than stated age   HENT:   Head: Normocephalic and atraumatic.   Eyes: Pupils are equal, round, and reactive to light. Conjunctivae and EOM are normal. No scleral icterus.   Neck: Normal range of motion. Neck supple. No JVD present.   Cardiovascular: Normal rate, regular rhythm, normal heart sounds and intact distal pulses.  Exam reveals no friction rub.    No murmur heard.  Pulmonary/Chest: Effort normal and breath sounds normal. No respiratory distress. He has no rales.   Abdominal: Soft. Bowel sounds are normal. He exhibits no distension. There is no rebound and no guarding.   Musculoskeletal: He exhibits edema. He exhibits no tenderness.   Left fourth toe amp site- sutures in place, moderate serosanguineous drainage, no odor, mild maceration to distal portion of incision, mild erythema to surrounding tissue-improving, nontender to palpation.    RUE PICC- CDI, non tender, no erythema.   Neurological: He is alert and oriented to person, place, and time.   Skin: Skin is warm and dry. He is not diaphoretic. There is erythema.   Psychiatric: He has a normal mood and affect. Thought content normal.   Nursing note and vitals reviewed.      Meds:    Current Facility-Administered Medications:   •  amLODIPine  •  lisinopril  •   glimepiride  •  hydroCHLOROthiazide  •  ampicillin-sulbactam (UNASYN) IV  •  insulin regular **AND** Accu-Chek ACHS **AND** NOTIFY MD and PharmD **AND** glucose 4 g **AND** dextrose 50%  •  aspirin  •  metFORMIN  •  lovastatin  •  senna-docusate **AND** polyethylene glycol/lytes **AND** magnesium hydroxide **AND** bisacodyl  •  heparin  •  ondansetron  •  promethazine  •  promethazine  •  prochlorperazine  •  acetaminophen    Labs:  No results for input(s): WBC, RBC, HEMOGLOBIN, HEMATOCRIT, MCV, MCH, RDW, PLATELETCT, MPV, NEUTSPOLYS, LYMPHOCYTES, MONOCYTES, EOSINOPHILS, BASOPHILS, RBCMORPHOLO in the last 72 hours.  No results for input(s): SODIUM, POTASSIUM, CHLORIDE, CO2, GLUCOSE, BUN, CPKTOTAL in the last 72 hours.  No results for input(s): ALBUMIN, TBILIRUBIN, ALKPHOSPHAT, TOTPROTEIN, ALTSGPT, ASTSGOT, CREATININE in the last 72 hours.    Imaging:  No recent imaging.      Micro:  Results     ** No results found for the last 168 hours. **          Assessment:  Active Hospital Problems    Diagnosis   • *Amputated toe of left foot (formerly [facility]) [Z89.422]   • Diabetic foot infection (formerly [facility]) [E11.628, L08.9]   • Type 2 diabetes mellitus with complication, without long-term current use of insulin (formerly [facility]) [E11.8]   • Sepsis (formerly [facility]) [A41.9]   Left foot abscess    Plan:  Left DM foot infection with abscess and new osteomyelitis  Afebrile   No leukocytosis on last check  CBC, CMP and ESR ordered for tomorrow  Bcxs on 9/29 negative  S/p L 4th toe amputation and I&D of multiloculated abscess on 9/29 with Dr. Lopez  OR cx +GBS, Strep Viridans, MSSE & bacteroides vulgatus  S/p I&D 10/10 with Dr. Mendoza- metatarsal was soft indicating osteomyelitis per the OP note. Metatarsal removed up to proximal third.  Continue IV Unasyn 3g Q6h while inpatient  Will treat for 6 weeks due to osteomyelitis findings intraoperatively  Estimated end date 11/10/18  LPS following, continue wound care    Uncontrolled diabetes  Hemoglobin A1c 8.3% on  9/30/18  Keep blood sugar <150 control infection and promote wound healing      Discussed with MARZENA Dinh and [place].  Looking into getting BARBARA for patient to go to infusion center.  Could do IV ertapenem daily at [facility].

## 2018-10-15 NOTE — PROGRESS NOTES
Afebrile; vitals WNL. Continues on IV Unasyn via PICC line. Denies all pain. Wearing specialized shoe to left foot when OOB and ambulating; ambulates frequently. Q48H dsg. to left foot changed on day shift of 10/14/18.

## 2018-10-15 NOTE — PROGRESS NOTES
[facility] Hospitalist Progress Note    Date of Service: 10/15/2018    Chief Complaint  62 y.o. male with diabetes mellitus, hypertension, hyperlipidemia, admitted 9/29/2018 with redness and swelling of the left foot after dropping some furniture on his foot 3 weeks prior to admission.  Noted to have marked leukocytosis, in setting of infected diabetic foot.  Gas was seen on the x-ray consistent with active infection.  Patient was started on antibiotics, and orthopedics was consulted and patient subsequently underwent left fourth toe amputation through MP joint, with irrigation and debridement of multiloculated dorsal foot abscess.  OR cultures grew BGS, Streptococcus viridans, MSSA and Bacteroides vulgatus.  Pathology showed acute osteomyelitis of the left fourth toe. ID was consulted, and recommended extended course of IV antibiotics with IV Unasyn until 10/27/18.  WBC has normalized. Apparently, no payor source for SNF for IV antibiotics, and so will need to complete antibiotics in the hospital. Patient underwent incision and drainage of the left foot on 10/10. ID recommended extension of IV antibiotics to 11/10/18 for 6 weeks given new findings of osteomyelitis noted intraoperatively. PT/OT recommending discharge to home with home health once completed antibiotics.  Orthopedics recommended weightbearing as tolerated through heel only.    Interval Problem Update  10/15/2018 - no overnight events. Remains hemodynamically stable and afebrile. Stable on RA. . Completing IV antibiotics until 11/10. CM/SW looking into BARBARA for [facility].     > I have personally seen and examined the patient today.  No complaints.  No pain at all on the left foot, states he is able to walk on it without any issues or pain.  No nausea, vomiting, chest pain, shortness of breath.  Having regular bowel movements.        Consultants/Specialty  ID  Orthopedics    Disposition  Monitor on the floors.  Needs to complete course of IV antibiotics,  then home with home health.  ?BARBARA for [facility].      ROS     Pertinent positives/negatives as mentioned above.     A complete review of systems was personally done by me. All other systems were negative.      Physical Exam  Laboratory/Imaging   Hemodynamics  Temp (24hrs), Av.8 °C (98.2 °F), Min:36.7 °C (98.1 °F), Max:36.8 °C (98.3 °F)   Temperature: 36.7 °C (98.1 °F)  Pulse  Av.6  Min: 62  Max: 121    Blood Pressure: 139/79      Respiratory      Respiration: 18, Pulse Oximetry: 99 %        RUL Breath Sounds: Clear, RML Breath Sounds: Clear, RLL Breath Sounds: Diminished, STACY Breath Sounds: Clear, LLL Breath Sounds: Diminished    Fluids    Intake/Output Summary (Last 24 hours) at 10/15/18 0726  Last data filed at 10/14/18 1700   Gross per 24 hour   Intake                0 ml   Output                3 ml   Net               -3 ml       Nutrition  Orders Placed This Encounter   Procedures   • Diet Order Diabetic ( ADA)     Standing Status:   Standing     Number of Occurrences:   1     Order Specific Question:   Diet:     Answer:   Diabetic [3]     Comments:    ADA     Order Specific Question:   Calorie modifications:     Answer:   2000 kcals [5]     Physical Exam   Constitutional: He is oriented to person, place, and time. He appears well-developed and well-nourished. No distress.   HENT:   Head: Normocephalic and atraumatic.   Mouth/Throat: Oropharynx is clear and moist. No oropharyngeal exudate.   Eyes: Pupils are equal, round, and reactive to light. EOM are normal. Right eye exhibits no discharge. Left eye exhibits no discharge. No scleral icterus.   Neck: Normal range of motion. Neck supple. No thyromegaly present.   Cardiovascular: Normal rate and regular rhythm.  Exam reveals no gallop and no friction rub.    No murmur heard.  Pulmonary/Chest: Effort normal and breath sounds normal. He has no wheezes. He has no rales. He exhibits no tenderness.   Abdominal: Soft. Bowel sounds are normal. There is no  tenderness. There is no rebound and no guarding.   Musculoskeletal: Normal range of motion. He exhibits no edema or tenderness.   Left foot dressing clean dry and intact.  No tenderness or swelling.   Lymphadenopathy:     He has no cervical adenopathy.   Neurological: He is alert and oriented to person, place, and time. No cranial nerve deficit.   Skin: Skin is warm and dry. No rash noted. He is not diaphoretic. No erythema.   Psychiatric: He has a normal mood and affect. His behavior is normal. Thought content normal.   Vitals reviewed.                                Assessment/Plan     * Diabetic foot infection, with 4th left toe osteomyelitis Streptococcus agalactiae (Group B) ([facility])- (present on admission)   Assessment & Plan    -s/p fourth toe amputation left foot on 9/29/2018. S/p repeat I&D on 10/10. Blood cultures negative.  Wound culture Streptococcus agalactiae (Group B)  -needs 6 weeks of IV antibiotics. Continue IV Unasyn until 11/10. No payor source for SNF for IV antibiotics, complete course of IV antibiotics in the hospital vs BARBARA for [facility].   -Continue blood glucose control, goal<150.  -LPS following.         Amputated toe of left foot ([facility])   Assessment & Plan    -continue PT/OT while in-house.         Essential hypertension- (present on admission)   Assessment & Plan    -better BP control. Continue increased dose of lisinopril 40 mg daily, new norvasc, and home dose hydrochlorothiazide, and metoprolol.  Continue to monitor blood pressure closely as may need further dose adjustment of his antihypertensives.        Dyslipidemia- (present on admission)   Assessment & Plan    -Continue home dose Mevacor.        Type 2 diabetes mellitus with complication, without long-term current use of insulin ([facility])- (present on admission)   Assessment & Plan    -fair BG control. Continue increased dose of glimepiride, and metformin. HbA1c 8.3.   -Continue sliding scale insulin coverage. Accu-Check before meals and at  bedtime.  Diabetic diet.          Quality-Core Measures   Reviewed items::  Labs reviewed, Medications reviewed and Radiology images reviewed  Rivera catheter::  No Rivera  DVT prophylaxis pharmacological::  Heparin  Antibiotics:  Treating active infection/contamination beyond 24 hours perioperative coverage  Assessed for rehabilitation services:  Patient was assess for and/or received rehabilitation services during this hospitalization

## 2018-10-15 NOTE — DIETARY
Nutrition Services: consult received to discuss options while on diabetic diet to increase amount of food allowed     Visited pt at bedside. Went over the difference between protein and carbohydrate. Encouraged pt to order double protein portions to get more food - pt verbalized understanding. Pt also requesting resources for diabetic diet after d/c - provided [facility] diabetes booklet and went over nutrition portion. Pt had no further nutrition questions.     Also noted order for supplements per RD added to pt's diet orders. Will add 1 Boost Glucose Control supplement per day to increase protein intake. Will aid in wound healing as well as help preserve lean body mass.     RD available prn

## 2018-10-15 NOTE — PROGRESS NOTES
" LIMB PRESERVATION SERVICE  HPI: Sumit Estrada is a 62 y.o. male, with a past medical history that includes uncontrolled type 2 diabetes and HTN, admitted 9/29/2018 for Sepsis (HCC).   LPS has been consulted for post op care of his amp site of the Left 4th toe.This wound started in early September of 2018 when some furniture was dropped on his left foot. Then a few days prior to admission the pt noticed severe swelling and the left fourth toe turning black.    The pt was only using ice on the wound.  IV antibiotics were started on this admission.  Infectious diseases has been consulted.    Xray has been done  and did show gas consistent with active infection  MRI has not been done  Ortho was consulted and Dr Lopez preformed the amputation  Pt was diagnosed with type 2 diabetes 20 years ago, and is currently managing with oral meds.  Pt checks their blood sugars every other day and reports that these typically run around the high 100s.  They have not had previous diabetes education.  They do have numbness in his feet.  They usually wears work shoes. They do not check their feet routinely. They have not had previous foot ulcers or foot surgery.  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     Pt had Left Foot 4th digit amp with Dr. Lopez on   9/29/2018     On 10/10/18 wound had failed to progress and Dr Lopez did a Left Foot Forth Ray Resection as well.    ASSESSMENT: Procedure(s):  IRRIGATION & DEBRIDEMENT WITH WOUND VAC - Wound Class: Dirty or Infected with Dr. Lopez on   10/10/2018   Left Foot 5th Ray Amp      Patient denies fevers, chills, nausea, vomiting.  Pain well controlled.   /83   Pulse 76   Temp 36.7 °C (98 °F)   Resp 16   Ht 1.74 m (5' 8.5\")   Wt 78.1 kg (172 lb 2.9 oz)   SpO2 98%   BMI 25.80 kg/m²      Wound Characteristics             Location: Left Foot - 5th Ray Amp Initial Evaluation  Date:10/12/2018 10/15/2018     Tissue Type and %: 100% Red 100% Red   Periwound: " Erythema, Edema Erythema, Edema, Macerated   Drainage: Scant Serosanginous Small Serosanginous   Exposed structures Sutures Sutures   Wound Edges:   Attached 100% approximated Attached 100% approximated   Odor: None None   S&S of Infection:   None None   Edema: Localized at Site Localized at Site   Sensation: Insensate insensate             DIABETES MANAGEMENT:  Lab Results   Component Value Date/Time    GLUCOSE 135 (H) 10/08/2018 03:27 AM      Lab Results   Component Value Date/Time    HBA1C 8.3 (H) 09/30/2018 07:46 AM        Diabetes education seen 10/1/18    INFECTION MANAGEMENT:  WBC   Date/Time Value Ref Range Status   10/08/2018 03:27 AM 10.0 4.8 - 10.8 K/uL Final       Microbiology:   Results     ** No results found for the last 168 hours. **             PLAN:   Wound care: Left Foot - 4th Digit Amp site Changed Wound Order - Wound Site increased drainage -      NURSING TO CHANGE LEFT FOOT SURGICAL SITE DRESSING EVERY 24 HOURS AND PRN FOR SATURATION OR DISLODGEMENT  Nursing to cleanse wound/periwound with Normal Saline (NS).  Apply a piece of AqAg (Hydrofiber Silver), cut to size, to the suture sites. Cover with Non Adhesive Foam and secure with Roll Gauze.  Then apply elastic bandage to secure dressings in place.   Please take Weekly Wound Photos. Notify wound team if wound deteriorates or fails to progress.     Nursing to change Left foot every DAY and PRN for Saturation or Dislodgement  Wound Care by Nursing, LPS to Follow.  Offloading: Offloading boot  when ambulating     Antibiotics: Per ID recommendation 4 Weeks IV end 11/10/18 Unasyn OR cx +GBS, Strep Viridans, MSSE & bacteroides vulgatus     Surgery: NA     DISCHARGE PLAN:     Disposition:      Follow-up: LPS rounds in Wound Clinic 11/02/18     Other:  D/W ID and bedside RN       Vasiliy Chow R.N.

## 2018-10-16 ENCOUNTER — PATIENT OUTREACH (OUTPATIENT)
Dept: HEALTH INFORMATION MANAGEMENT | Facility: OTHER | Age: 63
End: 2018-10-16

## 2018-10-16 VITALS
WEIGHT: 172.18 LBS | OXYGEN SATURATION: 96 % | TEMPERATURE: 97.7 F | SYSTOLIC BLOOD PRESSURE: 152 MMHG | BODY MASS INDEX: 25.5 KG/M2 | HEIGHT: 69 IN | DIASTOLIC BLOOD PRESSURE: 79 MMHG | RESPIRATION RATE: 16 BRPM | HEART RATE: 90 BPM

## 2018-10-16 LAB
ALBUMIN SERPL BCP-MCNC: 3.9 G/DL (ref 3.2–4.9)
ALBUMIN/GLOB SERPL: 1.1 G/DL
ALP SERPL-CCNC: 74 U/L (ref 30–99)
ALT SERPL-CCNC: 24 U/L (ref 2–50)
ANION GAP SERPL CALC-SCNC: 8 MMOL/L (ref 0–11.9)
AST SERPL-CCNC: 23 U/L (ref 12–45)
BASOPHILS # BLD AUTO: 0.6 % (ref 0–1.8)
BASOPHILS # BLD: 0.06 K/UL (ref 0–0.12)
BILIRUB SERPL-MCNC: 0.5 MG/DL (ref 0.1–1.5)
BUN SERPL-MCNC: 37 MG/DL (ref 8–22)
CALCIUM SERPL-MCNC: 9.3 MG/DL (ref 8.5–10.5)
CHLORIDE SERPL-SCNC: 103 MMOL/L (ref 96–112)
CO2 SERPL-SCNC: 26 MMOL/L (ref 20–33)
CREAT SERPL-MCNC: 0.97 MG/DL (ref 0.5–1.4)
EOSINOPHIL # BLD AUTO: 0.26 K/UL (ref 0–0.51)
EOSINOPHIL NFR BLD: 2.8 % (ref 0–6.9)
ERYTHROCYTE [DISTWIDTH] IN BLOOD BY AUTOMATED COUNT: 45.1 FL (ref 35.9–50)
ERYTHROCYTE [SEDIMENTATION RATE] IN BLOOD BY WESTERGREN METHOD: 27 MM/HOUR (ref 0–20)
GLOBULIN SER CALC-MCNC: 3.6 G/DL (ref 1.9–3.5)
GLUCOSE BLD-MCNC: 102 MG/DL (ref 65–99)
GLUCOSE BLD-MCNC: 120 MG/DL (ref 65–99)
GLUCOSE SERPL-MCNC: 130 MG/DL (ref 65–99)
HCT VFR BLD AUTO: 42 % (ref 42–52)
HGB BLD-MCNC: 14.8 G/DL (ref 14–18)
IMM GRANULOCYTES # BLD AUTO: 0.09 K/UL (ref 0–0.11)
IMM GRANULOCYTES NFR BLD AUTO: 1 % (ref 0–0.9)
LYMPHOCYTES # BLD AUTO: 1.91 K/UL (ref 1–4.8)
LYMPHOCYTES NFR BLD: 20.4 % (ref 22–41)
MCH RBC QN AUTO: 32 PG (ref 27–33)
MCHC RBC AUTO-ENTMCNC: 35.2 G/DL (ref 33.7–35.3)
MCV RBC AUTO: 90.7 FL (ref 81.4–97.8)
MONOCYTES # BLD AUTO: 0.6 K/UL (ref 0–0.85)
MONOCYTES NFR BLD AUTO: 6.4 % (ref 0–13.4)
NEUTROPHILS # BLD AUTO: 6.42 K/UL (ref 1.82–7.42)
NEUTROPHILS NFR BLD: 68.8 % (ref 44–72)
NRBC # BLD AUTO: 0 K/UL
NRBC BLD-RTO: 0 /100 WBC
PLATELET # BLD AUTO: 310 K/UL (ref 164–446)
PMV BLD AUTO: 10.6 FL (ref 9–12.9)
POTASSIUM SERPL-SCNC: 4 MMOL/L (ref 3.6–5.5)
PROT SERPL-MCNC: 7.5 G/DL (ref 6–8.2)
RBC # BLD AUTO: 4.63 M/UL (ref 4.7–6.1)
SODIUM SERPL-SCNC: 137 MMOL/L (ref 135–145)
WBC # BLD AUTO: 9.3 K/UL (ref 4.8–10.8)

## 2018-10-16 PROCEDURE — 80053 COMPREHEN METABOLIC PANEL: CPT

## 2018-10-16 PROCEDURE — 82962 GLUCOSE BLOOD TEST: CPT

## 2018-10-16 PROCEDURE — 85025 COMPLETE CBC W/AUTO DIFF WBC: CPT

## 2018-10-16 PROCEDURE — 700102 HCHG RX REV CODE 250 W/ 637 OVERRIDE(OP): Performed by: NURSE PRACTITIONER

## 2018-10-16 PROCEDURE — 99239 HOSP IP/OBS DSCHRG MGMT >30: CPT | Performed by: INTERNAL MEDICINE

## 2018-10-16 PROCEDURE — 700111 HCHG RX REV CODE 636 W/ 250 OVERRIDE (IP): Performed by: HOSPITALIST

## 2018-10-16 PROCEDURE — 99232 SBSQ HOSP IP/OBS MODERATE 35: CPT | Performed by: INTERNAL MEDICINE

## 2018-10-16 PROCEDURE — A9270 NON-COVERED ITEM OR SERVICE: HCPCS | Performed by: INTERNAL MEDICINE

## 2018-10-16 PROCEDURE — 700105 HCHG RX REV CODE 258: Performed by: INTERNAL MEDICINE

## 2018-10-16 PROCEDURE — 700102 HCHG RX REV CODE 250 W/ 637 OVERRIDE(OP): Performed by: INTERNAL MEDICINE

## 2018-10-16 PROCEDURE — A9270 NON-COVERED ITEM OR SERVICE: HCPCS | Performed by: NURSE PRACTITIONER

## 2018-10-16 PROCEDURE — 85652 RBC SED RATE AUTOMATED: CPT

## 2018-10-16 PROCEDURE — 700111 HCHG RX REV CODE 636 W/ 250 OVERRIDE (IP): Performed by: INTERNAL MEDICINE

## 2018-10-16 PROCEDURE — 700105 HCHG RX REV CODE 258

## 2018-10-16 RX ORDER — 0.9 % SODIUM CHLORIDE 0.9 %
10-20 VIAL (ML) INJECTION PRN
Status: CANCELLED | OUTPATIENT
Start: 2018-10-16

## 2018-10-16 RX ORDER — 0.9 % SODIUM CHLORIDE 0.9 %
5 VIAL (ML) INJECTION PRN
Status: CANCELLED | OUTPATIENT
Start: 2018-10-16

## 2018-10-16 RX ORDER — SODIUM CHLORIDE 9 MG/ML
INJECTION, SOLUTION INTRAVENOUS
Status: DISCONTINUED
Start: 2018-10-16 | End: 2018-10-16

## 2018-10-16 RX ORDER — AMLODIPINE BESYLATE 5 MG/1
5 TABLET ORAL DAILY
Qty: 30 TAB | Refills: 0 | Status: SHIPPED | OUTPATIENT
Start: 2018-10-17

## 2018-10-16 RX ORDER — SODIUM CHLORIDE 9 MG/ML
INJECTION, SOLUTION INTRAVENOUS
Status: COMPLETED
Start: 2018-10-16 | End: 2018-10-16

## 2018-10-16 RX ADMIN — HEPARIN SODIUM 5000 UNITS: 5000 INJECTION, SOLUTION INTRAVENOUS; SUBCUTANEOUS at 14:00

## 2018-10-16 RX ADMIN — METFORMIN HYDROCHLORIDE 850 MG: 850 TABLET ORAL at 05:30

## 2018-10-16 RX ADMIN — LOVASTATIN 40 MG: 20 TABLET ORAL at 05:30

## 2018-10-16 RX ADMIN — HYDROCHLOROTHIAZIDE 12.5 MG: 12.5 TABLET ORAL at 05:30

## 2018-10-16 RX ADMIN — AMPICILLIN SODIUM AND SULBACTAM SODIUM 3 G: 2; 1 INJECTION, POWDER, FOR SOLUTION INTRAMUSCULAR; INTRAVENOUS at 05:29

## 2018-10-16 RX ADMIN — GLIMEPIRIDE 6 MG: 4 TABLET ORAL at 05:31

## 2018-10-16 RX ADMIN — SODIUM CHLORIDE 1000 MG: 900 INJECTION INTRAVENOUS at 15:23

## 2018-10-16 RX ADMIN — LISINOPRIL 40 MG: 20 TABLET ORAL at 05:30

## 2018-10-16 RX ADMIN — AMPICILLIN SODIUM AND SULBACTAM SODIUM 3 G: 2; 1 INJECTION, POWDER, FOR SOLUTION INTRAMUSCULAR; INTRAVENOUS at 11:54

## 2018-10-16 RX ADMIN — AMLODIPINE BESYLATE 5 MG: 5 TABLET ORAL at 05:30

## 2018-10-16 RX ADMIN — HEPARIN SODIUM 5000 UNITS: 5000 INJECTION, SOLUTION INTRAVENOUS; SUBCUTANEOUS at 05:30

## 2018-10-16 RX ADMIN — METFORMIN HYDROCHLORIDE 850 MG: 850 TABLET ORAL at 11:09

## 2018-10-16 RX ADMIN — SODIUM CHLORIDE 500 ML: 9 INJECTION, SOLUTION INTRAVENOUS at 05:29

## 2018-10-16 ASSESSMENT — ENCOUNTER SYMPTOMS
VOMITING: 0
SORE THROAT: 0
COUGH: 0
DIARRHEA: 0
CHILLS: 0
MYALGIAS: 0
SENSORY CHANGE: 1
FEVER: 0
SHORTNESS OF BREATH: 0
CONSTIPATION: 0
HEADACHES: 0
NAUSEA: 0
ABDOMINAL PAIN: 0

## 2018-10-16 ASSESSMENT — PAIN SCALES - GENERAL: PAINLEVEL_OUTOF10: 0

## 2018-10-16 NOTE — DISCHARGE INSTRUCTIONS
Discharge InstructionsNursing to cleanse wound/periwound with Normal Saline (NS).  Apply a piece of AqAg (Hydrofiber Silver), cut to size, to the suture sites. Cover with Non Adhesive Foam and secure with Roll Gauze.  Then apply elastic bandage to secure dressings in place.   Please take Weekly Wound Photos. Notify wound team if wound deteriorates or fails to progress    Discharged to home by car with relative. Discharged via wheelchair, hospital escort: Yes.  Special equipment needed: Not Applicable    Be sure to schedule a follow-up appointment with your primary care doctor or any specialists as instructed.     Discharge Plan:   * Follow up with Dr. Lopez in 2 weeks  *follow up at the Wound Clinic on November 2nd,2018 at 2 pm  * complete your wound care as instructed  Below every 24 hours. If you have any questions please call the wound clinic      Pneumococcal Vaccine Administered/Refused: Not given - Patient refused pneumococcal vaccine  Influenza Vaccine Indication: Patient Refuses    I understand that a diet low in cholesterol, fat, and sodium is recommended for good health. Unless I have been given specific instructions below for another diet, I accept this instruction as my diet prescription.   Other diet: Consistent Carbohydrates    Special Instructions: Discharge instructions for the Orthopedic Patient    Follow up with Primary Care Physician within 2 weeks of discharge to home, regarding:  Review of medications and diagnostic testing.  Surveillance for medical complications.  Workup and treatment of osteoporosis, if appropriate.     -Is this a Joint Replacement patient? No    -Is this patient being discharged with medication to prevent blood clots?  No    · Is patient discharged on Warfarin / Coumadin?   No     Depression / Suicide Risk    As you are discharged from this [facility] facility, it is important to learn how to keep safe from harming yourself.    Recognize the warning signs:  · Abrupt  changes in personality, positive or negative- including increase in energy   · Giving away possessions  · Change in eating patterns- significant weight changes-  positive or negative  · Change in sleeping patterns- unable to sleep or sleeping all the time   · Unwillingness or inability to communicate  · Depression  · Unusual sadness, discouragement and loneliness  · Talk of wanting to die  · Neglect of personal appearance   · Rebelliousness- reckless behavior  · Withdrawal from people/activities they love  · Confusion- inability to concentrate     If you or a loved one observes any of these behaviors or has concerns about self-harm, here's what you can do:  · Talk about it- your feelings and reasons for harming yourself  · Remove any means that you might use to hurt yourself (examples: pills, rope, extension cords, firearm)  · Get professional help from the community (Mental Health, Substance Abuse, psychological counseling)  · Do not be alone:Call your Safe Contact- someone whom you trust who will be there for you.  · Call your local CRISIS HOTLINE 916-1265 or 797-971-0813  · Call your local Children's Mobile Crisis Response Team [place] (029) 272-2784 or www[employer] acquired by [employer]  · Call the toll free National Suicide Prevention Hotlines   · National Suicide Prevention Lifeline 603-765-SACY (5997)  National Hope Line Network 800-SUICIDE (467-6024)   Discharge Instructions ONCE    Complete   Comments: PICC Line Instructions     How to Care for your PICC   Do not take a bath, swim, or use hot tubs when you have a PICC. Cover PICC line with clear plastic wrap and tape to keep it dry while showering.   Check the PICC insertion site daily for leakage, redness, swelling, or pain.   Flush the PICC as directed by your health care provider. Let your health care provider know right away if the PICC is difficult to flush or does not flush. Do not use force to flush the PICC.   Do not use a syringe that is less than 10 mL to  "flush the PICC.   Avoid blood pressure checks on the arm with the PICC.   Do not take the PICC out yourself. Only a trained clinical professional should remove the PICC.   Make sure you or anyone who access your PICC Line washes their hands before using the line.   Make sure the hub of the line is \"scrubbed\" prior to using the line.     Dressing Changes   Change the PICC dressing as instructed by your health care provider.   Change your PICC dressing if it becomes loose or wet.     When to seek medical attention   PICC is accidentally pulled all the way out.   There is any type of drainage, redness, or swelling where the PICC enters the skin.   Noticeable increase in arm circumference due to swelling of arm.   You cannot flush the PICC, it is difficult to flush, or the PICC leaks around the insertion site when it is flushed.   You hear a \"flushing\" sound when the PICC is flushed.   You notice a hole or tear in the PICC.   You develop chills or a fever.        ·       "

## 2018-10-16 NOTE — PROGRESS NOTES
Per OLIVA Mendoza, patient is cleared per ortho stand point to discharge.    Per SAKINA Cristobal at [facility] patient can do wound care himself at home and follow up in wound clinic on 11/02/2018 at 08:00.

## 2018-10-16 NOTE — CARE PLAN
Problem: Venous Thromboembolism (VTW)/Deep Vein Thrombosis (DVT) Prevention:  Goal: Patient will participate in Venous Thrombosis (VTE)/Deep Vein Thrombosis (DVT)Prevention Measures  Outcome: PROGRESSING AS EXPECTED  Pt has heparin order and is walking frequently around the unit    Problem: Discharge Barriers/Planning  Goal: Patient's continuum of care needs will be met  Outcome: PROGRESSING SLOWER THAN EXPECTED  Attempting to do a BARBARA of outpatient infusion. Pt does not feel comfortable doing his infusion at home

## 2018-10-16 NOTE — PROGRESS NOTES
"Assumed care of patient at 0725. Patient sitting at edge of bed. Wants IV line disconnected from picc. Patient upset this am, has \"needed to go to the bathroom for over an hour. And no one has come in to disconnect me\". This RN apologized for the delay and disconnected patient from PICC. Am labs drawn from PICC line this am. Patient requested for dressing change to be done later today in the afternoon.  Patient having breakfast sitting at the edge of the bed now.  "

## 2018-10-16 NOTE — PROGRESS NOTES
Received bedside shift report from night RN. Pt AOx4. Pt denies pain at this time. Does call appropriately. Bed alarm is off. Pt is up self and steady. Pt has 1 bag of clothing with them in the room, all personal items are label with pt sticker. PICC assessed and is patent. Pt is on RA. POC discussed as well as unit routine, comfort, and safety. Dicussed DC planning to home with out pt infusion. Discussed the goal for today: BARBARA for outpatient infusion, ABX treatment until 11/10, dressing change and shower. Reviewed orders, notes, labs, and test results. Hourly rounding in place with RN rounding on odd hours and CNA on even hours. 12 hour chart check completed.

## 2018-10-16 NOTE — PROGRESS NOTES
Infectious Disease Progress Note    Author: GUS Castro Date & Time of service: 10/16/2018  10:53 AM    Chief Complaint:  Left DM foot infection  Left foot abscess    Interval History:  62 y.o. Male with Left foot DM foot infection with abscess s/p 4th toe amputation and I&D.  10/2- Tm 99.7, no WBC, 7/10 L foot pain- improved with rest and pain meds, no issue with abx.  10/3- AF, WBC 10.6, Left foot sore- better than yesterday, tolerating abx.  10/4- AF, no WBC, sleeping- easily aroused, no issue with abx, generalized tenderness to L foot, had a HA this AM that is slowly resolving.  10/5-T-max 99, WBC 10.5, denies any pain to left foot, resting in bed sleeping-easily arousable, tolerating antibiotics.  10/6- Tm 99.5, no WBC, L  after ambulating, no issue with abx.  10/7-AF, no WBC, tolerating antibiotics, denies any left foot pain, reports ambulating around the hallways without issue.  10/8 AF WBC 10 patient complains of left foot pain especially with movement, tolerating IV antibiotics without any issues  10/9- Tm 99, no WBC, left foot slightly tender with ambulation, no issue with antibiotics.  10/10 AF no CBC no new issues to report, plan for I&D today with wound vac placement  10/11- AF, no WBC, denies any pain to L foot, no issue with abx, resting in chair without complaint.  10/12- Tm 99, no WBC, tolerating antibiotics, denies any pain.  10/13 AF CBC not done, patient resting comfortably without any issues  10/14 AF patient without any new complaints or concerns, tolerating antibiotics, no diarrhea  10/15- AF, no WBC, denies any left foot pain, tolerating antibiotics without issue, agreeable to going to infusion center if BARBARA completed.   10/16- AF, WBC 9.3, no issue with abx, denies any pain, resting without complaint.   Labs Reviewed, Medications Reviewed, Radiology Reviewed and Wound Reviewed.    Review of Systems:  Review of Systems   Constitutional: Negative for chills, fever  and malaise/fatigue.   HENT: Negative for sore throat.    Respiratory: Negative for cough and shortness of breath.    Cardiovascular: Positive for leg swelling. Negative for chest pain.   Gastrointestinal: Negative for abdominal pain, constipation, diarrhea, nausea and vomiting.   Genitourinary: Negative for dysuria.   Musculoskeletal: Negative for joint pain and myalgias.   Skin: Negative for rash.   Neurological: Positive for sensory change. Negative for headaches.       Hemodynamics:  Temp (24hrs), Av.6 °C (97.9 °F), Min:36.4 °C (97.6 °F), Max:36.8 °C (98.2 °F)  Temperature: 36.5 °C (97.7 °F)  Pulse  Av.7  Min: 62  Max: 121   Blood Pressure: 152/79       Physical Exam:  Physical Exam   Constitutional: He is oriented to person, place, and time. He appears well-developed and well-nourished. No distress.   Appears older than stated age   HENT:   Head: Normocephalic and atraumatic.   Eyes: Pupils are equal, round, and reactive to light. Conjunctivae and EOM are normal.   Neck: Normal range of motion. Neck supple. No tracheal deviation present.   Cardiovascular: Normal rate, regular rhythm, normal heart sounds and intact distal pulses.    No murmur heard.  Pulmonary/Chest: Effort normal and breath sounds normal. No respiratory distress. He has no wheezes.   Abdominal: Soft. Bowel sounds are normal. He exhibits no distension. There is no tenderness.   Musculoskeletal: He exhibits edema. He exhibits no tenderness.   Left fourth toe amp site- dressing in place, toes warm and able to wiggle.     RUE PICC- CDI, non tender, no erythema.   Neurological: He is alert and oriented to person, place, and time.   Skin: Skin is warm. No rash noted.   Psychiatric: He has a normal mood and affect. His behavior is normal.   Nursing note and vitals reviewed.      Meds:    Current Facility-Administered Medications:   •  NS  •  amLODIPine  •  lisinopril  •  glimepiride  •  hydroCHLOROthiazide  •  ampicillin-sulbactam (UNASYN)  IV  •  insulin regular **AND** Accu-Chek ACHS **AND** NOTIFY MD and PharmD **AND** glucose 4 g **AND** dextrose 50%  •  aspirin  •  metFORMIN  •  lovastatin  •  senna-docusate **AND** polyethylene glycol/lytes **AND** magnesium hydroxide **AND** bisacodyl  •  heparin  •  ondansetron  •  promethazine  •  promethazine  •  prochlorperazine  •  acetaminophen    Labs:  Recent Labs      10/16/18   0748   WBC  9.3   RBC  4.63*   HEMOGLOBIN  14.8   HEMATOCRIT  42.0   MCV  90.7   MCH  32.0   RDW  45.1   PLATELETCT  310   MPV  10.6   NEUTSPOLYS  68.80   LYMPHOCYTES  20.40*   MONOCYTES  6.40   EOSINOPHILS  2.80   BASOPHILS  0.60     Recent Labs      10/16/18   0748   SODIUM  137   POTASSIUM  4.0   CHLORIDE  103   CO2  26   GLUCOSE  130*   BUN  37*     Recent Labs      10/16/18   0748   ALBUMIN  3.9   TBILIRUBIN  0.5   ALKPHOSPHAT  74   TOTPROTEIN  7.5   ALTSGPT  24   ASTSGOT  23   CREATININE  0.97       Imaging:  No recent imaging.      Micro:  Results     ** No results found for the last 168 hours. **          Assessment:  Active Hospital Problems    Diagnosis   • *Amputated toe of left foot ([facility]) [Z89.422]   • Diabetic foot infection ([facility]) [E11.628, L08.9]   • Type 2 diabetes mellitus with complication, without long-term current use of insulin ([facility]) [E11.8]   • Sepsis ([facility]) [A41.9]   Left foot abscess    Plan:  Left DM foot infection with abscess and new osteomyelitis  Afebrile   No leukocytosis  ESR 27  Bcxs on 9/29 negative  S/p L 4th toe amputation and I&D of multiloculated abscess on 9/29 with Dr. Lopez  OR cx +GBS, Strep Viridans, MSSE & bacteroides vulgatus  S/p I&D 10/10 with Dr. Mendoza- metatarsal was soft indicating osteomyelitis per the OP note. Metatarsal removed up to proximal third.  Continue IV Unasyn 3g Q6h while inpatient  Weekly CBC and CMP while on IV abx  Will treat for 6 weeks due to osteomyelitis findings intraoperatively  Estimated end date 11/10/18  LPS following, continue wound  care      Uncontrolled diabetes  Hemoglobin A1c 8.3% on 9/30/18  Keep blood sugar <150 control infection and promote wound healing      Discussed with [place].  May be able to get BARBARA to [facility] if emergency Medicaid is denied.    [facility] Therapy Plan orders for IV Ertapenem 1 g daily through 11/10/18 done on 10/16, SW will need to schedule with [facility] if BARBARA/insurance approved.       ID will sign off, please re-consult if needed.  FU in ID clinic in 1-2 weeks if goes to [facility].

## 2018-10-16 NOTE — DISCHARGE PLANNING
Anticipated Discharge Disposition: [facility]    Action: LSW called [facility] and scheduled pt for Wednesday 10/17/18 at 1400 for pt's IV infusion. Pt will receive PICC care at [facility]. RN notified.  Barriers to Discharge: None    Plan: pending m/c.

## 2018-10-16 NOTE — PROGRESS NOTES
" LIMB PRESERVATION SERVICE      HPI:   62 y.o. male, with a past medical history that includes uncontrolled type 2 diabetes and HTN, admitted 9/29/2018 for Sepsis (HCC).   LPS  consulted for post op care of his amp site of the Left 4th toe.This wound started in early September of 2018 when some furniture was dropped on his left foot. Then a few days prior to admission the pt noticed severe swelling and the left fourth toe turning black.      SURGERY DATE: 9/29/18  PROCEDURE: Procedure(s): Dr. Lopez  L 4th toe amp, I&D of multiloculated dorsal foot abscess.    SURGERY DATE: 10/10/18  PROCEDURE: Procedure(s): Dr. Lopez  Left fourth ray resection      Patient denies fevers, chills, nausea, vomiting.  Pain well controlled.     /79   Pulse 90   Temp 36.5 °C (97.7 °F)   Resp 16   Ht 1.74 m (5' 8.5\")   Wt 78.1 kg (172 lb 2.9 oz)   SpO2 96%   BMI 25.80 kg/m²     SURGICAL SITE ASSESSMENT:    Incision to dorsal left foot- sutures intact, slightly open proximally.  Erythema- light erythema radiating < 1 cm around wound  Edema- local   Drainage- moderate ss.   Pedal Pulses palpable   Foot warm  Maceration noted under 5th toe (toe overlaps onto surgical site)    DIABETES MANAGEMENT:  Blood glucose: 102  A1c: 8.3 (date9/30/18)  Diabetes education completed    INFECTION MANAGEMENT:  WBC: Results for ILENE NOGUEIRA (MRN [id number]) as of 10/16/2018 14:26   Ref. Range 10/16/2018 07:48   WBC Latest Ref Range: 4.8 - 10.8 K/uL 9.3     Wound culture results:   Streptococcus agalactiae (Group B)   Heavy growth     Culture Result Wound  (Abnormal)      Viridans Streptococcus   Moderate growth     Culture Result Wound  (Abnormal)      Staphylococcus epidermidis   Light growth         Antibiotics: Per ID recommendation    MOBILITY  Weight Bearing Status: Heel weight bearing  PT Consult: Yes  Offloading: Offloading shoe    PLAN:    Wound care: Incisional dressing - Change POD #2 (Directions: Adaptic over incision, " Gauze, Roll Gauze, Ace Wrap)    Offloading: Offloading shoe    Antibiotics: Per ID recommendation    PT Consult:    Weight Bearing Status:     Plan to return to O.R.: No    DISCHARGE PLAN:    Disposition: Home.  [facility] for IV abx       Follow-up: LPS rounds in Wound Clinic. 11/2/18.  Sutures to be removed approximately 3 weeks post-op    Other:  Patient and his SO taught dressing change.  Instructed to change dressing along incision 3x/week. Supplies provoded      Sis Raya, A.P.N.    If any questions or concerns, please call [phone]

## 2018-10-16 NOTE — CARE PLAN
Problem: Communication  Goal: The ability to communicate needs accurately and effectively will improve  Outcome: PROGRESSING AS EXPECTED  Can verbally communicate needs with nursing staff. Call light with in reach    Problem: Safety  Goal: Will remain free from injury  Outcome: PROGRESSING AS EXPECTED  Patient uses boot appropriately. Calls for assistance as needed and is able to ambulate independently

## 2018-10-16 NOTE — DISCHARGE SUMMARY
Discharge Summary    CHIEF COMPLAINT ON ADMISSION  Chief Complaint   Patient presents with   • Wound Check       Reason for Admission  Wound check     Admission Date  9/29/2018    CODE STATUS  Full Code    HPI & HOSPITAL COURSE  This is a 62 y.o. male here with Wound checks.  Patient with underlying history of diabetes mellitus type 2 and hypertension.  Patient developed a left foot wound, after having an injury from furniture 3 weeks prior to presentation. Patient was evaluated by orthopedics team, taken to the operating room by Dr. Lopez on September 29, 2018 with a left fourth toe amputation through MP joint was performed with irrigation and debridement of multiloculated dorsal foot abscess.  Patient subsequently had a left fourth ray resection with with Dr. Lopez on October 10, 2018.  Patient was evaluated by infectious disease team during the hospital stay.  OR cultures grew multiple organisms, maintained on IV Unasyn during the hospital stay per ID team.  Recommendations for discharge on daily ertapenem per ID team.  Patient to remain on IV antibiotics until November 10, 2018 per ID team.  Outpatient infusion center ertapenem infusions has been arranged by the social workers.  PICC line has been placed during the hospital stay to facilitate outpatient infusion.  Patient has been cleared from ID, orthopedics and limb preservation team for discharge at this time.  Patient is eager to discharge home.  One-time dose of ertapenem administered prior to discharge, to receive next dose on October 17, 2018 at 2 PM at the infusion center per case management.  Discussed with the patient.  Limb preservation service, met with the patient prior to discharge.  They have provided the patient with supplies, advised outpatient follow-up with orthopedics team.  Patient eager to discharge home at this point in time, discharge planning discussed in detail with the patient.  Hospital schedulers informed to arrange  outpatient follow-up with primary care physician.  He is discharged home in stable condition.  He will need close outpatient follow-up with his PCP for ongoing management of his diabetes, hypertension and other risk factors.       Therefore, he is discharged in good and stable condition to home with close outpatient follow-up.    The patient met 2-midnight criteria for an inpatient stay at the time of discharge.    Discharge Date  10/16/18    FOLLOW UP ITEMS POST DISCHARGE  F/U orthopedics  F/U OPIC  F/U Orthopedics       DISCHARGE DIAGNOSES  Principal Problem:    Diabetic foot infection, with 4th left toe osteomyelitis Streptococcus agalactiae (Group B) (HCC) POA: Yes  Active Problems:    Amputated toe of left foot (HCC) POA: No    Type 2 diabetes mellitus with complication, without long-term current use of insulin ([facility]) (Chronic) POA: Yes    Dyslipidemia (Chronic) POA: Yes    Essential hypertension (Chronic) POA: Yes  Resolved Problems:    LEONEL (acute kidney injury) ([facility]) POA: Yes    Sepsis ([facility]) POA: Yes      FOLLOW UP  Future Appointments  Date Time Provider Department Center   10/17/2018 2:00 PM RN 5 [facility]   10/18/2018 6:00 PM RN 4 [facility]   10/19/2018 3:30 PM RN 7 [facility]   10/20/2018 2:30 PM RN 1 [facility]   10/21/2018 6:00 PM RN 4 [facility]   10/22/2018 4:00 PM RN 7 [facility]   10/23/2018 6:00 PM RN 3 [facility]   10/24/2018 5:00 PM RN 5 [facility]   10/25/2018 4:00 PM RN 5 [facility]   10/26/2018 4:00 PM RN 7 [facility]   10/27/2018 4:00 PM RN 2 [facility]   10/28/2018 6:00 PM RN 3 [facility]   10/29/2018 5:00 PM RN 6 [facility]   10/30/2018 5:00 PM RN 5 [facility]   10/31/2018 5:00 PM RN 3 [facility]   11/1/2018 5:00 PM RN 5 [facility]   11/2/2018 8:00 AM Naseem Brunner M.D. ND [address] [place] [address]   11/2/2018 5:00 PM RN 4 [facility]   11/3/2018 6:00 PM RN 3 [facility]   11/4/2018 5:00 PM RN 4 [facility]    11/5/2018 5:00 PM RN 5 [facility]   11/6/2018 5:00 PM RN 3 [facility]   11/7/2018 5:00 PM RN 5 [facility]   11/8/2018 6:00 PM RN 4 [facility]   11/9/2018 5:00 PM RN 3 [facility]   11/10/2018 5:00 PM RN 4 [facility]     No follow-up provider specified.    MEDICATIONS ON DISCHARGE     Medication List      START taking these medications      Instructions   amLODIPine 5 MG Tabs  Commonly known as:  NORVASC   Take 1 Tab by mouth every day.  Dose:  5 mg     NS SOLN 100 mL with ertapenem 1 GM SOLR 1,000 mg   1,000 mg by Intravenous route Once for 1 dose.  Dose:  1 g        CONTINUE taking these medications      Instructions   glimepiride 4 MG Tabs  Commonly known as:  AMARYL   Take 4 mg by mouth every morning.  Dose:  4 mg     lisinopril-hydrochlorothiazide 20-12.5 MG per tablet  Commonly known as:  PRINZIDE, ZESTORETIC   Take 1 Tab by mouth every morning.  Dose:  1 Tab     lovastatin 40 MG tablet  Commonly known as:  MEVACOR   Take 40 mg by mouth every morning.  Dose:  40 mg     metFORMIN 850 MG Tabs  Commonly known as:  GLUCOPHAGE   Take 850 mg by mouth 3 times a day.  Dose:  850 mg            Allergies  No Known Allergies    DIET  Orders Placed This Encounter   Procedures   • Diet Order Diabetic (2000 ADA)     Standing Status:   Standing     Number of Occurrences:   1     Order Specific Question:   Diet:     Answer:   Diabetic [3]     Comments:   2000 ADA     Order Specific Question:   Calorie modifications:     Answer:   2000 kcals [5]       ACTIVITY  As tolerated.  Weight bearing as tolerated    CONSULTATIONS  Infectious disease  Orthopedics    PROCEDURES  As discussed above     LABORATORY  Lab Results   Component Value Date    SODIUM 137 10/16/2018    POTASSIUM 4.0 10/16/2018    CHLORIDE 103 10/16/2018    CO2 26 10/16/2018    GLUCOSE 130 (H) 10/16/2018    BUN 37 (H) 10/16/2018    CREATININE 0.97 10/16/2018        Lab Results   Component Value Date    WBC 9.3 10/16/2018    HEMOGLOBIN  14.8 10/16/2018    HEMATOCRIT 42.0 10/16/2018    PLATELETCT 310 10/16/2018        Total time of the discharge process exceeds 32 minutes.

## 2018-10-16 NOTE — PROGRESS NOTES
Continues on IV Unasyn via right arm PICC line for treatment of osteo/amputation of left fourth toe; scheduled to receive antibiotics until 11/10/18. Vitals WNL. Patient calm, appropriate, polite, cheerful, sociable, calm this evening. Denies all pain, numbness, and tingling to left foot. Ambulating around unit. Wearing prescribed shoe to left foot when OOB, and complying with orders to place weight on left heel only. Gait is steady and independent. Reports having a BM today. Assessment benign other than C/D/I dsg. to left foot covering site of amputated fourth toe. Dressing orders and the left foot dressing itself were changed today on previous shift. Recently received 2 units of SCI in order to maintain blood sugars < 150.

## 2018-10-17 ENCOUNTER — OUTPATIENT INFUSION SERVICES (OUTPATIENT)
Dept: ONCOLOGY | Facility: MEDICAL CENTER | Age: 63
End: 2018-10-17
Attending: NURSE PRACTITIONER
Payer: MEDICAID

## 2018-10-17 VITALS
SYSTOLIC BLOOD PRESSURE: 155 MMHG | WEIGHT: 173.5 LBS | TEMPERATURE: 97.4 F | HEART RATE: 86 BPM | OXYGEN SATURATION: 97 % | DIASTOLIC BLOOD PRESSURE: 80 MMHG | HEIGHT: 69 IN | RESPIRATION RATE: 18 BRPM | BODY MASS INDEX: 25.7 KG/M2

## 2018-10-17 DIAGNOSIS — E11.628 DIABETIC FOOT INFECTION (HCC): ICD-10-CM

## 2018-10-17 DIAGNOSIS — L08.9 DIABETIC FOOT INFECTION (HCC): ICD-10-CM

## 2018-10-17 PROCEDURE — 96365 THER/PROPH/DIAG IV INF INIT: CPT

## 2018-10-17 PROCEDURE — 700111 HCHG RX REV CODE 636 W/ 250 OVERRIDE (IP): Performed by: NURSE PRACTITIONER

## 2018-10-17 PROCEDURE — 700105 HCHG RX REV CODE 258: Performed by: NURSE PRACTITIONER

## 2018-10-17 RX ORDER — 0.9 % SODIUM CHLORIDE 0.9 %
10-20 VIAL (ML) INJECTION PRN
Status: CANCELLED | OUTPATIENT
Start: 2018-10-17

## 2018-10-17 RX ORDER — 0.9 % SODIUM CHLORIDE 0.9 %
5 VIAL (ML) INJECTION PRN
Status: CANCELLED | OUTPATIENT
Start: 2018-10-17

## 2018-10-17 RX ADMIN — SODIUM CHLORIDE 1000 MG: 900 INJECTION INTRAVENOUS at 14:15

## 2018-10-17 ASSESSMENT — PAIN SCALES - GENERAL: PAINLEVEL_OUTOF10: 0

## 2018-10-17 NOTE — PROGRESS NOTES
Mr Estrada returns for IVABX. Invanz infused as ordered. POC discussed and questions answered, including appointment to dietitian, ID and wound care. Sumit tolerated well and without incident. PICC line in good condition and has positive blood return. PICC line flushed with saline per protocol. Sumit DC'd home in good condition. Returns daily.

## 2018-10-18 ENCOUNTER — OUTPATIENT INFUSION SERVICES (OUTPATIENT)
Dept: ONCOLOGY | Facility: MEDICAL CENTER | Age: 63
End: 2018-10-18
Attending: NURSE PRACTITIONER
Payer: MEDICAID

## 2018-10-18 VITALS
OXYGEN SATURATION: 98 % | RESPIRATION RATE: 18 BRPM | SYSTOLIC BLOOD PRESSURE: 128 MMHG | HEART RATE: 86 BPM | DIASTOLIC BLOOD PRESSURE: 76 MMHG | TEMPERATURE: 98.6 F

## 2018-10-18 DIAGNOSIS — E11.628 DIABETIC FOOT INFECTION (HCC): ICD-10-CM

## 2018-10-18 DIAGNOSIS — L08.9 DIABETIC FOOT INFECTION (HCC): ICD-10-CM

## 2018-10-18 PROCEDURE — 700111 HCHG RX REV CODE 636 W/ 250 OVERRIDE (IP): Performed by: NURSE PRACTITIONER

## 2018-10-18 PROCEDURE — 96365 THER/PROPH/DIAG IV INF INIT: CPT

## 2018-10-18 PROCEDURE — 700105 HCHG RX REV CODE 258: Performed by: NURSE PRACTITIONER

## 2018-10-18 RX ORDER — 0.9 % SODIUM CHLORIDE 0.9 %
10-20 VIAL (ML) INJECTION PRN
Status: CANCELLED | OUTPATIENT
Start: 2018-10-18

## 2018-10-18 RX ORDER — 0.9 % SODIUM CHLORIDE 0.9 %
5 VIAL (ML) INJECTION PRN
Status: CANCELLED | OUTPATIENT
Start: 2018-10-18

## 2018-10-18 RX ADMIN — SODIUM CHLORIDE 1000 MG: 900 INJECTION INTRAVENOUS at 17:46

## 2018-10-18 ASSESSMENT — PAIN SCALES - GENERAL: PAINLEVEL_OUTOF10: 0

## 2018-10-19 ENCOUNTER — OUTPATIENT INFUSION SERVICES (OUTPATIENT)
Dept: ONCOLOGY | Facility: MEDICAL CENTER | Age: 63
End: 2018-10-19
Attending: NURSE PRACTITIONER
Payer: MEDICAID

## 2018-10-19 VITALS
TEMPERATURE: 98.5 F | HEART RATE: 83 BPM | RESPIRATION RATE: 18 BRPM | SYSTOLIC BLOOD PRESSURE: 131 MMHG | DIASTOLIC BLOOD PRESSURE: 78 MMHG | OXYGEN SATURATION: 98 %

## 2018-10-19 DIAGNOSIS — E11.628 DIABETIC FOOT INFECTION (HCC): ICD-10-CM

## 2018-10-19 DIAGNOSIS — L08.9 DIABETIC FOOT INFECTION (HCC): ICD-10-CM

## 2018-10-19 PROCEDURE — 700111 HCHG RX REV CODE 636 W/ 250 OVERRIDE (IP): Performed by: NURSE PRACTITIONER

## 2018-10-19 PROCEDURE — 96365 THER/PROPH/DIAG IV INF INIT: CPT

## 2018-10-19 PROCEDURE — 700105 HCHG RX REV CODE 258: Performed by: NURSE PRACTITIONER

## 2018-10-19 RX ORDER — 0.9 % SODIUM CHLORIDE 0.9 %
10-20 VIAL (ML) INJECTION PRN
Status: CANCELLED | OUTPATIENT
Start: 2018-10-19

## 2018-10-19 RX ORDER — 0.9 % SODIUM CHLORIDE 0.9 %
5 VIAL (ML) INJECTION PRN
Status: CANCELLED | OUTPATIENT
Start: 2018-10-19

## 2018-10-19 RX ADMIN — SODIUM CHLORIDE 1000 MG: 900 INJECTION INTRAVENOUS at 15:32

## 2018-10-19 ASSESSMENT — PAIN SCALES - GENERAL: PAINLEVEL_OUTOF10: 0

## 2018-10-19 NOTE — PROGRESS NOTES
Pt presented to infusion center for daily Invanz. Pt reports no significant changes since yesterday. Right arm PICC line in place, flushed and brisk blood return observed. Invanz infused with no s/s of adverse reaction. PICC line flushed, brisk blood return again observed, wrapped in gauze and protective sleeve. Has next appt, left on foot in good spirits.

## 2018-10-19 NOTE — PROGRESS NOTES
Patient arrived for daily Invanz; reports no changes from yesterday's treatment. Reviewed upcoming appts with patient as well.  Pt reports home wound care is going ok, no issues currently.  PICC flushed and patent. Invanz infused per MAR.  PICC line flushed per protocol.  Gauze dressing applied. Confirmed pt's next appt. Discharged home in no apparent distress.

## 2018-10-20 ENCOUNTER — OUTPATIENT INFUSION SERVICES (OUTPATIENT)
Dept: ONCOLOGY | Facility: MEDICAL CENTER | Age: 63
End: 2018-10-20
Attending: NURSE PRACTITIONER
Payer: MEDICAID

## 2018-10-20 VITALS
SYSTOLIC BLOOD PRESSURE: 125 MMHG | RESPIRATION RATE: 18 BRPM | HEART RATE: 86 BPM | TEMPERATURE: 98.2 F | OXYGEN SATURATION: 100 % | DIASTOLIC BLOOD PRESSURE: 64 MMHG

## 2018-10-20 DIAGNOSIS — L08.9 DIABETIC FOOT INFECTION (HCC): ICD-10-CM

## 2018-10-20 DIAGNOSIS — E11.628 DIABETIC FOOT INFECTION (HCC): ICD-10-CM

## 2018-10-20 PROCEDURE — 700105 HCHG RX REV CODE 258: Performed by: NURSE PRACTITIONER

## 2018-10-20 PROCEDURE — 700111 HCHG RX REV CODE 636 W/ 250 OVERRIDE (IP): Performed by: NURSE PRACTITIONER

## 2018-10-20 PROCEDURE — 96365 THER/PROPH/DIAG IV INF INIT: CPT

## 2018-10-20 RX ORDER — 0.9 % SODIUM CHLORIDE 0.9 %
10-20 VIAL (ML) INJECTION PRN
Status: CANCELLED | OUTPATIENT
Start: 2018-10-20

## 2018-10-20 RX ORDER — 0.9 % SODIUM CHLORIDE 0.9 %
5 VIAL (ML) INJECTION PRN
Status: CANCELLED | OUTPATIENT
Start: 2018-10-20

## 2018-10-20 RX ADMIN — SODIUM CHLORIDE 1000 MG: 900 INJECTION INTRAVENOUS at 14:34

## 2018-10-20 ASSESSMENT — PAIN SCALES - GENERAL: PAINLEVEL_OUTOF10: 0

## 2018-10-21 ENCOUNTER — OUTPATIENT INFUSION SERVICES (OUTPATIENT)
Dept: ONCOLOGY | Facility: MEDICAL CENTER | Age: 63
End: 2018-10-21
Attending: NURSE PRACTITIONER
Payer: MEDICAID

## 2018-10-21 VITALS
BODY MASS INDEX: 25.62 KG/M2 | DIASTOLIC BLOOD PRESSURE: 73 MMHG | TEMPERATURE: 98.6 F | HEART RATE: 85 BPM | OXYGEN SATURATION: 98 % | SYSTOLIC BLOOD PRESSURE: 108 MMHG | WEIGHT: 173.5 LBS | RESPIRATION RATE: 18 BRPM

## 2018-10-21 DIAGNOSIS — L08.9 DIABETIC FOOT INFECTION (HCC): ICD-10-CM

## 2018-10-21 DIAGNOSIS — E11.628 DIABETIC FOOT INFECTION (HCC): ICD-10-CM

## 2018-10-21 PROCEDURE — 96365 THER/PROPH/DIAG IV INF INIT: CPT

## 2018-10-21 PROCEDURE — 700111 HCHG RX REV CODE 636 W/ 250 OVERRIDE (IP): Performed by: NURSE PRACTITIONER

## 2018-10-21 PROCEDURE — 700105 HCHG RX REV CODE 258: Performed by: NURSE PRACTITIONER

## 2018-10-21 RX ORDER — 0.9 % SODIUM CHLORIDE 0.9 %
10-20 VIAL (ML) INJECTION PRN
Status: CANCELLED | OUTPATIENT
Start: 2018-10-21

## 2018-10-21 RX ORDER — 0.9 % SODIUM CHLORIDE 0.9 %
5 VIAL (ML) INJECTION PRN
Status: CANCELLED | OUTPATIENT
Start: 2018-10-21

## 2018-10-21 RX ADMIN — SODIUM CHLORIDE 1000 MG: 900 INJECTION INTRAVENOUS at 17:50

## 2018-10-21 ASSESSMENT — PAIN SCALES - GENERAL: PAINLEVEL_OUTOF10: 0

## 2018-10-22 ENCOUNTER — OUTPATIENT INFUSION SERVICES (OUTPATIENT)
Dept: ONCOLOGY | Facility: MEDICAL CENTER | Age: 63
End: 2018-10-22
Attending: NURSE PRACTITIONER
Payer: MEDICAID

## 2018-10-22 VITALS
WEIGHT: 173.5 LBS | BODY MASS INDEX: 25.7 KG/M2 | RESPIRATION RATE: 18 BRPM | DIASTOLIC BLOOD PRESSURE: 69 MMHG | TEMPERATURE: 97.8 F | HEART RATE: 92 BPM | HEIGHT: 69 IN | SYSTOLIC BLOOD PRESSURE: 115 MMHG

## 2018-10-22 DIAGNOSIS — E11.628 DIABETIC FOOT INFECTION (HCC): ICD-10-CM

## 2018-10-22 DIAGNOSIS — L08.9 DIABETIC FOOT INFECTION (HCC): ICD-10-CM

## 2018-10-22 LAB
ALBUMIN SERPL BCP-MCNC: 3.6 G/DL (ref 3.2–4.9)
ALBUMIN/GLOB SERPL: 1 G/DL
ALP SERPL-CCNC: 91 U/L (ref 30–99)
ALT SERPL-CCNC: 38 U/L (ref 2–50)
ANION GAP SERPL CALC-SCNC: 7 MMOL/L (ref 0–11.9)
AST SERPL-CCNC: 30 U/L (ref 12–45)
BASOPHILS # BLD AUTO: 0.6 % (ref 0–1.8)
BASOPHILS # BLD: 0.05 K/UL (ref 0–0.12)
BILIRUB SERPL-MCNC: 0.4 MG/DL (ref 0.1–1.5)
BUN SERPL-MCNC: 45 MG/DL (ref 8–22)
CALCIUM SERPL-MCNC: 9.5 MG/DL (ref 8.5–10.5)
CHLORIDE SERPL-SCNC: 103 MMOL/L (ref 96–112)
CO2 SERPL-SCNC: 25 MMOL/L (ref 20–33)
CREAT SERPL-MCNC: 1.28 MG/DL (ref 0.5–1.4)
CRP SERPL HS-MCNC: 0.04 MG/DL (ref 0–0.75)
EOSINOPHIL # BLD AUTO: 0.32 K/UL (ref 0–0.51)
EOSINOPHIL NFR BLD: 3.8 % (ref 0–6.9)
ERYTHROCYTE [DISTWIDTH] IN BLOOD BY AUTOMATED COUNT: 45.6 FL (ref 35.9–50)
ERYTHROCYTE [SEDIMENTATION RATE] IN BLOOD BY WESTERGREN METHOD: 14 MM/HOUR (ref 0–20)
GLOBULIN SER CALC-MCNC: 3.6 G/DL (ref 1.9–3.5)
GLUCOSE SERPL-MCNC: 276 MG/DL (ref 65–99)
HCT VFR BLD AUTO: 43.1 % (ref 42–52)
HGB BLD-MCNC: 14.6 G/DL (ref 14–18)
IMM GRANULOCYTES # BLD AUTO: 0.03 K/UL (ref 0–0.11)
IMM GRANULOCYTES NFR BLD AUTO: 0.4 % (ref 0–0.9)
LYMPHOCYTES # BLD AUTO: 1.85 K/UL (ref 1–4.8)
LYMPHOCYTES NFR BLD: 22.1 % (ref 22–41)
MCH RBC QN AUTO: 30.9 PG (ref 27–33)
MCHC RBC AUTO-ENTMCNC: 33.9 G/DL (ref 33.7–35.3)
MCV RBC AUTO: 91.3 FL (ref 81.4–97.8)
MONOCYTES # BLD AUTO: 0.53 K/UL (ref 0–0.85)
MONOCYTES NFR BLD AUTO: 6.3 % (ref 0–13.4)
NEUTROPHILS # BLD AUTO: 5.58 K/UL (ref 1.82–7.42)
NEUTROPHILS NFR BLD: 66.8 % (ref 44–72)
NRBC # BLD AUTO: 0 K/UL
NRBC BLD-RTO: 0 /100 WBC
PLATELET # BLD AUTO: 240 K/UL (ref 164–446)
PMV BLD AUTO: 11.3 FL (ref 9–12.9)
POTASSIUM SERPL-SCNC: 4.4 MMOL/L (ref 3.6–5.5)
PROT SERPL-MCNC: 7.2 G/DL (ref 6–8.2)
RBC # BLD AUTO: 4.72 M/UL (ref 4.7–6.1)
SODIUM SERPL-SCNC: 135 MMOL/L (ref 135–145)
WBC # BLD AUTO: 8.4 K/UL (ref 4.8–10.8)

## 2018-10-22 PROCEDURE — 85652 RBC SED RATE AUTOMATED: CPT

## 2018-10-22 PROCEDURE — 80053 COMPREHEN METABOLIC PANEL: CPT

## 2018-10-22 PROCEDURE — 700111 HCHG RX REV CODE 636 W/ 250 OVERRIDE (IP): Performed by: NURSE PRACTITIONER

## 2018-10-22 PROCEDURE — 700105 HCHG RX REV CODE 258: Performed by: NURSE PRACTITIONER

## 2018-10-22 PROCEDURE — 96365 THER/PROPH/DIAG IV INF INIT: CPT

## 2018-10-22 PROCEDURE — 85025 COMPLETE CBC W/AUTO DIFF WBC: CPT

## 2018-10-22 PROCEDURE — 86140 C-REACTIVE PROTEIN: CPT

## 2018-10-22 PROCEDURE — 36592 COLLECT BLOOD FROM PICC: CPT

## 2018-10-22 RX ORDER — 0.9 % SODIUM CHLORIDE 0.9 %
10-20 VIAL (ML) INJECTION PRN
Status: CANCELLED | OUTPATIENT
Start: 2018-10-22

## 2018-10-22 RX ORDER — 0.9 % SODIUM CHLORIDE 0.9 %
5 VIAL (ML) INJECTION PRN
Status: CANCELLED | OUTPATIENT
Start: 2018-10-22

## 2018-10-22 RX ADMIN — SODIUM CHLORIDE 1000 MG: 900 INJECTION INTRAVENOUS at 16:08

## 2018-10-22 ASSESSMENT — PAIN SCALES - GENERAL: PAINLEVEL_OUTOF10: 0

## 2018-10-22 NOTE — PROGRESS NOTES
Returns for IVAB to tx L foot infection and s/p amputation of L 4th toe.  Reports small amt of red/clear drainage.  Tx infused without rx.  Some fatigue after tx, states sugars improving, but did not offer number.  PICC dssg change and line saline flush after tx.  DC to self care.

## 2018-10-23 ENCOUNTER — OUTPATIENT INFUSION SERVICES (OUTPATIENT)
Dept: ONCOLOGY | Facility: MEDICAL CENTER | Age: 63
End: 2018-10-23
Attending: NURSE PRACTITIONER
Payer: MEDICAID

## 2018-10-23 VITALS
DIASTOLIC BLOOD PRESSURE: 71 MMHG | HEART RATE: 84 BPM | RESPIRATION RATE: 18 BRPM | TEMPERATURE: 97.9 F | OXYGEN SATURATION: 98 % | SYSTOLIC BLOOD PRESSURE: 104 MMHG

## 2018-10-23 DIAGNOSIS — L08.9 DIABETIC FOOT INFECTION (HCC): ICD-10-CM

## 2018-10-23 DIAGNOSIS — E11.628 DIABETIC FOOT INFECTION (HCC): ICD-10-CM

## 2018-10-23 PROCEDURE — 700111 HCHG RX REV CODE 636 W/ 250 OVERRIDE (IP): Performed by: NURSE PRACTITIONER

## 2018-10-23 PROCEDURE — 700105 HCHG RX REV CODE 258: Performed by: NURSE PRACTITIONER

## 2018-10-23 PROCEDURE — 96365 THER/PROPH/DIAG IV INF INIT: CPT

## 2018-10-23 RX ORDER — 0.9 % SODIUM CHLORIDE 0.9 %
10-20 VIAL (ML) INJECTION PRN
Status: CANCELLED | OUTPATIENT
Start: 2018-10-23

## 2018-10-23 RX ORDER — 0.9 % SODIUM CHLORIDE 0.9 %
5 VIAL (ML) INJECTION PRN
Status: CANCELLED | OUTPATIENT
Start: 2018-10-23

## 2018-10-23 RX ADMIN — SODIUM CHLORIDE 1000 MG: 900 INJECTION INTRAVENOUS at 18:07

## 2018-10-23 ASSESSMENT — PAIN SCALES - GENERAL: PAINLEVEL_OUTOF10: 0

## 2018-10-24 ENCOUNTER — OUTPATIENT INFUSION SERVICES (OUTPATIENT)
Dept: ONCOLOGY | Facility: MEDICAL CENTER | Age: 63
End: 2018-10-24
Attending: NURSE PRACTITIONER
Payer: MEDICAID

## 2018-10-24 VITALS
DIASTOLIC BLOOD PRESSURE: 67 MMHG | TEMPERATURE: 97.1 F | SYSTOLIC BLOOD PRESSURE: 107 MMHG | OXYGEN SATURATION: 99 % | HEART RATE: 97 BPM | BODY MASS INDEX: 25.61 KG/M2 | RESPIRATION RATE: 18 BRPM | WEIGHT: 173.5 LBS

## 2018-10-24 DIAGNOSIS — L08.9 DIABETIC FOOT INFECTION (HCC): ICD-10-CM

## 2018-10-24 DIAGNOSIS — E11.628 DIABETIC FOOT INFECTION (HCC): ICD-10-CM

## 2018-10-24 LAB
FUNGUS SPEC CULT: NORMAL
SIGNIFICANT IND 70042: NORMAL
SITE SITE: NORMAL
SOURCE SOURCE: NORMAL

## 2018-10-24 PROCEDURE — 700111 HCHG RX REV CODE 636 W/ 250 OVERRIDE (IP): Performed by: NURSE PRACTITIONER

## 2018-10-24 PROCEDURE — 700105 HCHG RX REV CODE 258: Performed by: NURSE PRACTITIONER

## 2018-10-24 PROCEDURE — 96365 THER/PROPH/DIAG IV INF INIT: CPT

## 2018-10-24 RX ORDER — 0.9 % SODIUM CHLORIDE 0.9 %
5 VIAL (ML) INJECTION PRN
Status: CANCELLED | OUTPATIENT
Start: 2018-10-24

## 2018-10-24 RX ORDER — 0.9 % SODIUM CHLORIDE 0.9 %
10-20 VIAL (ML) INJECTION PRN
Status: CANCELLED | OUTPATIENT
Start: 2018-10-24

## 2018-10-24 RX ADMIN — SODIUM CHLORIDE 1000 MG: 900 INJECTION INTRAVENOUS at 16:57

## 2018-10-24 ASSESSMENT — PAIN SCALES - GENERAL: PAINLEVEL_OUTOF10: 0

## 2018-10-24 NOTE — PROGRESS NOTES
Patient arrived for daily Invanz; reports no changes from yesterday's treatment. PICC flushed and patent. Invanz infused per MAR.  PICC line flushed per protocol.  Gauze dressing applied. Confirmed pt's next appt. Discharged home in no apparent distress.

## 2018-10-25 ENCOUNTER — OUTPATIENT INFUSION SERVICES (OUTPATIENT)
Dept: ONCOLOGY | Facility: MEDICAL CENTER | Age: 63
End: 2018-10-25
Attending: NURSE PRACTITIONER
Payer: MEDICAID

## 2018-10-25 VITALS
TEMPERATURE: 97.5 F | WEIGHT: 171.3 LBS | BODY MASS INDEX: 25.37 KG/M2 | HEART RATE: 93 BPM | OXYGEN SATURATION: 97 % | RESPIRATION RATE: 18 BRPM | DIASTOLIC BLOOD PRESSURE: 64 MMHG | HEIGHT: 69 IN | SYSTOLIC BLOOD PRESSURE: 127 MMHG

## 2018-10-25 DIAGNOSIS — E11.628 DIABETIC FOOT INFECTION (HCC): ICD-10-CM

## 2018-10-25 DIAGNOSIS — L08.9 DIABETIC FOOT INFECTION (HCC): ICD-10-CM

## 2018-10-25 PROCEDURE — 700105 HCHG RX REV CODE 258: Performed by: NURSE PRACTITIONER

## 2018-10-25 PROCEDURE — 96365 THER/PROPH/DIAG IV INF INIT: CPT

## 2018-10-25 PROCEDURE — 700111 HCHG RX REV CODE 636 W/ 250 OVERRIDE (IP): Performed by: NURSE PRACTITIONER

## 2018-10-25 RX ORDER — 0.9 % SODIUM CHLORIDE 0.9 %
5 VIAL (ML) INJECTION PRN
Status: CANCELLED | OUTPATIENT
Start: 2018-10-25

## 2018-10-25 RX ORDER — 0.9 % SODIUM CHLORIDE 0.9 %
10-20 VIAL (ML) INJECTION PRN
Status: CANCELLED | OUTPATIENT
Start: 2018-10-25

## 2018-10-25 RX ADMIN — SODIUM CHLORIDE 1000 MG: 900 INJECTION INTRAVENOUS at 16:26

## 2018-10-25 ASSESSMENT — PAIN SCALES - GENERAL: PAINLEVEL_OUTOF10: 0

## 2018-10-25 NOTE — PROGRESS NOTES
Pt arrives ambulatory to [facility] accompanied by self.  He is here for daily IV ABX.  Pt denies gastric upset.  IV ABX infused as ordered, pt tolerated well, no evidence of adverse effects noted or expressed.  PICC flushes well, brisk blood return noted.  Pt dc'd to self care in no apparent distress.  Returns daily.

## 2018-10-26 ENCOUNTER — OUTPATIENT INFUSION SERVICES (OUTPATIENT)
Dept: ONCOLOGY | Facility: MEDICAL CENTER | Age: 63
End: 2018-10-26
Attending: NURSE PRACTITIONER
Payer: MEDICAID

## 2018-10-26 VITALS
HEART RATE: 96 BPM | OXYGEN SATURATION: 99 % | TEMPERATURE: 98.4 F | SYSTOLIC BLOOD PRESSURE: 121 MMHG | DIASTOLIC BLOOD PRESSURE: 86 MMHG | RESPIRATION RATE: 18 BRPM

## 2018-10-26 DIAGNOSIS — L08.9 DIABETIC FOOT INFECTION (HCC): ICD-10-CM

## 2018-10-26 DIAGNOSIS — E11.628 DIABETIC FOOT INFECTION (HCC): ICD-10-CM

## 2018-10-26 PROCEDURE — 700105 HCHG RX REV CODE 258: Performed by: NURSE PRACTITIONER

## 2018-10-26 PROCEDURE — 96365 THER/PROPH/DIAG IV INF INIT: CPT

## 2018-10-26 PROCEDURE — 700111 HCHG RX REV CODE 636 W/ 250 OVERRIDE (IP): Performed by: NURSE PRACTITIONER

## 2018-10-26 RX ORDER — 0.9 % SODIUM CHLORIDE 0.9 %
10-20 VIAL (ML) INJECTION PRN
Status: CANCELLED | OUTPATIENT
Start: 2018-10-26

## 2018-10-26 RX ORDER — 0.9 % SODIUM CHLORIDE 0.9 %
5 VIAL (ML) INJECTION PRN
Status: CANCELLED | OUTPATIENT
Start: 2018-10-26

## 2018-10-26 RX ADMIN — SODIUM CHLORIDE 1000 MG: 900 INJECTION INTRAVENOUS at 16:24

## 2018-10-26 ASSESSMENT — PAIN SCALES - GENERAL: PAINLEVEL_OUTOF10: 0

## 2018-10-27 ENCOUNTER — OUTPATIENT INFUSION SERVICES (OUTPATIENT)
Dept: ONCOLOGY | Facility: MEDICAL CENTER | Age: 63
End: 2018-10-27
Attending: NURSE PRACTITIONER
Payer: MEDICAID

## 2018-10-27 VITALS
SYSTOLIC BLOOD PRESSURE: 113 MMHG | OXYGEN SATURATION: 99 % | TEMPERATURE: 97.8 F | DIASTOLIC BLOOD PRESSURE: 66 MMHG | RESPIRATION RATE: 18 BRPM | HEART RATE: 95 BPM

## 2018-10-27 DIAGNOSIS — E11.628 DIABETIC FOOT INFECTION (HCC): ICD-10-CM

## 2018-10-27 DIAGNOSIS — L08.9 DIABETIC FOOT INFECTION (HCC): ICD-10-CM

## 2018-10-27 PROCEDURE — 700111 HCHG RX REV CODE 636 W/ 250 OVERRIDE (IP): Performed by: NURSE PRACTITIONER

## 2018-10-27 PROCEDURE — 96365 THER/PROPH/DIAG IV INF INIT: CPT

## 2018-10-27 PROCEDURE — 700105 HCHG RX REV CODE 258: Performed by: NURSE PRACTITIONER

## 2018-10-27 RX ORDER — 0.9 % SODIUM CHLORIDE 0.9 %
10-20 VIAL (ML) INJECTION PRN
Status: CANCELLED | OUTPATIENT
Start: 2018-10-27

## 2018-10-27 RX ORDER — 0.9 % SODIUM CHLORIDE 0.9 %
5 VIAL (ML) INJECTION PRN
Status: CANCELLED | OUTPATIENT
Start: 2018-10-27

## 2018-10-27 RX ADMIN — SODIUM CHLORIDE 1000 MG: 900 INJECTION INTRAVENOUS at 16:04

## 2018-10-27 ASSESSMENT — PAIN SCALES - GENERAL: PAINLEVEL_OUTOF10: 0

## 2018-10-27 NOTE — PROGRESS NOTES
Pt to [facility] for daily Invanz infusion for left toe DM OM.  Pt PICC line flushed per protocol w/ brisk blood return noted, flushed per protocol, clave changed per protocol.  Invanz infused through PICC with no adverse reactions.  Pt PICC again flushed per protocol w/ brisk blood return noted, wrapped in gauze and protective sleeve placed.  Pt left on foot in NAD, next appt in place

## 2018-10-27 NOTE — PROGRESS NOTES
Pt is here for his scheduled IVABX. Voices no concerns. LLE walking boot in place - denies pain. Infusion completed without an incident. Discharged home to self care. Retuns daily.

## 2018-10-28 ENCOUNTER — OUTPATIENT INFUSION SERVICES (OUTPATIENT)
Dept: ONCOLOGY | Facility: MEDICAL CENTER | Age: 63
End: 2018-10-28
Attending: NURSE PRACTITIONER
Payer: MEDICAID

## 2018-10-28 VITALS
OXYGEN SATURATION: 98 % | WEIGHT: 171.3 LBS | DIASTOLIC BLOOD PRESSURE: 75 MMHG | SYSTOLIC BLOOD PRESSURE: 140 MMHG | TEMPERATURE: 97.7 F | HEART RATE: 83 BPM | BODY MASS INDEX: 25.28 KG/M2 | RESPIRATION RATE: 18 BRPM

## 2018-10-28 DIAGNOSIS — L08.9 DIABETIC FOOT INFECTION (HCC): ICD-10-CM

## 2018-10-28 DIAGNOSIS — E11.628 DIABETIC FOOT INFECTION (HCC): ICD-10-CM

## 2018-10-28 PROCEDURE — 700105 HCHG RX REV CODE 258: Performed by: NURSE PRACTITIONER

## 2018-10-28 PROCEDURE — 96365 THER/PROPH/DIAG IV INF INIT: CPT

## 2018-10-28 PROCEDURE — 700111 HCHG RX REV CODE 636 W/ 250 OVERRIDE (IP): Performed by: NURSE PRACTITIONER

## 2018-10-28 RX ORDER — 0.9 % SODIUM CHLORIDE 0.9 %
10-20 VIAL (ML) INJECTION PRN
Status: CANCELLED | OUTPATIENT
Start: 2018-10-28

## 2018-10-28 RX ORDER — 0.9 % SODIUM CHLORIDE 0.9 %
5 VIAL (ML) INJECTION PRN
Status: CANCELLED | OUTPATIENT
Start: 2018-10-28

## 2018-10-28 RX ADMIN — SODIUM CHLORIDE 1000 MG: 900 INJECTION INTRAVENOUS at 17:55

## 2018-10-28 ASSESSMENT — PAIN SCALES - GENERAL: PAINLEVEL_OUTOF10: 0

## 2018-10-29 ENCOUNTER — OFFICE VISIT (OUTPATIENT)
Dept: INFECTIOUS DISEASES | Facility: MEDICAL CENTER | Age: 63
End: 2018-10-29
Payer: MEDICAID

## 2018-10-29 ENCOUNTER — OUTPATIENT INFUSION SERVICES (OUTPATIENT)
Dept: ONCOLOGY | Facility: MEDICAL CENTER | Age: 63
End: 2018-10-29
Attending: NURSE PRACTITIONER
Payer: MEDICAID

## 2018-10-29 VITALS
DIASTOLIC BLOOD PRESSURE: 75 MMHG | HEART RATE: 103 BPM | OXYGEN SATURATION: 98 % | BODY MASS INDEX: 25.57 KG/M2 | RESPIRATION RATE: 18 BRPM | HEIGHT: 69 IN | TEMPERATURE: 98.1 F | WEIGHT: 172.62 LBS | SYSTOLIC BLOOD PRESSURE: 142 MMHG

## 2018-10-29 VITALS
BODY MASS INDEX: 24.05 KG/M2 | OXYGEN SATURATION: 97 % | HEART RATE: 85 BPM | TEMPERATURE: 97.9 F | HEIGHT: 70 IN | SYSTOLIC BLOOD PRESSURE: 126 MMHG | DIASTOLIC BLOOD PRESSURE: 66 MMHG | WEIGHT: 168 LBS

## 2018-10-29 DIAGNOSIS — E11.8 TYPE 2 DIABETES MELLITUS WITH COMPLICATION, WITHOUT LONG-TERM CURRENT USE OF INSULIN (HCC): Chronic | ICD-10-CM

## 2018-10-29 DIAGNOSIS — A49.8 METHICILLIN SUSCEPTIBLE STAPHYLOCOCCUS EPIDERMIDIS INFECTION: ICD-10-CM

## 2018-10-29 DIAGNOSIS — A49.1 STREPTOCOCCUS VIRIDANS INFECTION: ICD-10-CM

## 2018-10-29 DIAGNOSIS — L08.9 DIABETIC FOOT INFECTION (HCC): ICD-10-CM

## 2018-10-29 DIAGNOSIS — E11.628 DIABETIC FOOT INFECTION (HCC): ICD-10-CM

## 2018-10-29 LAB
ALBUMIN SERPL BCP-MCNC: 3.8 G/DL (ref 3.2–4.9)
ALBUMIN/GLOB SERPL: 1.1 G/DL
ALP SERPL-CCNC: 92 U/L (ref 30–99)
ALT SERPL-CCNC: 42 U/L (ref 2–50)
ANION GAP SERPL CALC-SCNC: 8 MMOL/L (ref 0–11.9)
AST SERPL-CCNC: 32 U/L (ref 12–45)
BASOPHILS # BLD AUTO: 0.8 % (ref 0–1.8)
BASOPHILS # BLD: 0.08 K/UL (ref 0–0.12)
BILIRUB SERPL-MCNC: 0.4 MG/DL (ref 0.1–1.5)
BUN SERPL-MCNC: 47 MG/DL (ref 8–22)
CALCIUM SERPL-MCNC: 9.5 MG/DL (ref 8.5–10.5)
CHLORIDE SERPL-SCNC: 104 MMOL/L (ref 96–112)
CO2 SERPL-SCNC: 23 MMOL/L (ref 20–33)
CREAT SERPL-MCNC: 1.08 MG/DL (ref 0.5–1.4)
EOSINOPHIL # BLD AUTO: 0.36 K/UL (ref 0–0.51)
EOSINOPHIL NFR BLD: 3.8 % (ref 0–6.9)
ERYTHROCYTE [DISTWIDTH] IN BLOOD BY AUTOMATED COUNT: 43.5 FL (ref 35.9–50)
GLOBULIN SER CALC-MCNC: 3.5 G/DL (ref 1.9–3.5)
GLUCOSE SERPL-MCNC: 216 MG/DL (ref 65–99)
HCT VFR BLD AUTO: 40.8 % (ref 42–52)
HGB BLD-MCNC: 14.2 G/DL (ref 14–18)
IMM GRANULOCYTES # BLD AUTO: 0.02 K/UL (ref 0–0.11)
IMM GRANULOCYTES NFR BLD AUTO: 0.2 % (ref 0–0.9)
LYMPHOCYTES # BLD AUTO: 1.7 K/UL (ref 1–4.8)
LYMPHOCYTES NFR BLD: 18 % (ref 22–41)
MCH RBC QN AUTO: 31.6 PG (ref 27–33)
MCHC RBC AUTO-ENTMCNC: 34.8 G/DL (ref 33.7–35.3)
MCV RBC AUTO: 90.7 FL (ref 81.4–97.8)
MONOCYTES # BLD AUTO: 0.61 K/UL (ref 0–0.85)
MONOCYTES NFR BLD AUTO: 6.5 % (ref 0–13.4)
NEUTROPHILS # BLD AUTO: 6.68 K/UL (ref 1.82–7.42)
NEUTROPHILS NFR BLD: 70.7 % (ref 44–72)
NRBC # BLD AUTO: 0 K/UL
NRBC BLD-RTO: 0 /100 WBC
PLATELET # BLD AUTO: 186 K/UL (ref 164–446)
PMV BLD AUTO: 11.5 FL (ref 9–12.9)
POTASSIUM SERPL-SCNC: 4.5 MMOL/L (ref 3.6–5.5)
PROT SERPL-MCNC: 7.3 G/DL (ref 6–8.2)
RBC # BLD AUTO: 4.5 M/UL (ref 4.7–6.1)
SODIUM SERPL-SCNC: 135 MMOL/L (ref 135–145)
WBC # BLD AUTO: 9.5 K/UL (ref 4.8–10.8)

## 2018-10-29 PROCEDURE — 700111 HCHG RX REV CODE 636 W/ 250 OVERRIDE (IP): Performed by: NURSE PRACTITIONER

## 2018-10-29 PROCEDURE — 80053 COMPREHEN METABOLIC PANEL: CPT

## 2018-10-29 PROCEDURE — 96365 THER/PROPH/DIAG IV INF INIT: CPT

## 2018-10-29 PROCEDURE — 85025 COMPLETE CBC W/AUTO DIFF WBC: CPT

## 2018-10-29 PROCEDURE — 99214 OFFICE O/P EST MOD 30 MIN: CPT | Performed by: NURSE PRACTITIONER

## 2018-10-29 PROCEDURE — 700105 HCHG RX REV CODE 258: Performed by: NURSE PRACTITIONER

## 2018-10-29 PROCEDURE — 36592 COLLECT BLOOD FROM PICC: CPT

## 2018-10-29 RX ORDER — 0.9 % SODIUM CHLORIDE 0.9 %
10-20 VIAL (ML) INJECTION PRN
Status: CANCELLED | OUTPATIENT
Start: 2018-10-29

## 2018-10-29 RX ORDER — 0.9 % SODIUM CHLORIDE 0.9 %
5 VIAL (ML) INJECTION PRN
Status: CANCELLED | OUTPATIENT
Start: 2018-10-29

## 2018-10-29 RX ORDER — ERTAPENEM 1 G/1
INJECTION, POWDER, LYOPHILIZED, FOR SOLUTION INTRAMUSCULAR; INTRAVENOUS
COMMUNITY

## 2018-10-29 RX ORDER — AMOXICILLIN AND CLAVULANATE POTASSIUM 875; 125 MG/1; MG/1
1 TABLET, FILM COATED ORAL 2 TIMES DAILY
Qty: 60 TAB | Refills: 0 | Status: SHIPPED | OUTPATIENT
Start: 2018-10-29 | End: 2018-11-28

## 2018-10-29 RX ADMIN — SODIUM CHLORIDE 1000 MG: 900 INJECTION INTRAVENOUS at 16:58

## 2018-10-29 ASSESSMENT — PAIN SCALES - GENERAL: PAINLEVEL_OUTOF10: 0

## 2018-10-29 NOTE — PROGRESS NOTES
[facility] Disease [facility]    Subjective:     Chief Complaint   Patient presents with   • Hospital Follow-up     Left DM foot infection      Interval History: 62 y.o. white male with history of diabetes mellitus, and hypertension.  Hospitalized from 9/29-10/16/18, admitted with worsening wound to his left fourth toe.  States he dropped an object on the toe approximately 1 week PTA.  Bcx on 9/29 negative.  Underwent Left 4th toe amputation and I&D of multiloculated abscess on 9/29 with Dr. Lopez, OR cx +GBS, Strep Viridans, MSSE & bacteroides vulgatus. Unfortunately due to poor wound healing, had to undergo an additional I&D on 10/10 with Dr. Lopez where it was noted that a metatarsal was soft indicating osteomyelitis.  Metatarsal was removed up to the proximal third.  Due to lack of insurance, was being treated with IV Unasyn for duration of treatment; however, hospital was able to set patient up with an BARBARA at [facility].  Discharged on IV ertapenem 1 g through 11/10/18.     Hospital records reviewed    Today, 10/29/2018: Patient reports feeling well and has been tolerating the IV ertapenem without adverse effect.  Denies feeling generally ill, fevers/chills, general malaise, headache, n/v/d, abdominal pain, chest pain or shortness of breath.  Pt stating that the wound is healing slowly.  He has been changing the dressings himself on a daily basis, states he was initially confused leaving the hospital based off of the directions they gave him for dressing changes.  States there is anywhere from minimal to moderate drainage, no odor and redness he appears to be improving.  He denies there being any pain.  Has a follow-up with Dr. Lopez at the end of this week.    ROS  As documented above in my HPI.    Past Medical History:   Diagnosis Date   • Diabetes mellitus (HCC)    • HTN (hypertension)        Social History   Substance Use Topics   • Smoking status: Never Smoker   • Smokeless tobacco: Never Used   •  "Alcohol use Yes      Comment: occ       Allergies: Patient has no known allergies.    Pt's medication and problem list reviewed.     Objective:     PE:  /66 (BP Location: Left arm, Patient Position: Sitting, BP Cuff Size: Adult)   Pulse 85   Temp 36.6 °C (97.9 °F) (Temporal)   Ht 1.765 m (5' 9.5\")   Wt 76.2 kg (168 lb)   SpO2 97%   BMI 24.45 kg/m²      Vital signs reviewed    Constitutional: Appears well-developed and well-nourished. No acute distress.  Speech fluent.  Appears older than stated age.  Eyes: Conjunctivae normal and EOM are normal. Pupils are equal, round, and reactive to light.   Neck: Trachea midline. Normal range of motion. Neck supple. No JVD.  Cardiovascular: Normal rate, regular rhythm, normal heart sounds and intact distal pulses. No murmur, gallop, or friction rub.  Mild edema to left fifth toe.  Respiratory: No respiratory distress, unlabored respiratory effort.  Lungs clear to auscultation bilaterally. No wheezes or rales.   Abdomen: Soft, non tender, non-distended. BS + x 4. No masses.  Musculoskeletal: Steady gait.  Normal range of motion.    Left fourth toe amp site-sutures in place, mild serosanguineous drainage without odor, mild erythema to surrounding tissue, maceration between third and fifth toes, nontender to palpation.  RUE PICC- CDI, non tender, no erythema.   Skin: Warm and dry.  No visible rashes or lesions.  Neurological: No cranial nerve deficit. Coordination normal.  Decreased sensation to LLE.  Psychiatric: Alert and oriented to person, place, and time. Normal mood, calm affect.  Normal behavior and judgment.     Labs:  WBC   Date/Time Value Ref Range Status   10/22/2018 04:05 PM 8.4 4.8 - 10.8 K/uL Final     RBC   Date/Time Value Ref Range Status   10/22/2018 04:05 PM 4.72 4.70 - 6.10 M/uL Final     Hemoglobin   Date/Time Value Ref Range Status   10/22/2018 04:05 PM 14.6 14.0 - 18.0 g/dL Final     Hematocrit   Date/Time Value Ref Range Status   10/22/2018 04:05 " PM 43.1 42.0 - 52.0 % Final     MCV   Date/Time Value Ref Range Status   10/22/2018 04:05 PM 91.3 81.4 - 97.8 fL Final     MCH   Date/Time Value Ref Range Status   10/22/2018 04:05 PM 30.9 27.0 - 33.0 pg Final     MCHC   Date/Time Value Ref Range Status   10/22/2018 04:05 PM 33.9 33.7 - 35.3 g/dL Final     MPV   Date/Time Value Ref Range Status   10/22/2018 04:05 PM 11.3 9.0 - 12.9 fL Final        Sodium   Date/Time Value Ref Range Status   10/22/2018 04:05  135 - 145 mmol/L Final     Potassium   Date/Time Value Ref Range Status   10/22/2018 04:05 PM 4.4 3.6 - 5.5 mmol/L Final     Chloride   Date/Time Value Ref Range Status   10/22/2018 04:05  96 - 112 mmol/L Final     Co2   Date/Time Value Ref Range Status   10/22/2018 04:05 PM 25 20 - 33 mmol/L Final     Glucose   Date/Time Value Ref Range Status   10/22/2018 04:05  (H) 65 - 99 mg/dL Final     Bun   Date/Time Value Ref Range Status   10/22/2018 04:05 PM 45 (H) 8 - 22 mg/dL Final     Creatinine   Date/Time Value Ref Range Status   10/22/2018 04:05 PM 1.28 0.50 - 1.40 mg/dL Final       Alkaline Phosphatase   Date/Time Value Ref Range Status   10/22/2018 04:05 PM 91 30 - 99 U/L Final     AST(SGOT)   Date/Time Value Ref Range Status   10/22/2018 04:05 PM 30 12 - 45 U/L Final     ALT(SGPT)   Date/Time Value Ref Range Status   10/22/2018 04:05 PM 38 2 - 50 U/L Final     Total Bilirubin   Date/Time Value Ref Range Status   10/22/2018 04:05 PM 0.4 0.1 - 1.5 mg/dL Final      Assessment and Plan:   The following treatment plan was discussed with patient at length:    1. Diabetic foot infection, with 4th left toe osteomyelitis Streptococcus agalactiae (Group B) (HCC)  amoxicillin-clavulanate (AUGMENTIN) 875-125 MG Tab    -Continue IV ertapenem through 11/10/18.  DC PICC after last infusion dose.  -On 11/11, start p.o. Augmentin, plan to treat for 1-3 months.  Pt instructed to call clinic with any adverse effects or to discontinue the medication and go to  the ER should difficulty breathing, SOB, CP, facial swelling and/or gross rash/itching occurs.   -Monitor for s/sx of worsening transitioning from IV to PO abx: increased redness, pain, swelling, drainage, breakdown of surgical site, fevers, chills, general malaise, etc.  Notify ID or go to ER should these s/sx occur.  -Follow-up with Dr. Lopez as directed.  -Wound care as directed.   2. Streptococcus viridans infection      As above.   3. Methicillin susceptible Staphylococcus epidermidis infection      As above.   4. Type 2 diabetes mellitus with complication, without long-term current use of insulin (HCC)      HgA1C 8.3% on 9/30/18.  Continue to monitor blood sugars closely as DM will impair wound healing and can worsen infection.     Follow up: 3 weeks, RTC sooner if needed. FU with PCP for ongoing chronic medical conditions.     CARLOS Castro.     Please note that this dictation was created using voice recognition software. I have  worked with technical experts from [employer] to optimize the interface.  I have made every reasonable attempt to correct obvious errors, but there may be errors of grammar and possibly content that I did not discover before finalizing the note.

## 2018-10-29 NOTE — PROGRESS NOTES
Pt presents today for Invanz, offers no new complaints. PICC line flushed and positive blood return noted. Invanz infused over 30 minutes, tolerated well. PICC line dressing changed per protocol, flushed. Pt discharged home stable ambulatory will return to clinic tomorrow for Invanz.

## 2018-10-30 ENCOUNTER — OUTPATIENT INFUSION SERVICES (OUTPATIENT)
Dept: ONCOLOGY | Facility: MEDICAL CENTER | Age: 63
End: 2018-10-30
Attending: NURSE PRACTITIONER
Payer: MEDICAID

## 2018-10-30 VITALS
TEMPERATURE: 97.8 F | OXYGEN SATURATION: 100 % | HEART RATE: 86 BPM | SYSTOLIC BLOOD PRESSURE: 140 MMHG | DIASTOLIC BLOOD PRESSURE: 79 MMHG | RESPIRATION RATE: 18 BRPM

## 2018-10-30 DIAGNOSIS — L08.9 DIABETIC FOOT INFECTION (HCC): ICD-10-CM

## 2018-10-30 DIAGNOSIS — E11.628 DIABETIC FOOT INFECTION (HCC): ICD-10-CM

## 2018-10-30 PROCEDURE — 700111 HCHG RX REV CODE 636 W/ 250 OVERRIDE (IP): Performed by: NURSE PRACTITIONER

## 2018-10-30 PROCEDURE — 96365 THER/PROPH/DIAG IV INF INIT: CPT

## 2018-10-30 PROCEDURE — 700105 HCHG RX REV CODE 258: Performed by: NURSE PRACTITIONER

## 2018-10-30 RX ORDER — 0.9 % SODIUM CHLORIDE 0.9 %
10-20 VIAL (ML) INJECTION PRN
Status: CANCELLED | OUTPATIENT
Start: 2018-10-30

## 2018-10-30 RX ORDER — 0.9 % SODIUM CHLORIDE 0.9 %
5 VIAL (ML) INJECTION PRN
Status: CANCELLED | OUTPATIENT
Start: 2018-10-30

## 2018-10-30 RX ADMIN — SODIUM CHLORIDE 1000 MG: 900 INJECTION INTRAVENOUS at 17:09

## 2018-10-30 ASSESSMENT — PAIN SCALES - GENERAL: PAINLEVEL_OUTOF10: 0

## 2018-10-30 NOTE — PROGRESS NOTES
Pt presents today for daily Invanz, offers no new complaints. PICC line flushed and positive blood return noted, labs drawn per orders. Infusion well tolerated. PICC flushed and pt discharged home stable will return to clinic tomorrow.

## 2018-10-31 ENCOUNTER — OUTPATIENT INFUSION SERVICES (OUTPATIENT)
Dept: ONCOLOGY | Facility: MEDICAL CENTER | Age: 63
End: 2018-10-31
Attending: NURSE PRACTITIONER
Payer: MEDICAID

## 2018-10-31 VITALS
TEMPERATURE: 97.1 F | OXYGEN SATURATION: 99 % | SYSTOLIC BLOOD PRESSURE: 113 MMHG | RESPIRATION RATE: 18 BRPM | DIASTOLIC BLOOD PRESSURE: 69 MMHG | HEART RATE: 86 BPM

## 2018-10-31 DIAGNOSIS — E11.628 DIABETIC FOOT INFECTION (HCC): ICD-10-CM

## 2018-10-31 DIAGNOSIS — L08.9 DIABETIC FOOT INFECTION (HCC): ICD-10-CM

## 2018-10-31 PROCEDURE — 96365 THER/PROPH/DIAG IV INF INIT: CPT

## 2018-10-31 PROCEDURE — 700111 HCHG RX REV CODE 636 W/ 250 OVERRIDE (IP): Performed by: NURSE PRACTITIONER

## 2018-10-31 PROCEDURE — 700105 HCHG RX REV CODE 258: Performed by: NURSE PRACTITIONER

## 2018-10-31 RX ORDER — 0.9 % SODIUM CHLORIDE 0.9 %
5 VIAL (ML) INJECTION PRN
Status: CANCELLED | OUTPATIENT
Start: 2018-10-31

## 2018-10-31 RX ORDER — 0.9 % SODIUM CHLORIDE 0.9 %
10-20 VIAL (ML) INJECTION PRN
Status: CANCELLED | OUTPATIENT
Start: 2018-10-31

## 2018-10-31 RX ADMIN — SODIUM CHLORIDE 1000 MG: 900 INJECTION INTRAVENOUS at 17:08

## 2018-10-31 ASSESSMENT — PAIN SCALES - GENERAL
PAINLEVEL_OUTOF10: 0
PAINLEVEL: NO PAIN

## 2018-10-31 NOTE — PROGRESS NOTES
Pt presents today for daily Invanz infusion, offers no new complaints today. PICC line flushed and positive blood return noted. Invanz infused over 30 min, tolerated well. PICC line flushed and pt discharged home stable. Will return to clinic tomorrow for Invanz.

## 2018-11-01 ENCOUNTER — OUTPATIENT INFUSION SERVICES (OUTPATIENT)
Dept: ONCOLOGY | Facility: MEDICAL CENTER | Age: 63
End: 2018-11-01
Attending: NURSE PRACTITIONER
Payer: MEDICAID

## 2018-11-01 VITALS
DIASTOLIC BLOOD PRESSURE: 77 MMHG | RESPIRATION RATE: 18 BRPM | SYSTOLIC BLOOD PRESSURE: 118 MMHG | TEMPERATURE: 98.4 F | HEART RATE: 90 BPM | OXYGEN SATURATION: 100 %

## 2018-11-01 DIAGNOSIS — E11.628 DIABETIC FOOT INFECTION (HCC): ICD-10-CM

## 2018-11-01 DIAGNOSIS — L08.9 DIABETIC FOOT INFECTION (HCC): ICD-10-CM

## 2018-11-01 PROCEDURE — 700111 HCHG RX REV CODE 636 W/ 250 OVERRIDE (IP): Performed by: NURSE PRACTITIONER

## 2018-11-01 PROCEDURE — 96365 THER/PROPH/DIAG IV INF INIT: CPT

## 2018-11-01 PROCEDURE — 700105 HCHG RX REV CODE 258: Performed by: NURSE PRACTITIONER

## 2018-11-01 RX ORDER — 0.9 % SODIUM CHLORIDE 0.9 %
10-20 VIAL (ML) INJECTION PRN
Status: CANCELLED | OUTPATIENT
Start: 2018-11-01

## 2018-11-01 RX ORDER — 0.9 % SODIUM CHLORIDE 0.9 %
5 VIAL (ML) INJECTION PRN
Status: CANCELLED | OUTPATIENT
Start: 2018-11-01

## 2018-11-01 RX ADMIN — SODIUM CHLORIDE 1000 MG: 900 INJECTION INTRAVENOUS at 17:00

## 2018-11-01 ASSESSMENT — PAIN SCALES - GENERAL: PAINLEVEL_OUTOF10: 0

## 2018-11-01 NOTE — PROGRESS NOTES
Sumit returns for IVABX. Invanz infused as ordered. Sumit tolerated well and without incident. PICC line in good condition and has positive blood return. PICC line flushed with saline per protocol. Next appointment scheduled. Discharged to self care; no apparent distress noted.

## 2018-11-02 ENCOUNTER — OFFICE VISIT (OUTPATIENT)
Dept: WOUND CARE | Facility: MEDICAL CENTER | Age: 63
End: 2018-11-02
Attending: NURSE PRACTITIONER
Payer: MEDICAID

## 2018-11-02 ENCOUNTER — OUTPATIENT INFUSION SERVICES (OUTPATIENT)
Dept: ONCOLOGY | Facility: MEDICAL CENTER | Age: 63
End: 2018-11-02
Attending: NURSE PRACTITIONER
Payer: MEDICAID

## 2018-11-02 VITALS
SYSTOLIC BLOOD PRESSURE: 130 MMHG | RESPIRATION RATE: 18 BRPM | OXYGEN SATURATION: 99 % | TEMPERATURE: 98.2 F | DIASTOLIC BLOOD PRESSURE: 77 MMHG | HEART RATE: 80 BPM

## 2018-11-02 DIAGNOSIS — E11.628 DIABETIC FOOT INFECTION (HCC): ICD-10-CM

## 2018-11-02 DIAGNOSIS — L08.9 DIABETIC FOOT INFECTION (HCC): ICD-10-CM

## 2018-11-02 DIAGNOSIS — S98.132A AMPUTATED TOE OF LEFT FOOT (HCC): ICD-10-CM

## 2018-11-02 PROCEDURE — 99212 OFFICE O/P EST SF 10 MIN: CPT | Performed by: NURSE PRACTITIONER

## 2018-11-02 PROCEDURE — 700105 HCHG RX REV CODE 258: Performed by: NURSE PRACTITIONER

## 2018-11-02 PROCEDURE — 96365 THER/PROPH/DIAG IV INF INIT: CPT

## 2018-11-02 PROCEDURE — 700111 HCHG RX REV CODE 636 W/ 250 OVERRIDE (IP): Performed by: NURSE PRACTITIONER

## 2018-11-02 PROCEDURE — 99213 OFFICE O/P EST LOW 20 MIN: CPT

## 2018-11-02 RX ORDER — 0.9 % SODIUM CHLORIDE 0.9 %
10-20 VIAL (ML) INJECTION PRN
Status: CANCELLED | OUTPATIENT
Start: 2018-11-02

## 2018-11-02 RX ORDER — 0.9 % SODIUM CHLORIDE 0.9 %
5 VIAL (ML) INJECTION PRN
Status: CANCELLED | OUTPATIENT
Start: 2018-11-02

## 2018-11-02 RX ADMIN — SODIUM CHLORIDE 1000 MG: 900 INJECTION INTRAVENOUS at 17:11

## 2018-11-02 ASSESSMENT — PAIN SCALES - GENERAL: PAINLEVEL_OUTOF10: 0

## 2018-11-02 NOTE — PROGRESS NOTES
Mr Estrada returns for IVABX. Invanz infused as ordered. POC discussed and questions answered. Sumit tolerated well and without incident. PICC line in good condition and has positive blood return. PICC line flushed with saline per protocol. Sumit DC'd home in good condition. Returns daily.

## 2018-11-03 ENCOUNTER — OUTPATIENT INFUSION SERVICES (OUTPATIENT)
Dept: ONCOLOGY | Facility: MEDICAL CENTER | Age: 63
End: 2018-11-03
Attending: NURSE PRACTITIONER
Payer: MEDICAID

## 2018-11-03 VITALS
SYSTOLIC BLOOD PRESSURE: 137 MMHG | DIASTOLIC BLOOD PRESSURE: 78 MMHG | RESPIRATION RATE: 18 BRPM | OXYGEN SATURATION: 100 % | TEMPERATURE: 98.3 F | HEART RATE: 98 BPM

## 2018-11-03 DIAGNOSIS — L08.9 DIABETIC FOOT INFECTION (HCC): ICD-10-CM

## 2018-11-03 DIAGNOSIS — E11.628 DIABETIC FOOT INFECTION (HCC): ICD-10-CM

## 2018-11-03 PROCEDURE — 700105 HCHG RX REV CODE 258: Performed by: NURSE PRACTITIONER

## 2018-11-03 PROCEDURE — 96365 THER/PROPH/DIAG IV INF INIT: CPT

## 2018-11-03 PROCEDURE — 700111 HCHG RX REV CODE 636 W/ 250 OVERRIDE (IP): Performed by: NURSE PRACTITIONER

## 2018-11-03 RX ORDER — 0.9 % SODIUM CHLORIDE 0.9 %
10-20 VIAL (ML) INJECTION PRN
Status: CANCELLED | OUTPATIENT
Start: 2018-11-03

## 2018-11-03 RX ORDER — 0.9 % SODIUM CHLORIDE 0.9 %
5 VIAL (ML) INJECTION PRN
Status: CANCELLED | OUTPATIENT
Start: 2018-11-03

## 2018-11-03 RX ADMIN — SODIUM CHLORIDE 1000 MG: 900 INJECTION INTRAVENOUS at 18:01

## 2018-11-03 ASSESSMENT — PAIN SCALES - GENERAL: PAINLEVEL_OUTOF10: 0

## 2018-11-03 NOTE — PATIENT INSTRUCTIONS
-Keep your wound dressing clean, dry, and intact.    -Change your dressing if it becomes soiled, soaked, or falls off.    -Should you experience any significant changes in your wound(s), such as infection (redness, swelling, localized heat, increased pain, fever > 101 F, chills) or have any questions regarding your home care instructions, please contact the wound center at [phone]. If after hours, contact your primary care physician or go to the hospital emergency room.

## 2018-11-03 NOTE — PROGRESS NOTES
Pt seen during ortho rounds with LPS team for left foot amp site wound.  Following physician assessment, RN evaluated patient's wound and dressed with Aquacel Ag covered with non-adhesive foam and secured with hypafix tape.

## 2018-11-03 NOTE — PROGRESS NOTES
LIMB PRESERVATION SERVICE ROUNDS    Patient seen in collaboration with interdisciplinary team during LPS rounds in wound clinic for assessment of left fourth ray amputation site.  He is wearing an offloading shoe.    Patient states blood sugars are averaging-he has not checked his blood sugars for the past several days    OBJECTIVE FINDINGS: Incision well approximated, minimal serosanguineous drainage.  No edema or erythema near amputation site.    Labs/diagnostic reviewed: A1c 8.3 on 9/30/18    PROCEDURE   Sutures removed and wound care completed by  Martina Mosqueda  RN    PLAN:   Wound Care: Aquacel Ag to manage exudate and bioburden   Imaging: no further imaging at this time  Offloading: Patient to continue an offloading shoe until custom orthotics are ready  Antibiotics: IV Invanz until 11/10, per ID  Surgery: No further surgery at this time  Referral: Orthotist for custom inserts and shoes      LPS Follow up: No further LPS follow-up required

## 2018-11-04 ENCOUNTER — OUTPATIENT INFUSION SERVICES (OUTPATIENT)
Dept: ONCOLOGY | Facility: MEDICAL CENTER | Age: 63
End: 2018-11-04
Attending: NURSE PRACTITIONER
Payer: MEDICAID

## 2018-11-04 VITALS
TEMPERATURE: 98.6 F | BODY MASS INDEX: 25.48 KG/M2 | OXYGEN SATURATION: 95 % | SYSTOLIC BLOOD PRESSURE: 146 MMHG | HEART RATE: 81 BPM | RESPIRATION RATE: 18 BRPM | WEIGHT: 172.62 LBS | DIASTOLIC BLOOD PRESSURE: 87 MMHG

## 2018-11-04 DIAGNOSIS — E11.628 DIABETIC FOOT INFECTION (HCC): ICD-10-CM

## 2018-11-04 DIAGNOSIS — L08.9 DIABETIC FOOT INFECTION (HCC): ICD-10-CM

## 2018-11-04 PROCEDURE — 700105 HCHG RX REV CODE 258: Performed by: NURSE PRACTITIONER

## 2018-11-04 PROCEDURE — 96365 THER/PROPH/DIAG IV INF INIT: CPT

## 2018-11-04 PROCEDURE — 700111 HCHG RX REV CODE 636 W/ 250 OVERRIDE (IP): Performed by: NURSE PRACTITIONER

## 2018-11-04 RX ORDER — 0.9 % SODIUM CHLORIDE 0.9 %
5 VIAL (ML) INJECTION PRN
Status: CANCELLED | OUTPATIENT
Start: 2018-11-04

## 2018-11-04 RX ORDER — 0.9 % SODIUM CHLORIDE 0.9 %
10-20 VIAL (ML) INJECTION PRN
Status: CANCELLED | OUTPATIENT
Start: 2018-11-04

## 2018-11-04 RX ADMIN — SODIUM CHLORIDE 1000 MG: 900 INJECTION INTRAVENOUS at 17:15

## 2018-11-04 ASSESSMENT — PAIN SCALES - GENERAL: PAINLEVEL_OUTOF10: 0

## 2018-11-04 NOTE — PROGRESS NOTES
Pt arrived ambulatory to [facility] for scheduled IVABX infusion. States no new complaints upon arrival. Wearing normal shoes today and states very happy about this progression. PICC assessed upon arrival, blood return noted, flushed appropriately. Infusion completed with no s/s adverse effects noted. Dressing to be changed tomorrow. After infusion completed, PICC flushed and gauze placed around clave. Elastic sleeve in place. Patient left ambulatory in no apparent distress with confirmation to return tomorrow.

## 2018-11-05 ENCOUNTER — OUTPATIENT INFUSION SERVICES (OUTPATIENT)
Dept: ONCOLOGY | Facility: MEDICAL CENTER | Age: 63
End: 2018-11-05
Attending: NURSE PRACTITIONER
Payer: MEDICAID

## 2018-11-05 VITALS
HEART RATE: 90 BPM | DIASTOLIC BLOOD PRESSURE: 76 MMHG | HEIGHT: 69 IN | SYSTOLIC BLOOD PRESSURE: 115 MMHG | OXYGEN SATURATION: 98 % | RESPIRATION RATE: 18 BRPM | BODY MASS INDEX: 25.83 KG/M2 | WEIGHT: 174.38 LBS | TEMPERATURE: 97.1 F

## 2018-11-05 DIAGNOSIS — E11.628 DIABETIC FOOT INFECTION (HCC): ICD-10-CM

## 2018-11-05 DIAGNOSIS — L08.9 DIABETIC FOOT INFECTION (HCC): ICD-10-CM

## 2018-11-05 LAB
ALBUMIN SERPL BCP-MCNC: 4 G/DL (ref 3.2–4.9)
ALBUMIN/GLOB SERPL: 1.2 G/DL
ALP SERPL-CCNC: 89 U/L (ref 30–99)
ALT SERPL-CCNC: 59 U/L (ref 2–50)
ANION GAP SERPL CALC-SCNC: 9 MMOL/L (ref 0–11.9)
AST SERPL-CCNC: 34 U/L (ref 12–45)
BASOPHILS # BLD AUTO: 0.6 % (ref 0–1.8)
BASOPHILS # BLD: 0.06 K/UL (ref 0–0.12)
BILIRUB SERPL-MCNC: 0.5 MG/DL (ref 0.1–1.5)
BUN SERPL-MCNC: 34 MG/DL (ref 8–22)
CALCIUM SERPL-MCNC: 9.9 MG/DL (ref 8.5–10.5)
CHLORIDE SERPL-SCNC: 101 MMOL/L (ref 96–112)
CO2 SERPL-SCNC: 26 MMOL/L (ref 20–33)
CREAT SERPL-MCNC: 1.33 MG/DL (ref 0.5–1.4)
CRP SERPL HS-MCNC: 0.02 MG/DL (ref 0–0.75)
EOSINOPHIL # BLD AUTO: 0.31 K/UL (ref 0–0.51)
EOSINOPHIL NFR BLD: 3.1 % (ref 0–6.9)
ERYTHROCYTE [DISTWIDTH] IN BLOOD BY AUTOMATED COUNT: 41.5 FL (ref 35.9–50)
ERYTHROCYTE [SEDIMENTATION RATE] IN BLOOD BY WESTERGREN METHOD: 15 MM/HOUR (ref 0–20)
GLOBULIN SER CALC-MCNC: 3.3 G/DL (ref 1.9–3.5)
GLUCOSE SERPL-MCNC: 149 MG/DL (ref 65–99)
HCT VFR BLD AUTO: 42.5 % (ref 42–52)
HGB BLD-MCNC: 15.2 G/DL (ref 14–18)
IMM GRANULOCYTES # BLD AUTO: 0.05 K/UL (ref 0–0.11)
IMM GRANULOCYTES NFR BLD AUTO: 0.5 % (ref 0–0.9)
LYMPHOCYTES # BLD AUTO: 1.88 K/UL (ref 1–4.8)
LYMPHOCYTES NFR BLD: 18.9 % (ref 22–41)
MCH RBC QN AUTO: 31.5 PG (ref 27–33)
MCHC RBC AUTO-ENTMCNC: 35.8 G/DL (ref 33.7–35.3)
MCV RBC AUTO: 88 FL (ref 81.4–97.8)
MONOCYTES # BLD AUTO: 0.64 K/UL (ref 0–0.85)
MONOCYTES NFR BLD AUTO: 6.4 % (ref 0–13.4)
NEUTROPHILS # BLD AUTO: 7 K/UL (ref 1.82–7.42)
NEUTROPHILS NFR BLD: 70.5 % (ref 44–72)
NRBC # BLD AUTO: 0 K/UL
NRBC BLD-RTO: 0 /100 WBC
PLATELET # BLD AUTO: 215 K/UL (ref 164–446)
PMV BLD AUTO: 11.3 FL (ref 9–12.9)
POTASSIUM SERPL-SCNC: 4.2 MMOL/L (ref 3.6–5.5)
PROT SERPL-MCNC: 7.3 G/DL (ref 6–8.2)
RBC # BLD AUTO: 4.83 M/UL (ref 4.7–6.1)
SODIUM SERPL-SCNC: 136 MMOL/L (ref 135–145)
WBC # BLD AUTO: 9.9 K/UL (ref 4.8–10.8)

## 2018-11-05 PROCEDURE — 86140 C-REACTIVE PROTEIN: CPT

## 2018-11-05 PROCEDURE — 700105 HCHG RX REV CODE 258: Performed by: NURSE PRACTITIONER

## 2018-11-05 PROCEDURE — 85025 COMPLETE CBC W/AUTO DIFF WBC: CPT

## 2018-11-05 PROCEDURE — 36592 COLLECT BLOOD FROM PICC: CPT

## 2018-11-05 PROCEDURE — 96365 THER/PROPH/DIAG IV INF INIT: CPT

## 2018-11-05 PROCEDURE — 700111 HCHG RX REV CODE 636 W/ 250 OVERRIDE (IP): Performed by: NURSE PRACTITIONER

## 2018-11-05 PROCEDURE — 85652 RBC SED RATE AUTOMATED: CPT

## 2018-11-05 PROCEDURE — 80053 COMPREHEN METABOLIC PANEL: CPT

## 2018-11-05 RX ORDER — 0.9 % SODIUM CHLORIDE 0.9 %
5 VIAL (ML) INJECTION PRN
Status: CANCELLED | OUTPATIENT
Start: 2018-11-05

## 2018-11-05 RX ORDER — 0.9 % SODIUM CHLORIDE 0.9 %
10-20 VIAL (ML) INJECTION PRN
Status: CANCELLED | OUTPATIENT
Start: 2018-11-05

## 2018-11-05 RX ADMIN — SODIUM CHLORIDE 1000 MG: 900 INJECTION INTRAVENOUS at 17:12

## 2018-11-05 ASSESSMENT — PAIN SCALES - GENERAL: PAINLEVEL_OUTOF10: 0

## 2018-11-05 NOTE — PROGRESS NOTES
Patient arrived ambulatory to the [facility] for Ertapenem. Reviewed vital signs, labs, and physician order. Patient reports removal of stitches from LLE this week, dressing in place. Pt arrives with  PICC to RUE visualized brisk blood return. Ertapenem administered, no adverse reaction observed. IV flushed per protocol, dressing changed with sterile technique, 4X4 and gauze sleeve placed for protection. Confirmed upcoming appointment date and time with patient. Patient left the [facility] ambulatory in no sign of distress.

## 2018-11-06 ENCOUNTER — OUTPATIENT INFUSION SERVICES (OUTPATIENT)
Dept: ONCOLOGY | Facility: MEDICAL CENTER | Age: 63
End: 2018-11-06
Attending: NURSE PRACTITIONER
Payer: MEDICAID

## 2018-11-06 VITALS
HEART RATE: 101 BPM | RESPIRATION RATE: 18 BRPM | SYSTOLIC BLOOD PRESSURE: 133 MMHG | TEMPERATURE: 98 F | OXYGEN SATURATION: 98 % | DIASTOLIC BLOOD PRESSURE: 69 MMHG

## 2018-11-06 DIAGNOSIS — L08.9 DIABETIC FOOT INFECTION (HCC): ICD-10-CM

## 2018-11-06 DIAGNOSIS — E11.628 DIABETIC FOOT INFECTION (HCC): ICD-10-CM

## 2018-11-06 PROCEDURE — 700105 HCHG RX REV CODE 258: Performed by: NURSE PRACTITIONER

## 2018-11-06 PROCEDURE — 96365 THER/PROPH/DIAG IV INF INIT: CPT

## 2018-11-06 PROCEDURE — 700111 HCHG RX REV CODE 636 W/ 250 OVERRIDE (IP): Performed by: NURSE PRACTITIONER

## 2018-11-06 RX ORDER — 0.9 % SODIUM CHLORIDE 0.9 %
10-20 VIAL (ML) INJECTION PRN
Status: CANCELLED | OUTPATIENT
Start: 2018-11-06

## 2018-11-06 RX ORDER — 0.9 % SODIUM CHLORIDE 0.9 %
5 VIAL (ML) INJECTION PRN
Status: CANCELLED | OUTPATIENT
Start: 2018-11-06

## 2018-11-06 RX ADMIN — SODIUM CHLORIDE 1000 MG: 900 INJECTION INTRAVENOUS at 17:15

## 2018-11-06 ASSESSMENT — PAIN SCALES - GENERAL: PAINLEVEL_OUTOF10: 0

## 2018-11-06 NOTE — PROGRESS NOTES
Pt scheduled for ertapenem. Arrived ambulatory, independent; denied pain/discomfort or recent health changes. Pt stated L-foot OM wound improving. LUE PICC flushed, brisk blood return noted. Labs drawn per orders. Infusion completed w/o adverse events. PICC flushed, brisk blood return noted. Treatment plan reviewed; pt verbalized knowledge of next appt; denied any unmet needs. Pt discharged ambulatory, independent, NAD.

## 2018-11-07 ENCOUNTER — OUTPATIENT INFUSION SERVICES (OUTPATIENT)
Dept: ONCOLOGY | Facility: MEDICAL CENTER | Age: 63
End: 2018-11-07
Attending: NURSE PRACTITIONER
Payer: MEDICAID

## 2018-11-07 VITALS
TEMPERATURE: 97.8 F | SYSTOLIC BLOOD PRESSURE: 125 MMHG | OXYGEN SATURATION: 98 % | RESPIRATION RATE: 18 BRPM | DIASTOLIC BLOOD PRESSURE: 73 MMHG | HEART RATE: 99 BPM

## 2018-11-07 DIAGNOSIS — L08.9 DIABETIC FOOT INFECTION (HCC): ICD-10-CM

## 2018-11-07 DIAGNOSIS — E11.628 DIABETIC FOOT INFECTION (HCC): ICD-10-CM

## 2018-11-07 PROCEDURE — 700105 HCHG RX REV CODE 258: Performed by: NURSE PRACTITIONER

## 2018-11-07 PROCEDURE — 96365 THER/PROPH/DIAG IV INF INIT: CPT

## 2018-11-07 PROCEDURE — 700111 HCHG RX REV CODE 636 W/ 250 OVERRIDE (IP): Performed by: NURSE PRACTITIONER

## 2018-11-07 RX ORDER — 0.9 % SODIUM CHLORIDE 0.9 %
10-20 VIAL (ML) INJECTION PRN
Status: CANCELLED | OUTPATIENT
Start: 2018-11-07

## 2018-11-07 RX ORDER — 0.9 % SODIUM CHLORIDE 0.9 %
5 VIAL (ML) INJECTION PRN
Status: CANCELLED | OUTPATIENT
Start: 2018-11-07

## 2018-11-07 RX ADMIN — SODIUM CHLORIDE 1000 MG: 900 INJECTION INTRAVENOUS at 18:06

## 2018-11-07 ASSESSMENT — PAIN SCALES - GENERAL: PAINLEVEL_OUTOF10: 0

## 2018-11-08 ENCOUNTER — OUTPATIENT INFUSION SERVICES (OUTPATIENT)
Dept: ONCOLOGY | Facility: MEDICAL CENTER | Age: 63
End: 2018-11-08
Attending: NURSE PRACTITIONER
Payer: MEDICAID

## 2018-11-08 VITALS
DIASTOLIC BLOOD PRESSURE: 78 MMHG | RESPIRATION RATE: 18 BRPM | WEIGHT: 174.38 LBS | SYSTOLIC BLOOD PRESSURE: 144 MMHG | TEMPERATURE: 98 F | BODY MASS INDEX: 25.74 KG/M2 | HEART RATE: 94 BPM | OXYGEN SATURATION: 98 %

## 2018-11-08 DIAGNOSIS — E11.628 DIABETIC FOOT INFECTION (HCC): ICD-10-CM

## 2018-11-08 DIAGNOSIS — L08.9 DIABETIC FOOT INFECTION (HCC): ICD-10-CM

## 2018-11-08 PROCEDURE — 96365 THER/PROPH/DIAG IV INF INIT: CPT

## 2018-11-08 PROCEDURE — 700105 HCHG RX REV CODE 258: Performed by: NURSE PRACTITIONER

## 2018-11-08 PROCEDURE — 700111 HCHG RX REV CODE 636 W/ 250 OVERRIDE (IP): Performed by: NURSE PRACTITIONER

## 2018-11-08 RX ORDER — 0.9 % SODIUM CHLORIDE 0.9 %
10-20 VIAL (ML) INJECTION PRN
Status: CANCELLED | OUTPATIENT
Start: 2018-11-08

## 2018-11-08 RX ORDER — 0.9 % SODIUM CHLORIDE 0.9 %
5 VIAL (ML) INJECTION PRN
Status: CANCELLED | OUTPATIENT
Start: 2018-11-08

## 2018-11-08 RX ADMIN — SODIUM CHLORIDE 1000 MG: 900 INJECTION INTRAVENOUS at 18:04

## 2018-11-08 ASSESSMENT — PAIN SCALES - GENERAL: PAINLEVEL_OUTOF10: 0

## 2018-11-08 NOTE — PROGRESS NOTES
Pt arrived ambulatory to [facility] for scheduled IVABX infusion. States no new complaints upon arrival. PICC assessed upon arrival, blood return noted, flushed appropriately. Infusion completed with no s/s adverse effects noted. After infusion completed, PICC flushed and gauze placed around clave. Elastic sleeve in place. Patient left ambulatory in no apparent distress with confirmation to return tomorrow.

## 2018-11-09 ENCOUNTER — OUTPATIENT INFUSION SERVICES (OUTPATIENT)
Dept: ONCOLOGY | Facility: MEDICAL CENTER | Age: 63
End: 2018-11-09
Attending: NURSE PRACTITIONER
Payer: MEDICAID

## 2018-11-09 VITALS
OXYGEN SATURATION: 100 % | SYSTOLIC BLOOD PRESSURE: 154 MMHG | TEMPERATURE: 98.2 F | DIASTOLIC BLOOD PRESSURE: 76 MMHG | HEART RATE: 98 BPM | RESPIRATION RATE: 18 BRPM

## 2018-11-09 DIAGNOSIS — L08.9 DIABETIC FOOT INFECTION (HCC): ICD-10-CM

## 2018-11-09 DIAGNOSIS — E11.628 DIABETIC FOOT INFECTION (HCC): ICD-10-CM

## 2018-11-09 PROCEDURE — 700111 HCHG RX REV CODE 636 W/ 250 OVERRIDE (IP): Performed by: NURSE PRACTITIONER

## 2018-11-09 PROCEDURE — 96365 THER/PROPH/DIAG IV INF INIT: CPT

## 2018-11-09 PROCEDURE — 700105 HCHG RX REV CODE 258: Performed by: NURSE PRACTITIONER

## 2018-11-09 RX ORDER — 0.9 % SODIUM CHLORIDE 0.9 %
5 VIAL (ML) INJECTION PRN
Status: CANCELLED | OUTPATIENT
Start: 2018-11-09

## 2018-11-09 RX ORDER — 0.9 % SODIUM CHLORIDE 0.9 %
10-20 VIAL (ML) INJECTION PRN
Status: CANCELLED | OUTPATIENT
Start: 2018-11-09

## 2018-11-09 RX ADMIN — SODIUM CHLORIDE 1000 MG: 900 INJECTION INTRAVENOUS at 17:28

## 2018-11-09 ASSESSMENT — PAIN SCALES - GENERAL: PAINLEVEL_OUTOF10: 0

## 2018-11-09 NOTE — PROGRESS NOTES
Pt arrived ambulatory to [facility] for scheduled IVABX infusion. States no new complaints upon arrival. PICC assessed upon arrival, blood return noted, flushed appropriately. Infusion completed with no s/s adverse effects noted. After infusion completed, PICC flushed, blood return noted again, and gauze placed around clave. Elastic sleeve in place. Patient left ambulatory in no apparent distress with confirmation to return tomorrow.

## 2018-11-10 ENCOUNTER — OUTPATIENT INFUSION SERVICES (OUTPATIENT)
Dept: ONCOLOGY | Facility: MEDICAL CENTER | Age: 63
End: 2018-11-10
Attending: NURSE PRACTITIONER
Payer: MEDICAID

## 2018-11-10 VITALS
OXYGEN SATURATION: 99 % | DIASTOLIC BLOOD PRESSURE: 86 MMHG | HEART RATE: 83 BPM | SYSTOLIC BLOOD PRESSURE: 142 MMHG | RESPIRATION RATE: 18 BRPM | TEMPERATURE: 98.9 F

## 2018-11-10 DIAGNOSIS — L08.9 DIABETIC FOOT INFECTION (HCC): ICD-10-CM

## 2018-11-10 DIAGNOSIS — E11.628 DIABETIC FOOT INFECTION (HCC): ICD-10-CM

## 2018-11-10 PROCEDURE — 700105 HCHG RX REV CODE 258: Performed by: NURSE PRACTITIONER

## 2018-11-10 PROCEDURE — 96365 THER/PROPH/DIAG IV INF INIT: CPT

## 2018-11-10 PROCEDURE — 700111 HCHG RX REV CODE 636 W/ 250 OVERRIDE (IP): Performed by: NURSE PRACTITIONER

## 2018-11-10 RX ORDER — 0.9 % SODIUM CHLORIDE 0.9 %
10-20 VIAL (ML) INJECTION PRN
Status: CANCELLED | OUTPATIENT
Start: 2018-11-10

## 2018-11-10 RX ORDER — 0.9 % SODIUM CHLORIDE 0.9 %
5 VIAL (ML) INJECTION PRN
Status: CANCELLED | OUTPATIENT
Start: 2018-11-10

## 2018-11-10 RX ADMIN — SODIUM CHLORIDE 1000 MG: 900 INJECTION INTRAVENOUS at 17:11

## 2018-11-10 ASSESSMENT — PAIN SCALES - GENERAL: PAINLEVEL_OUTOF10: 0

## 2018-11-10 NOTE — PROGRESS NOTES
Pt presented to infusion center for daily Invanz. Pt reports no significant changes since yesterday. right arm PICC line in place, flushed and brisk blood return observed. Invanz infused with no s/s of adverse reaction. PICC line flushed, brisk blood return again observed, clave changed, wrapped in gauze and protective sleeve. Has next appt, left on foot in good spirits.

## 2018-11-11 NOTE — PROGRESS NOTES
Pt to [facility] for daily Invanz infusion for DM left foot.  Pt PICC line flushed per protocol w/ brisk blood return noted.  Invanz infused through PICC with no adverse reactions.  Pt PICC again flushed per protocol w/ brisk blood return noted, PICC line removed per MD order, tip intact, pressure dressing applied.  Pt monitored for 30 mins w/ no s/s of bleeding or adverse effects.  Infection prevention education and insertion site care education provided.  Pt left on foot in NAD.

## 2018-11-20 ENCOUNTER — TELEPHONE (OUTPATIENT)
Dept: INFECTIOUS DISEASES | Facility: MEDICAL CENTER | Age: 63
End: 2018-11-20

## 2018-11-20 ENCOUNTER — DOCUMENTATION (OUTPATIENT)
Dept: INFECTIOUS DISEASES | Facility: MEDICAL CENTER | Age: 63
End: 2018-11-20

## 2018-11-24 LAB
MYCOBACTERIUM SPEC CULT: NORMAL
RHODAMINE-AURAMINE STN SPEC: NORMAL
SIGNIFICANT IND 70042: NORMAL
SITE SITE: NORMAL
SOURCE SOURCE: NORMAL

## 2018-12-11 ENCOUNTER — HOSPITAL ENCOUNTER (EMERGENCY)
Facility: MEDICAL CENTER | Age: 63
End: 2019-01-12

## 2019-01-08 NOTE — ADDENDUM NOTE
Encounter addended by: Zenobia Yoo R.N. on: 1/7/2019  5:28 PM<BR>    Actions taken: Utilization Review saved, Flowsheet accepted

## 2024-03-06 NOTE — PROGRESS NOTES
Pt scheduled for ertapenem abx. Arrived ambulatory, independent; denied pain/discomfort or recent health changes. Pt stated L-foot wound improving, recently had stitches taken out and able to wear normal footwear. RUE PICC flushed, brisk blood return noted; clave changed per protocol. Infusion completed w/o adverse events. PICC flushed, brisk blood return noted. Treatment plan reviewed; pt verbalized knowledge of next appt; denied any unmet needs. Pt discharged ambulatory, independent, NAD.   Yes

## (undated) DEVICE — BLADE SURGICAL #15 - (50/BX 3BX/CA)

## (undated) DEVICE — PACK MAJOR ORTHO - (2EA/CA)

## (undated) DEVICE — SUTURE 2-0 PDS II CT-2 - (36/BX)

## (undated) DEVICE — GOWN WARMING STANDARD FLEX - (30/CA)

## (undated) DEVICE — SENSOR SPO2 NEO LNCS ADHESIVE (20/BX) SEE USER NOTES

## (undated) DEVICE — MASK ANESTHESIA ADULT  - (100/CA)

## (undated) DEVICE — LACTATED RINGERS INJ 1000 ML - (14EA/CA 60CA/PF)

## (undated) DEVICE — DRESSING XEROFORM 1X8 - (50/BX 4BX/CA)

## (undated) DEVICE — SET EXTENSION WITH 2 PORTS (48EA/CA) ***PART #2C8610 IS A SUBSTITUTE*****

## (undated) DEVICE — SODIUM CHL IRRIGATION 0.9% 1000ML (12EA/CA)

## (undated) DEVICE — ELECTRODE DUAL RETURN W/ CORD - (50/PK)

## (undated) DEVICE — DRAPE LARGE 3 QUARTER - (20/CA)

## (undated) DEVICE — TRAY SRGPRP PVP IOD WT PRP - (20/CA)

## (undated) DEVICE — HEAD HOLDER JUNIOR/ADULT

## (undated) DEVICE — KIT ANESTHESIA W/CIRCUIT & 3/LT BAG W/FILTER (20EA/CA)

## (undated) DEVICE — SPONGE GAUZE STER 4X4 8-PL - (2/PK 50PK/BX 12BX/CS)

## (undated) DEVICE — BANDAGE ELASTIC 6 HONEYCOMB - 6X5YD LF (20/CA)"

## (undated) DEVICE — PROTECTOR ULNA NERVE - (36PR/CA)

## (undated) DEVICE — SUTURE ETHILON 2-0 FSLX 30 (36PK/BX)"

## (undated) DEVICE — GLOVE BIOGEL PI INDICATOR SZ 6.5 SURGICAL PF LF - (50/BX 4BX/CA)

## (undated) DEVICE — ELECTRODE 850 FOAM ADHESIVE - HYDROGEL RADIOTRNSPRNT (50/PK)

## (undated) DEVICE — SLEEVE, VASO, THIGH, MED

## (undated) DEVICE — GLOVE SZ 6 BIOGEL PI MICRO - PF LF (50PR/BX 4BX/CA)

## (undated) DEVICE — SUTURE 2-0 VICRYL PLUS CT-1 - 8 X 18 INCH(12/BX)

## (undated) DEVICE — PADDING CAST 4 IN STERILE - 4 X 4 YDS (24/CA)

## (undated) DEVICE — SUTURE 1 VICRYL PLUS CTX - 36 INCH (36/BX)

## (undated) DEVICE — PADDING CAST 6 IN STERILE - 6 X 4 YDS (24/CA)

## (undated) DEVICE — CANISTER SUCTION 3000ML MECHANICAL FILTER AUTO SHUTOFF MEDI-VAC NONSTERILE LF DISP  (40EA/CA)

## (undated) DEVICE — TUBING CLEARLINK DUO-VENT - C-FLO (48EA/CA)

## (undated) DEVICE — TIP INTPLS HFLO ML ORFC BTRY - (12/CS)  FOR SURGILAV

## (undated) DEVICE — BANDAGE ELASTIC 4 HONEYCOMB - 4"X5YD LF (20/CA)"

## (undated) DEVICE — PAD LAP STERILE 18 X 18 - (5/PK 40PK/CA)

## (undated) DEVICE — BANDAGE ELASTIC 4 IN X 5 YDS - LATEX FREE(10/BX 5BX/CA)

## (undated) DEVICE — SUTURE 0 VICRYL PLUS CT-1 - 8 X 18 INCH (12/BX)

## (undated) DEVICE — SET LEADWIRE 5 LEAD BEDSIDE DISPOSABLE ECG (1SET OF 5/EA)

## (undated) DEVICE — SUTURE GENERAL

## (undated) DEVICE — GLOVE BIOGEL SZ 7.5 SURGICAL PF LTX - (50PR/BX 4BX/CA)

## (undated) DEVICE — SUCTION INSTRUMENT YANKAUER BULBOUS TIP W/O VENT (50EA/CA)

## (undated) DEVICE — CHLORAPREP 26 ML APPLICATOR - ORANGE TINT(25/CA)

## (undated) DEVICE — SUTURE 2-0 MONOCRYL CT-1

## (undated) DEVICE — PADDING CAST 4 IN X 4 YDS - SOF-ROLL (12RL/BG 6BG/CT)

## (undated) DEVICE — SPONGE GAUZESTER 4 X 4 4PLY - (128PK/CA)

## (undated) DEVICE — KIT ROOM DECONTAMINATION

## (undated) DEVICE — DRAPE LOWER EXTREMETY - (6/CA)

## (undated) DEVICE — DRESSING PETROLEUM GAUZE 5 X 9" (50EA/BX 4BX/CA)"

## (undated) DEVICE — NEPTUNE 4 PORT MANIFOLD - (20/PK)

## (undated) DEVICE — WATER IRRIG. STER. 1500 ML - (9/CA)

## (undated) DEVICE — GLOVE BIOGEL INDICATOR SZ 8 SURGICAL PF LTX - (50/BX 4BX/CA)